# Patient Record
Sex: MALE | Race: BLACK OR AFRICAN AMERICAN | Employment: PART TIME
[De-identification: names, ages, dates, MRNs, and addresses within clinical notes are randomized per-mention and may not be internally consistent; named-entity substitution may affect disease eponyms.]

---

## 2017-01-10 ENCOUNTER — OFFICE VISIT (OUTPATIENT)
Dept: FAMILY MEDICINE CLINIC | Facility: CLINIC | Age: 47
End: 2017-01-10

## 2017-01-10 DIAGNOSIS — I10 ESSENTIAL HYPERTENSION: Primary | ICD-10-CM

## 2017-01-10 DIAGNOSIS — E78.2 MIXED HYPERLIPIDEMIA: ICD-10-CM

## 2017-01-10 DIAGNOSIS — N18.3 CKD (CHRONIC KIDNEY DISEASE), STAGE 3 (MODERATE): ICD-10-CM

## 2017-01-10 DIAGNOSIS — E66.01 MORBID OBESITY WITH BMI OF 50.0-59.9, ADULT (HCC): ICD-10-CM

## 2017-01-10 DIAGNOSIS — Z13.9 SCREENING: ICD-10-CM

## 2017-01-10 PROCEDURE — 99213 OFFICE O/P EST LOW 20 MIN: CPT | Performed by: FAMILY MEDICINE

## 2017-01-10 PROCEDURE — G0009 ADMIN PNEUMOCOCCAL VACCINE: HCPCS | Performed by: FAMILY MEDICINE

## 2017-01-10 PROCEDURE — 90732 PPSV23 VACC 2 YRS+ SUBQ/IM: CPT | Performed by: FAMILY MEDICINE

## 2017-01-10 RX ORDER — HYDRALAZINE HYDROCHLORIDE 10 MG/1
TABLET, FILM COATED ORAL
Qty: 60 TABLET | Refills: 1 | Status: SHIPPED | OUTPATIENT
Start: 2017-01-10 | End: 2017-03-28 | Stop reason: SDUPTHER

## 2017-01-10 RX ORDER — LABETALOL 300 MG/1
TABLET, FILM COATED ORAL
Qty: 60 TABLET | Refills: 3 | Status: SHIPPED | OUTPATIENT
Start: 2017-01-10 | End: 2017-03-31 | Stop reason: SDUPTHER

## 2017-01-10 RX ORDER — LISINOPRIL 2.5 MG/1
2.5 TABLET ORAL DAILY
Qty: 30 TABLET | Refills: 1 | Status: SHIPPED | OUTPATIENT
Start: 2017-01-10 | End: 2017-02-14 | Stop reason: SDUPTHER

## 2017-01-10 RX ORDER — ROSUVASTATIN CALCIUM 10 MG/1
10 TABLET, COATED ORAL DAILY
Qty: 30 TABLET | Refills: 3 | Status: SHIPPED | OUTPATIENT
Start: 2017-01-10 | End: 2017-06-07 | Stop reason: SDUPTHER

## 2017-01-10 RX ORDER — FUROSEMIDE 20 MG/1
TABLET ORAL
Qty: 60 TABLET | Refills: 1 | Status: SHIPPED | OUTPATIENT
Start: 2017-01-10 | End: 2017-03-16 | Stop reason: SDUPTHER

## 2017-01-10 RX ORDER — COLCHICINE 0.6 MG/1
0.6 TABLET ORAL DAILY
COMMUNITY
End: 2017-04-07 | Stop reason: SDUPTHER

## 2017-01-10 RX ORDER — ROSUVASTATIN CALCIUM 10 MG/1
10 TABLET, COATED ORAL DAILY
COMMUNITY
End: 2017-01-10 | Stop reason: SDUPTHER

## 2017-01-10 RX ORDER — CLONIDINE HYDROCHLORIDE 0.2 MG/1
TABLET ORAL
Qty: 60 TABLET | Refills: 1 | Status: SHIPPED | OUTPATIENT
Start: 2017-01-10 | End: 2017-03-16 | Stop reason: ALTCHOICE

## 2017-01-10 ASSESSMENT — ENCOUNTER SYMPTOMS
EYES NEGATIVE: 1
RESPIRATORY NEGATIVE: 1
CONSTIPATION: 0
DIARRHEA: 0
ABDOMINAL PAIN: 0
VOMITING: 0
COLOR CHANGE: 0
NAUSEA: 0
GASTROINTESTINAL NEGATIVE: 1

## 2017-01-31 RX ORDER — FUROSEMIDE 20 MG/1
TABLET ORAL
Qty: 60 TABLET | Refills: 1 | Status: SHIPPED | OUTPATIENT
Start: 2017-01-31 | End: 2017-03-16 | Stop reason: ALTCHOICE

## 2017-02-14 ENCOUNTER — OFFICE VISIT (OUTPATIENT)
Dept: FAMILY MEDICINE CLINIC | Facility: CLINIC | Age: 47
End: 2017-02-14

## 2017-02-14 VITALS
TEMPERATURE: 98.4 F | BODY MASS INDEX: 45.1 KG/M2 | SYSTOLIC BLOOD PRESSURE: 138 MMHG | HEART RATE: 72 BPM | DIASTOLIC BLOOD PRESSURE: 88 MMHG | HEIGHT: 70 IN | WEIGHT: 315 LBS

## 2017-02-14 DIAGNOSIS — E66.01 MORBID OBESITY WITH BMI OF 50.0-59.9, ADULT (HCC): ICD-10-CM

## 2017-02-14 DIAGNOSIS — I10 ESSENTIAL HYPERTENSION: Primary | ICD-10-CM

## 2017-02-14 DIAGNOSIS — Z99.89 OSA ON CPAP: ICD-10-CM

## 2017-02-14 DIAGNOSIS — N18.3 CKD (CHRONIC KIDNEY DISEASE), STAGE 3 (MODERATE): ICD-10-CM

## 2017-02-14 DIAGNOSIS — M1A.9XX0 CHRONIC GOUT, UNSPECIFIED CAUSE, UNSPECIFIED SITE: ICD-10-CM

## 2017-02-14 DIAGNOSIS — G47.33 OSA ON CPAP: ICD-10-CM

## 2017-02-14 PROCEDURE — 99213 OFFICE O/P EST LOW 20 MIN: CPT | Performed by: FAMILY MEDICINE

## 2017-02-14 RX ORDER — LISINOPRIL 5 MG/1
5 TABLET ORAL DAILY
Qty: 30 TABLET | Refills: 1 | Status: SHIPPED | OUTPATIENT
Start: 2017-02-14 | End: 2017-03-16 | Stop reason: SDUPTHER

## 2017-02-14 ASSESSMENT — ENCOUNTER SYMPTOMS
NAUSEA: 0
DIARRHEA: 0
GASTROINTESTINAL NEGATIVE: 1
RESPIRATORY NEGATIVE: 1
EYES NEGATIVE: 1
VOMITING: 0
ABDOMINAL PAIN: 0
COLOR CHANGE: 0
CONSTIPATION: 0

## 2017-02-22 RX ORDER — ALLOPURINOL 100 MG/1
TABLET ORAL
Qty: 30 TABLET | Refills: 5 | Status: SHIPPED | OUTPATIENT
Start: 2017-02-22 | End: 2017-04-18 | Stop reason: SDUPTHER

## 2017-02-28 DIAGNOSIS — N18.3 CKD (CHRONIC KIDNEY DISEASE), STAGE 3 (MODERATE): ICD-10-CM

## 2017-02-28 DIAGNOSIS — E78.5 HYPERLIPIDEMIA, UNSPECIFIED HYPERLIPIDEMIA TYPE: ICD-10-CM

## 2017-02-28 DIAGNOSIS — I10 ESSENTIAL HYPERTENSION: ICD-10-CM

## 2017-02-28 RX ORDER — CLONIDINE HYDROCHLORIDE 0.2 MG/1
TABLET ORAL
Qty: 60 TABLET | Refills: 1 | OUTPATIENT
Start: 2017-02-28

## 2017-02-28 RX ORDER — SIMVASTATIN 20 MG
TABLET ORAL
Qty: 30 TABLET | Refills: 3 | Status: SHIPPED | OUTPATIENT
Start: 2017-02-28 | End: 2017-06-10

## 2017-02-28 RX ORDER — LISINOPRIL 5 MG/1
5 TABLET ORAL DAILY
Qty: 30 TABLET | Refills: 3 | Status: SHIPPED | OUTPATIENT
Start: 2017-02-28 | End: 2017-03-16 | Stop reason: ALTCHOICE

## 2017-03-01 RX ORDER — COLCHICINE 0.6 MG/1
TABLET ORAL
Qty: 30 TABLET | Refills: 3 | OUTPATIENT
Start: 2017-03-01

## 2017-03-16 ENCOUNTER — OFFICE VISIT (OUTPATIENT)
Dept: FAMILY MEDICINE CLINIC | Age: 47
End: 2017-03-16
Payer: MEDICARE

## 2017-03-16 ENCOUNTER — HOSPITAL ENCOUNTER (OUTPATIENT)
Age: 47
Discharge: HOME | End: 2017-03-16
Payer: MEDICARE

## 2017-03-16 VITALS
DIASTOLIC BLOOD PRESSURE: 89 MMHG | HEIGHT: 70 IN | SYSTOLIC BLOOD PRESSURE: 156 MMHG | WEIGHT: 315 LBS | TEMPERATURE: 98.8 F | HEART RATE: 85 BPM | BODY MASS INDEX: 45.1 KG/M2

## 2017-03-16 DIAGNOSIS — I10 ESSENTIAL HYPERTENSION: Primary | ICD-10-CM

## 2017-03-16 DIAGNOSIS — N18.3 CKD (CHRONIC KIDNEY DISEASE), STAGE 3 (MODERATE): ICD-10-CM

## 2017-03-16 DIAGNOSIS — E66.01 MORBID OBESITY WITH BMI OF 50.0-59.9, ADULT (HCC): ICD-10-CM

## 2017-03-16 PROCEDURE — G8427 DOCREV CUR MEDS BY ELIG CLIN: HCPCS | Performed by: FAMILY MEDICINE

## 2017-03-16 PROCEDURE — 99213 OFFICE O/P EST LOW 20 MIN: CPT | Performed by: FAMILY MEDICINE

## 2017-03-16 PROCEDURE — G8417 CALC BMI ABV UP PARAM F/U: HCPCS | Performed by: FAMILY MEDICINE

## 2017-03-16 PROCEDURE — 1036F TOBACCO NON-USER: CPT | Performed by: FAMILY MEDICINE

## 2017-03-16 PROCEDURE — G8484 FLU IMMUNIZE NO ADMIN: HCPCS | Performed by: FAMILY MEDICINE

## 2017-03-16 RX ORDER — LISINOPRIL 10 MG/1
10 TABLET ORAL DAILY
Qty: 30 TABLET | Refills: 0 | Status: SHIPPED | OUTPATIENT
Start: 2017-03-16 | End: 2017-03-31 | Stop reason: SDUPTHER

## 2017-03-16 RX ORDER — HYDROCHLOROTHIAZIDE 25 MG/1
25 TABLET ORAL DAILY
Qty: 30 TABLET | Refills: 1 | Status: SHIPPED | OUTPATIENT
Start: 2017-03-16 | End: 2017-04-18 | Stop reason: SINTOL

## 2017-03-16 ASSESSMENT — ENCOUNTER SYMPTOMS
NAUSEA: 0
DIARRHEA: 0
RESPIRATORY NEGATIVE: 1
CONSTIPATION: 0
EYES NEGATIVE: 1
VOMITING: 0
ABDOMINAL PAIN: 0
COLOR CHANGE: 0
GASTROINTESTINAL NEGATIVE: 1

## 2017-03-28 DIAGNOSIS — I10 ESSENTIAL HYPERTENSION: ICD-10-CM

## 2017-03-29 RX ORDER — HYDRALAZINE HYDROCHLORIDE 10 MG/1
TABLET, FILM COATED ORAL
Qty: 60 TABLET | Refills: 1 | Status: SHIPPED | OUTPATIENT
Start: 2017-03-29 | End: 2017-03-31 | Stop reason: SDUPTHER

## 2017-03-31 DIAGNOSIS — N18.3 CKD (CHRONIC KIDNEY DISEASE), STAGE 3 (MODERATE): ICD-10-CM

## 2017-03-31 DIAGNOSIS — I10 ESSENTIAL HYPERTENSION: ICD-10-CM

## 2017-03-31 RX ORDER — LISINOPRIL 10 MG/1
TABLET ORAL
Qty: 30 TABLET | Refills: 0 | Status: SHIPPED | OUTPATIENT
Start: 2017-03-31 | End: 2017-05-19 | Stop reason: SDUPTHER

## 2017-03-31 RX ORDER — LABETALOL 300 MG/1
TABLET, FILM COATED ORAL
Qty: 60 TABLET | Refills: 3 | Status: SHIPPED | OUTPATIENT
Start: 2017-03-31 | End: 2017-05-19 | Stop reason: SDUPTHER

## 2017-03-31 RX ORDER — HYDRALAZINE HYDROCHLORIDE 10 MG/1
TABLET, FILM COATED ORAL
Qty: 60 TABLET | Refills: 1 | Status: SHIPPED | OUTPATIENT
Start: 2017-03-31 | End: 2017-04-18 | Stop reason: SDUPTHER

## 2017-04-07 RX ORDER — COLCHICINE 0.6 MG/1
TABLET ORAL
Qty: 30 TABLET | Refills: 3 | Status: SHIPPED | OUTPATIENT
Start: 2017-04-07 | End: 2017-12-02 | Stop reason: SDUPTHER

## 2017-04-11 ENCOUNTER — HOSPITAL ENCOUNTER (OUTPATIENT)
Age: 47
Setting detail: SPECIMEN
Discharge: HOME | End: 2017-04-11
Payer: MEDICARE

## 2017-04-11 DIAGNOSIS — I10 ESSENTIAL HYPERTENSION: ICD-10-CM

## 2017-04-11 DIAGNOSIS — M1A.9XX0 CHRONIC GOUT, UNSPECIFIED CAUSE, UNSPECIFIED SITE: ICD-10-CM

## 2017-04-11 DIAGNOSIS — Z13.9 SCREENING: ICD-10-CM

## 2017-04-11 LAB
ANION GAP SERPL CALCULATED.3IONS-SCNC: 13 MMOL/L (ref 9–17)
BUN BLDV-MCNC: 26 MG/DL (ref 6–20)
BUN/CREAT BLD: ABNORMAL (ref 9–20)
CALCIUM SERPL-MCNC: 9.3 MG/DL (ref 8.6–10.4)
CHLORIDE BLD-SCNC: 98 MMOL/L (ref 98–107)
CO2: 27 MMOL/L (ref 20–31)
CREAT SERPL-MCNC: 1.52 MG/DL (ref 0.7–1.2)
GFR AFRICAN AMERICAN: 60 ML/MIN
GFR NON-AFRICAN AMERICAN: 49 ML/MIN
GFR SERPL CREATININE-BSD FRML MDRD: ABNORMAL ML/MIN/{1.73_M2}
GFR SERPL CREATININE-BSD FRML MDRD: ABNORMAL ML/MIN/{1.73_M2}
GLUCOSE BLD-MCNC: 225 MG/DL (ref 70–99)
HIV AG/AB: NONREACTIVE
POTASSIUM SERPL-SCNC: 4.5 MMOL/L (ref 3.7–5.3)
SODIUM BLD-SCNC: 138 MMOL/L (ref 135–144)
URIC ACID: 7.7 MG/DL (ref 3.4–7)

## 2017-04-18 ENCOUNTER — OFFICE VISIT (OUTPATIENT)
Dept: FAMILY MEDICINE CLINIC | Age: 47
End: 2017-04-18
Payer: MEDICARE

## 2017-04-18 VITALS
HEART RATE: 75 BPM | TEMPERATURE: 98.1 F | DIASTOLIC BLOOD PRESSURE: 85 MMHG | BODY MASS INDEX: 54.83 KG/M2 | SYSTOLIC BLOOD PRESSURE: 132 MMHG | WEIGHT: 315 LBS

## 2017-04-18 DIAGNOSIS — E66.01 MORBID OBESITY WITH BMI OF 50.0-59.9, ADULT (HCC): ICD-10-CM

## 2017-04-18 DIAGNOSIS — M1A.0720 CHRONIC GOUT OF LEFT FOOT, UNSPECIFIED CAUSE: ICD-10-CM

## 2017-04-18 DIAGNOSIS — I10 ESSENTIAL HYPERTENSION: Primary | ICD-10-CM

## 2017-04-18 PROCEDURE — 99213 OFFICE O/P EST LOW 20 MIN: CPT

## 2017-04-18 PROCEDURE — 99213 OFFICE O/P EST LOW 20 MIN: CPT | Performed by: FAMILY MEDICINE

## 2017-04-18 RX ORDER — HYDRALAZINE HYDROCHLORIDE 10 MG/1
TABLET, FILM COATED ORAL
Qty: 90 TABLET | Refills: 1 | Status: SHIPPED | OUTPATIENT
Start: 2017-04-18 | End: 2017-05-19 | Stop reason: ALTCHOICE

## 2017-04-18 RX ORDER — ALLOPURINOL 300 MG/1
TABLET ORAL
Qty: 30 TABLET | Refills: 3 | Status: SHIPPED | OUTPATIENT
Start: 2017-04-18 | End: 2017-08-11 | Stop reason: SDUPTHER

## 2017-04-18 ASSESSMENT — ENCOUNTER SYMPTOMS
COLOR CHANGE: 0
VOMITING: 0
EYES NEGATIVE: 1
NAUSEA: 0
GASTROINTESTINAL NEGATIVE: 1
CONSTIPATION: 0
ABDOMINAL PAIN: 0
RESPIRATORY NEGATIVE: 1
DIARRHEA: 0

## 2017-04-18 ASSESSMENT — PATIENT HEALTH QUESTIONNAIRE - PHQ9
SUM OF ALL RESPONSES TO PHQ QUESTIONS 1-9: 0
SUM OF ALL RESPONSES TO PHQ9 QUESTIONS 1 & 2: 0
2. FEELING DOWN, DEPRESSED OR HOPELESS: 0
1. LITTLE INTEREST OR PLEASURE IN DOING THINGS: 0

## 2017-05-19 ENCOUNTER — OFFICE VISIT (OUTPATIENT)
Dept: FAMILY MEDICINE CLINIC | Age: 47
End: 2017-05-19
Payer: MEDICARE

## 2017-05-19 VITALS
DIASTOLIC BLOOD PRESSURE: 97 MMHG | WEIGHT: 315 LBS | SYSTOLIC BLOOD PRESSURE: 148 MMHG | HEART RATE: 90 BPM | BODY MASS INDEX: 45.1 KG/M2 | TEMPERATURE: 97.8 F | HEIGHT: 70 IN

## 2017-05-19 DIAGNOSIS — G89.29 CHRONIC BILATERAL LOW BACK PAIN WITHOUT SCIATICA: ICD-10-CM

## 2017-05-19 DIAGNOSIS — M54.50 CHRONIC BILATERAL LOW BACK PAIN WITHOUT SCIATICA: ICD-10-CM

## 2017-05-19 DIAGNOSIS — I10 ESSENTIAL HYPERTENSION: Primary | ICD-10-CM

## 2017-05-19 DIAGNOSIS — R06.09 DYSPNEA ON EXERTION: ICD-10-CM

## 2017-05-19 DIAGNOSIS — N18.3 CKD (CHRONIC KIDNEY DISEASE), STAGE 3 (MODERATE): ICD-10-CM

## 2017-05-19 PROCEDURE — 1036F TOBACCO NON-USER: CPT | Performed by: FAMILY MEDICINE

## 2017-05-19 PROCEDURE — 99213 OFFICE O/P EST LOW 20 MIN: CPT

## 2017-05-19 PROCEDURE — G8417 CALC BMI ABV UP PARAM F/U: HCPCS | Performed by: FAMILY MEDICINE

## 2017-05-19 PROCEDURE — 99213 OFFICE O/P EST LOW 20 MIN: CPT | Performed by: FAMILY MEDICINE

## 2017-05-19 PROCEDURE — G8427 DOCREV CUR MEDS BY ELIG CLIN: HCPCS | Performed by: FAMILY MEDICINE

## 2017-05-19 RX ORDER — LISINOPRIL 20 MG/1
TABLET ORAL
Qty: 30 TABLET | Refills: 1 | Status: SHIPPED | OUTPATIENT
Start: 2017-05-19 | End: 2017-06-02 | Stop reason: SDUPTHER

## 2017-05-19 RX ORDER — FUROSEMIDE 20 MG/1
20 TABLET ORAL DAILY
Qty: 30 TABLET | Refills: 1 | Status: SHIPPED | OUTPATIENT
Start: 2017-05-19 | End: 2017-06-02 | Stop reason: SDUPTHER

## 2017-05-19 RX ORDER — ACETAMINOPHEN 500 MG
1000 TABLET ORAL 2 TIMES DAILY PRN
Qty: 120 TABLET | Refills: 3 | Status: SHIPPED | OUTPATIENT
Start: 2017-05-19 | End: 2019-02-01 | Stop reason: SDUPTHER

## 2017-05-19 RX ORDER — LABETALOL 300 MG/1
TABLET, FILM COATED ORAL
Qty: 60 TABLET | Refills: 3 | Status: SHIPPED | OUTPATIENT
Start: 2017-05-19 | End: 2017-09-30 | Stop reason: SDUPTHER

## 2017-05-19 RX ORDER — AMLODIPINE BESYLATE 10 MG/1
TABLET ORAL
Qty: 30 TABLET | Refills: 4 | Status: SHIPPED | OUTPATIENT
Start: 2017-05-19 | End: 2017-10-20 | Stop reason: SDUPTHER

## 2017-05-19 ASSESSMENT — ENCOUNTER SYMPTOMS
NAUSEA: 0
COLOR CHANGE: 0
SHORTNESS OF BREATH: 1
DIARRHEA: 0
GASTROINTESTINAL NEGATIVE: 1
EYES NEGATIVE: 1
ABDOMINAL PAIN: 0
CONSTIPATION: 0
VOMITING: 0

## 2017-05-24 ENCOUNTER — HOSPITAL ENCOUNTER (OUTPATIENT)
Dept: NON INVASIVE DIAGNOSTICS | Age: 47
Discharge: HOME | End: 2017-05-24
Payer: MEDICARE

## 2017-05-24 DIAGNOSIS — R06.09 DYSPNEA ON EXERTION: ICD-10-CM

## 2017-05-24 LAB
LV EF: 55 %
LVEF MODALITY: NORMAL

## 2017-05-24 PROCEDURE — 93306 TTE W/DOPPLER COMPLETE: CPT

## 2017-05-25 ENCOUNTER — HOSPITAL ENCOUNTER (OUTPATIENT)
Age: 47
Discharge: HOME | End: 2017-05-25
Payer: MEDICARE

## 2017-05-25 DIAGNOSIS — I10 ESSENTIAL HYPERTENSION: ICD-10-CM

## 2017-05-25 LAB
ALBUMIN SERPL-MCNC: 4.1 G/DL (ref 3.5–5.2)
ALBUMIN/GLOBULIN RATIO: 1.1 (ref 1–2.5)
ALP BLD-CCNC: 78 U/L (ref 40–129)
ALT SERPL-CCNC: 28 U/L (ref 5–41)
ANION GAP SERPL CALCULATED.3IONS-SCNC: 14 MMOL/L (ref 9–17)
AST SERPL-CCNC: 28 U/L
BILIRUB SERPL-MCNC: 0.54 MG/DL (ref 0.3–1.2)
BUN BLDV-MCNC: 19 MG/DL (ref 6–20)
BUN/CREAT BLD: ABNORMAL (ref 9–20)
CALCIUM SERPL-MCNC: 9.2 MG/DL (ref 8.6–10.4)
CHLORIDE BLD-SCNC: 97 MMOL/L (ref 98–107)
CO2: 23 MMOL/L (ref 20–31)
CREAT SERPL-MCNC: 1.25 MG/DL (ref 0.7–1.2)
GFR AFRICAN AMERICAN: >60 ML/MIN
GFR NON-AFRICAN AMERICAN: >60 ML/MIN
GFR SERPL CREATININE-BSD FRML MDRD: ABNORMAL ML/MIN/{1.73_M2}
GFR SERPL CREATININE-BSD FRML MDRD: ABNORMAL ML/MIN/{1.73_M2}
GLUCOSE BLD-MCNC: 210 MG/DL (ref 70–99)
POTASSIUM SERPL-SCNC: 4.6 MMOL/L (ref 3.7–5.3)
SODIUM BLD-SCNC: 134 MMOL/L (ref 135–144)
TOTAL PROTEIN: 8 G/DL (ref 6.4–8.3)
TSH SERPL DL<=0.05 MIU/L-ACNC: 1.57 MIU/L (ref 0.3–5)

## 2017-05-25 PROCEDURE — 36415 COLL VENOUS BLD VENIPUNCTURE: CPT

## 2017-05-25 PROCEDURE — 82088 ASSAY OF ALDOSTERONE: CPT

## 2017-05-25 PROCEDURE — 84443 ASSAY THYROID STIM HORMONE: CPT

## 2017-05-25 PROCEDURE — 80053 COMPREHEN METABOLIC PANEL: CPT

## 2017-05-26 LAB
ALDOSTERONE COMMENT: NORMAL
ALDOSTERONE: 14.4 NG/DL

## 2017-06-02 ENCOUNTER — OFFICE VISIT (OUTPATIENT)
Dept: FAMILY MEDICINE CLINIC | Age: 47
End: 2017-06-02
Payer: MEDICARE

## 2017-06-02 VITALS
WEIGHT: 315 LBS | TEMPERATURE: 98.5 F | SYSTOLIC BLOOD PRESSURE: 138 MMHG | HEART RATE: 68 BPM | BODY MASS INDEX: 54.43 KG/M2 | DIASTOLIC BLOOD PRESSURE: 88 MMHG

## 2017-06-02 DIAGNOSIS — N18.3 CKD (CHRONIC KIDNEY DISEASE), STAGE 3 (MODERATE): ICD-10-CM

## 2017-06-02 DIAGNOSIS — I10 ESSENTIAL HYPERTENSION: Primary | ICD-10-CM

## 2017-06-02 DIAGNOSIS — Z99.89 OSA ON CPAP: ICD-10-CM

## 2017-06-02 DIAGNOSIS — G47.33 OSA ON CPAP: ICD-10-CM

## 2017-06-02 DIAGNOSIS — E11.9 TYPE 2 DIABETES MELLITUS WITHOUT COMPLICATION, WITHOUT LONG-TERM CURRENT USE OF INSULIN (HCC): ICD-10-CM

## 2017-06-02 LAB — HBA1C MFR BLD: 8.2 %

## 2017-06-02 PROCEDURE — 99213 OFFICE O/P EST LOW 20 MIN: CPT | Performed by: FAMILY MEDICINE

## 2017-06-02 PROCEDURE — G8417 CALC BMI ABV UP PARAM F/U: HCPCS | Performed by: FAMILY MEDICINE

## 2017-06-02 PROCEDURE — 83036 HEMOGLOBIN GLYCOSYLATED A1C: CPT | Performed by: FAMILY MEDICINE

## 2017-06-02 PROCEDURE — 1036F TOBACCO NON-USER: CPT | Performed by: FAMILY MEDICINE

## 2017-06-02 PROCEDURE — G8427 DOCREV CUR MEDS BY ELIG CLIN: HCPCS | Performed by: FAMILY MEDICINE

## 2017-06-02 PROCEDURE — 3044F HG A1C LEVEL LT 7.0%: CPT | Performed by: FAMILY MEDICINE

## 2017-06-02 RX ORDER — LISINOPRIL 20 MG/1
TABLET ORAL
Qty: 30 TABLET | Refills: 1 | Status: SHIPPED | OUTPATIENT
Start: 2017-06-02 | End: 2017-07-31 | Stop reason: SDUPTHER

## 2017-06-02 RX ORDER — FUROSEMIDE 20 MG/1
20 TABLET ORAL DAILY
Qty: 30 TABLET | Refills: 1 | Status: SHIPPED | OUTPATIENT
Start: 2017-06-02 | End: 2017-09-30 | Stop reason: SDUPTHER

## 2017-06-02 ASSESSMENT — ENCOUNTER SYMPTOMS
EYES NEGATIVE: 1
GASTROINTESTINAL NEGATIVE: 1
SHORTNESS OF BREATH: 0
NAUSEA: 0
VOMITING: 0
ABDOMINAL PAIN: 0
DIARRHEA: 0
COLOR CHANGE: 0
CONSTIPATION: 0

## 2017-06-07 DIAGNOSIS — E78.2 MIXED HYPERLIPIDEMIA: ICD-10-CM

## 2017-06-10 RX ORDER — ROSUVASTATIN CALCIUM 10 MG/1
TABLET, COATED ORAL
Qty: 30 TABLET | Refills: 3 | Status: SHIPPED | OUTPATIENT
Start: 2017-06-10 | End: 2017-10-14 | Stop reason: SDUPTHER

## 2017-06-29 ENCOUNTER — OFFICE VISIT (OUTPATIENT)
Dept: FAMILY MEDICINE CLINIC | Age: 47
End: 2017-06-29
Payer: MEDICARE

## 2017-06-29 VITALS
SYSTOLIC BLOOD PRESSURE: 139 MMHG | HEART RATE: 76 BPM | BODY MASS INDEX: 45.1 KG/M2 | TEMPERATURE: 97.3 F | WEIGHT: 315 LBS | DIASTOLIC BLOOD PRESSURE: 85 MMHG | HEIGHT: 70 IN

## 2017-06-29 DIAGNOSIS — E66.01 MORBID OBESITY WITH BMI OF 50.0-59.9, ADULT (HCC): ICD-10-CM

## 2017-06-29 DIAGNOSIS — E11.9 TYPE 2 DIABETES MELLITUS WITHOUT COMPLICATION, WITHOUT LONG-TERM CURRENT USE OF INSULIN (HCC): Primary | ICD-10-CM

## 2017-06-29 PROCEDURE — G8417 CALC BMI ABV UP PARAM F/U: HCPCS | Performed by: FAMILY MEDICINE

## 2017-06-29 PROCEDURE — G8427 DOCREV CUR MEDS BY ELIG CLIN: HCPCS | Performed by: FAMILY MEDICINE

## 2017-06-29 PROCEDURE — 99213 OFFICE O/P EST LOW 20 MIN: CPT | Performed by: FAMILY MEDICINE

## 2017-06-29 PROCEDURE — 3046F HEMOGLOBIN A1C LEVEL >9.0%: CPT | Performed by: FAMILY MEDICINE

## 2017-06-29 PROCEDURE — 1036F TOBACCO NON-USER: CPT | Performed by: FAMILY MEDICINE

## 2017-06-29 RX ORDER — GLIMEPIRIDE 4 MG/1
4 TABLET ORAL
Qty: 30 TABLET | Refills: 1 | Status: SHIPPED | OUTPATIENT
Start: 2017-06-29 | End: 2017-08-27 | Stop reason: SDUPTHER

## 2017-06-29 ASSESSMENT — ENCOUNTER SYMPTOMS
COLOR CHANGE: 0
DIARRHEA: 0
SHORTNESS OF BREATH: 0
CONSTIPATION: 0
EYES NEGATIVE: 1
NAUSEA: 0
ABDOMINAL PAIN: 0
GASTROINTESTINAL NEGATIVE: 1
VOMITING: 0

## 2017-07-27 ENCOUNTER — OFFICE VISIT (OUTPATIENT)
Dept: FAMILY MEDICINE CLINIC | Age: 47
End: 2017-07-27
Payer: MEDICARE

## 2017-07-27 VITALS
WEIGHT: 315 LBS | HEIGHT: 70 IN | TEMPERATURE: 97.4 F | HEART RATE: 74 BPM | BODY MASS INDEX: 45.1 KG/M2 | DIASTOLIC BLOOD PRESSURE: 92 MMHG | SYSTOLIC BLOOD PRESSURE: 140 MMHG

## 2017-07-27 DIAGNOSIS — E66.01 MORBID OBESITY WITH BMI OF 50.0-59.9, ADULT (HCC): ICD-10-CM

## 2017-07-27 DIAGNOSIS — I10 ESSENTIAL HYPERTENSION: ICD-10-CM

## 2017-07-27 DIAGNOSIS — N18.3 CKD (CHRONIC KIDNEY DISEASE), STAGE 3 (MODERATE): ICD-10-CM

## 2017-07-27 DIAGNOSIS — E11.9 TYPE 2 DIABETES MELLITUS WITHOUT COMPLICATION, WITHOUT LONG-TERM CURRENT USE OF INSULIN (HCC): Primary | ICD-10-CM

## 2017-07-27 PROCEDURE — G8417 CALC BMI ABV UP PARAM F/U: HCPCS | Performed by: FAMILY MEDICINE

## 2017-07-27 PROCEDURE — G8427 DOCREV CUR MEDS BY ELIG CLIN: HCPCS | Performed by: FAMILY MEDICINE

## 2017-07-27 PROCEDURE — 3046F HEMOGLOBIN A1C LEVEL >9.0%: CPT | Performed by: FAMILY MEDICINE

## 2017-07-27 PROCEDURE — 99213 OFFICE O/P EST LOW 20 MIN: CPT | Performed by: FAMILY MEDICINE

## 2017-07-27 PROCEDURE — 1036F TOBACCO NON-USER: CPT | Performed by: FAMILY MEDICINE

## 2017-07-27 RX ORDER — HYDRALAZINE HYDROCHLORIDE 10 MG/1
1 TABLET, FILM COATED ORAL 3 TIMES DAILY
Refills: 0 | COMMUNITY
Start: 2017-06-29 | End: 2018-05-29 | Stop reason: SDUPTHER

## 2017-07-27 ASSESSMENT — ENCOUNTER SYMPTOMS
VOMITING: 0
NAUSEA: 0
DIARRHEA: 0
SHORTNESS OF BREATH: 0

## 2017-07-31 DIAGNOSIS — N18.3 CKD (CHRONIC KIDNEY DISEASE), STAGE 3 (MODERATE): ICD-10-CM

## 2017-07-31 DIAGNOSIS — I10 ESSENTIAL HYPERTENSION: ICD-10-CM

## 2017-08-01 RX ORDER — LISINOPRIL 20 MG/1
TABLET ORAL
Qty: 30 TABLET | Refills: 1 | Status: SHIPPED | OUTPATIENT
Start: 2017-08-01 | End: 2017-08-07 | Stop reason: SDUPTHER

## 2017-08-01 RX ORDER — FUROSEMIDE 20 MG/1
TABLET ORAL
Qty: 30 TABLET | Refills: 1 | Status: SHIPPED | OUTPATIENT
Start: 2017-08-01 | End: 2018-01-23 | Stop reason: SDUPTHER

## 2017-08-01 RX ORDER — HYDRALAZINE HYDROCHLORIDE 10 MG/1
TABLET, FILM COATED ORAL
Qty: 90 TABLET | Refills: 1 | Status: SHIPPED | OUTPATIENT
Start: 2017-08-01 | End: 2017-09-26 | Stop reason: SDUPTHER

## 2017-08-07 DIAGNOSIS — N18.3 CKD (CHRONIC KIDNEY DISEASE), STAGE 3 (MODERATE): ICD-10-CM

## 2017-08-07 DIAGNOSIS — I10 ESSENTIAL HYPERTENSION: ICD-10-CM

## 2017-08-07 RX ORDER — LISINOPRIL 20 MG/1
20 TABLET ORAL DAILY
Qty: 90 TABLET | Refills: 0 | Status: SHIPPED | OUTPATIENT
Start: 2017-08-07 | End: 2018-01-05 | Stop reason: SDUPTHER

## 2017-08-11 DIAGNOSIS — M1A.0720 CHRONIC GOUT OF LEFT FOOT, UNSPECIFIED CAUSE: ICD-10-CM

## 2017-08-14 RX ORDER — ALLOPURINOL 300 MG/1
TABLET ORAL
Qty: 30 TABLET | Refills: 3 | Status: SHIPPED | OUTPATIENT
Start: 2017-08-14 | End: 2017-12-15 | Stop reason: SDUPTHER

## 2017-08-27 DIAGNOSIS — E11.9 TYPE 2 DIABETES MELLITUS WITHOUT COMPLICATION, WITHOUT LONG-TERM CURRENT USE OF INSULIN (HCC): ICD-10-CM

## 2017-08-29 RX ORDER — GLIMEPIRIDE 4 MG/1
TABLET ORAL
Qty: 30 TABLET | Refills: 1 | Status: SHIPPED | OUTPATIENT
Start: 2017-08-29 | End: 2017-10-25 | Stop reason: SDUPTHER

## 2017-09-01 ENCOUNTER — TELEPHONE (OUTPATIENT)
Dept: ADMINISTRATIVE | Age: 47
End: 2017-09-01

## 2017-09-26 DIAGNOSIS — I10 ESSENTIAL HYPERTENSION: ICD-10-CM

## 2017-09-27 RX ORDER — HYDRALAZINE HYDROCHLORIDE 10 MG/1
TABLET, FILM COATED ORAL
Qty: 90 TABLET | Refills: 1 | Status: SHIPPED | OUTPATIENT
Start: 2017-09-27 | End: 2017-11-21 | Stop reason: SDUPTHER

## 2017-09-30 DIAGNOSIS — I10 ESSENTIAL HYPERTENSION: ICD-10-CM

## 2017-10-04 RX ORDER — FUROSEMIDE 20 MG/1
TABLET ORAL
Qty: 30 TABLET | Refills: 1 | Status: SHIPPED | OUTPATIENT
Start: 2017-10-04 | End: 2017-12-22 | Stop reason: SDUPTHER

## 2017-10-04 RX ORDER — LABETALOL 300 MG/1
TABLET, FILM COATED ORAL
Qty: 60 TABLET | Refills: 3 | Status: SHIPPED | OUTPATIENT
Start: 2017-10-04 | End: 2018-01-23 | Stop reason: SDUPTHER

## 2017-10-14 DIAGNOSIS — E78.2 MIXED HYPERLIPIDEMIA: ICD-10-CM

## 2017-10-16 RX ORDER — ROSUVASTATIN CALCIUM 10 MG/1
TABLET, COATED ORAL
Qty: 30 TABLET | Refills: 3 | Status: SHIPPED | OUTPATIENT
Start: 2017-10-16 | End: 2018-01-23 | Stop reason: SDUPTHER

## 2017-10-20 DIAGNOSIS — I10 ESSENTIAL HYPERTENSION: ICD-10-CM

## 2017-10-20 RX ORDER — AMLODIPINE BESYLATE 10 MG/1
TABLET ORAL
Qty: 30 TABLET | Refills: 4 | Status: SHIPPED | OUTPATIENT
Start: 2017-10-20 | End: 2018-03-18 | Stop reason: SDUPTHER

## 2017-10-25 DIAGNOSIS — E11.9 TYPE 2 DIABETES MELLITUS WITHOUT COMPLICATION, WITHOUT LONG-TERM CURRENT USE OF INSULIN (HCC): ICD-10-CM

## 2017-10-26 RX ORDER — GLIMEPIRIDE 4 MG/1
TABLET ORAL
Qty: 30 TABLET | Refills: 1 | Status: SHIPPED | OUTPATIENT
Start: 2017-10-26 | End: 2017-12-22 | Stop reason: SDUPTHER

## 2017-11-21 DIAGNOSIS — I10 ESSENTIAL HYPERTENSION: ICD-10-CM

## 2017-11-22 RX ORDER — HYDRALAZINE HYDROCHLORIDE 10 MG/1
TABLET, FILM COATED ORAL
Qty: 90 TABLET | Refills: 1 | Status: SHIPPED | OUTPATIENT
Start: 2017-11-22 | End: 2018-01-23 | Stop reason: SDUPTHER

## 2017-12-04 RX ORDER — COLCHICINE 0.6 MG/1
TABLET, FILM COATED ORAL
Qty: 30 TABLET | Refills: 3 | Status: SHIPPED | OUTPATIENT
Start: 2017-12-04 | End: 2018-01-23 | Stop reason: SDUPTHER

## 2017-12-15 DIAGNOSIS — M1A.0720 CHRONIC GOUT OF LEFT FOOT, UNSPECIFIED CAUSE: ICD-10-CM

## 2017-12-15 RX ORDER — ALLOPURINOL 300 MG/1
TABLET ORAL
Qty: 30 TABLET | Refills: 3 | Status: SHIPPED | OUTPATIENT
Start: 2017-12-15 | End: 2018-04-18 | Stop reason: SDUPTHER

## 2017-12-22 DIAGNOSIS — E11.9 TYPE 2 DIABETES MELLITUS WITHOUT COMPLICATION, WITHOUT LONG-TERM CURRENT USE OF INSULIN (HCC): ICD-10-CM

## 2017-12-22 DIAGNOSIS — I10 ESSENTIAL HYPERTENSION: ICD-10-CM

## 2017-12-22 RX ORDER — FUROSEMIDE 20 MG/1
TABLET ORAL
Qty: 30 TABLET | Refills: 1 | Status: SHIPPED | OUTPATIENT
Start: 2017-12-22 | End: 2018-12-27 | Stop reason: SDUPTHER

## 2017-12-22 RX ORDER — GLIMEPIRIDE 4 MG/1
TABLET ORAL
Qty: 30 TABLET | Refills: 1 | Status: SHIPPED | OUTPATIENT
Start: 2017-12-22 | End: 2018-01-23 | Stop reason: SDUPTHER

## 2017-12-22 NOTE — TELEPHONE ENCOUNTER
Please address the medication refill and close the encounter. If I can be of assistance, please route to the applicable pool. Thank you. Next Visit Date:  No future appointments. Health Maintenance   Topic Date Due    Diabetic microalbuminuria test  11/22/2017    Lipid screen  11/22/2017    DTaP/Tdap/Td vaccine (1 - Tdap) 01/10/2018 (Originally 11/19/1988)    Flu vaccine (1) 01/10/2018 (Originally 9/1/2017)    Diabetic foot exam  02/01/2018 (Originally 11/19/1979)    Diabetic retinal exam  02/01/2018 (Originally 11/19/1979)    Diabetic hemoglobin A1C test  06/02/2018    Pneumococcal med risk  Completed    HIV screen  Completed       Hemoglobin A1C (%)   Date Value   06/02/2017 8.2   11/22/2016 5.2   05/19/2015 5.3             ( goal A1C is < 7)   Microalb/Crt.  Ratio (mcg/mg creat)   Date Value   11/22/2016 88 (H)     LDL Cholesterol (mg/dL)   Date Value   11/22/2016 60       (goal LDL is <100)   AST (U/L)   Date Value   05/25/2017 28     ALT (U/L)   Date Value   05/25/2017 28     BUN (mg/dL)   Date Value   06/06/2017 18     BP Readings from Last 3 Encounters:   07/27/17 (!) 140/92   06/29/17 139/85   06/02/17 138/88          (goal 120/80)    All Future Testing planned in [facility]  Lab Frequency Next Occurrence   Lipid Panel Once 01/31/2018               Patient Active Problem List:     HTN (hypertension)     Chronic back pain     Morbid obesity with BMI of 50.0-59.9, adult (HCC)     ZURDO on CPAP     Gout     CKD (chronic kidney disease)     HLD (hyperlipidemia)     Squamous cell carcinoma of face     Type 2 diabetes mellitus without complication, without long-term current use of insulin ([facility] [address]

## 2018-01-05 DIAGNOSIS — N18.30 STAGE 3 CHRONIC KIDNEY DISEASE (HCC): ICD-10-CM

## 2018-01-05 DIAGNOSIS — I10 ESSENTIAL HYPERTENSION: ICD-10-CM

## 2018-01-05 NOTE — TELEPHONE ENCOUNTER
Please address the medication refill and close the encounter. If I can be of assistance, please route to the applicable pool. Thank you. Next Visit Date:  Future Appointments  Date Time Provider Lizette Marques   1/23/2018 8:30 AM Pilar José MD [address] [place] [address] Maintenance   Topic Date Due    Diabetic microalbuminuria test  11/22/2017    Lipid screen  11/22/2017    DTaP/Tdap/Td vaccine (1 - Tdap) 01/10/2018 (Originally 11/19/1988)    Flu vaccine (1) 01/10/2018 (Originally 9/1/2017)    Diabetic foot exam  02/01/2018 (Originally 11/19/1979)    Diabetic retinal exam  02/01/2018 (Originally 11/19/1979)    A1C test (Diabetic or Prediabetic)  06/02/2018    Potassium monitoring  06/06/2018    Creatinine monitoring  06/06/2018    Pneumococcal med risk  Completed    HIV screen  Completed       Hemoglobin A1C (%)   Date Value   06/02/2017 8.2   11/22/2016 5.2   05/19/2015 5.3             ( goal A1C is < 7)   Microalb/Crt.  Ratio (mcg/mg creat)   Date Value   11/22/2016 88 (H)     LDL Cholesterol (mg/dL)   Date Value   11/22/2016 60       (goal LDL is <100)   AST (U/L)   Date Value   05/25/2017 28     ALT (U/L)   Date Value   05/25/2017 28     BUN (mg/dL)   Date Value   06/06/2017 18     BP Readings from Last 3 Encounters:   07/27/17 (!) 140/92   06/29/17 139/85   06/02/17 138/88          (goal 120/80)    All Future Testing planned in [facility]  Lab Frequency Next Occurrence   Lipid Panel Once 01/31/2018               Patient Active Problem List:     HTN (hypertension)     Chronic back pain     Morbid obesity with BMI of 50.0-59.9, adult (HCC)     ZURDO on CPAP     Gout     CKD (chronic kidney disease)     HLD (hyperlipidemia)     Squamous cell carcinoma of face     Type 2 diabetes mellitus without complication, without long-term current use of insulin ([facility] [address]

## 2018-01-09 RX ORDER — LISINOPRIL 20 MG/1
TABLET ORAL
Qty: 90 TABLET | Refills: 0 | Status: SHIPPED | OUTPATIENT
Start: 2018-01-09 | End: 2018-01-23 | Stop reason: SDUPTHER

## 2018-01-23 ENCOUNTER — HOSPITAL ENCOUNTER (OUTPATIENT)
Age: 48
Setting detail: SPECIMEN
Discharge: HOME | End: 2018-01-23
Payer: MEDICARE

## 2018-01-23 ENCOUNTER — OFFICE VISIT (OUTPATIENT)
Dept: FAMILY MEDICINE CLINIC | Age: 48
End: 2018-01-23
Payer: MEDICARE

## 2018-01-23 VITALS
WEIGHT: 315 LBS | SYSTOLIC BLOOD PRESSURE: 136 MMHG | TEMPERATURE: 97.5 F | BODY MASS INDEX: 45.1 KG/M2 | HEIGHT: 70 IN | HEART RATE: 77 BPM | DIASTOLIC BLOOD PRESSURE: 80 MMHG

## 2018-01-23 DIAGNOSIS — M1A.0720 CHRONIC GOUT OF LEFT FOOT, UNSPECIFIED CAUSE: ICD-10-CM

## 2018-01-23 DIAGNOSIS — I10 ESSENTIAL HYPERTENSION: ICD-10-CM

## 2018-01-23 DIAGNOSIS — E78.2 MIXED HYPERLIPIDEMIA: ICD-10-CM

## 2018-01-23 DIAGNOSIS — E11.9 TYPE 2 DIABETES MELLITUS WITHOUT COMPLICATION, WITHOUT LONG-TERM CURRENT USE OF INSULIN (HCC): Primary | ICD-10-CM

## 2018-01-23 DIAGNOSIS — N18.30 STAGE 3 CHRONIC KIDNEY DISEASE (HCC): ICD-10-CM

## 2018-01-23 LAB
ABSOLUTE EOS #: 0.17 K/UL (ref 0–0.44)
ABSOLUTE IMMATURE GRANULOCYTE: 0.03 K/UL (ref 0–0.3)
ABSOLUTE LYMPH #: 2.42 K/UL (ref 1.1–3.7)
ABSOLUTE MONO #: 0.58 K/UL (ref 0.1–1.2)
BASOPHILS # BLD: 0 % (ref 0–2)
BASOPHILS ABSOLUTE: 0.04 K/UL (ref 0–0.2)
CREATININE URINE: 121.8 MG/DL (ref 39–259)
DIFFERENTIAL TYPE: ABNORMAL
EOSINOPHILS RELATIVE PERCENT: 2 % (ref 1–4)
HBA1C MFR BLD: 5.1 %
HCT VFR BLD CALC: 38.5 % (ref 40.7–50.3)
HEMOGLOBIN: 11.7 G/DL (ref 13–17)
IMMATURE GRANULOCYTES: 0 %
LYMPHOCYTES # BLD: 26 % (ref 24–43)
MCH RBC QN AUTO: 26.4 PG (ref 25.2–33.5)
MCHC RBC AUTO-ENTMCNC: 30.4 G/DL (ref 28.4–34.8)
MCV RBC AUTO: 86.7 FL (ref 82.6–102.9)
MICROALBUMIN/CREAT 24H UR: 251 MG/L
MICROALBUMIN/CREAT UR-RTO: 206 MCG/MG CREAT
MONOCYTES # BLD: 6 % (ref 3–12)
NRBC AUTOMATED: 0 PER 100 WBC
PDW BLD-RTO: 14.5 % (ref 11.8–14.4)
PLATELET # BLD: 220 K/UL (ref 138–453)
PLATELET ESTIMATE: ABNORMAL
PMV BLD AUTO: 12.5 FL (ref 8.1–13.5)
RBC # BLD: 4.44 M/UL (ref 4.21–5.77)
RBC # BLD: ABNORMAL 10*6/UL
SEG NEUTROPHILS: 66 % (ref 36–65)
SEGMENTED NEUTROPHILS ABSOLUTE COUNT: 5.97 K/UL (ref 1.5–8.1)
WBC # BLD: 9.2 K/UL (ref 3.5–11.3)
WBC # BLD: ABNORMAL 10*3/UL

## 2018-01-23 PROCEDURE — 3044F HG A1C LEVEL LT 7.0%: CPT | Performed by: FAMILY MEDICINE

## 2018-01-23 PROCEDURE — 1036F TOBACCO NON-USER: CPT | Performed by: FAMILY MEDICINE

## 2018-01-23 PROCEDURE — 82043 UR ALBUMIN QUANTITATIVE: CPT | Performed by: FAMILY MEDICINE

## 2018-01-23 PROCEDURE — G8484 FLU IMMUNIZE NO ADMIN: HCPCS | Performed by: FAMILY MEDICINE

## 2018-01-23 PROCEDURE — 83036 HEMOGLOBIN GLYCOSYLATED A1C: CPT | Performed by: FAMILY MEDICINE

## 2018-01-23 PROCEDURE — 99213 OFFICE O/P EST LOW 20 MIN: CPT

## 2018-01-23 PROCEDURE — G8427 DOCREV CUR MEDS BY ELIG CLIN: HCPCS | Performed by: FAMILY MEDICINE

## 2018-01-23 PROCEDURE — 99213 OFFICE O/P EST LOW 20 MIN: CPT | Performed by: FAMILY MEDICINE

## 2018-01-23 PROCEDURE — G8417 CALC BMI ABV UP PARAM F/U: HCPCS | Performed by: FAMILY MEDICINE

## 2018-01-23 RX ORDER — BLOOD PRESSURE TEST KIT
1 KIT MISCELLANEOUS DAILY
Qty: 1 KIT | Refills: 0 | Status: SHIPPED | OUTPATIENT
Start: 2018-01-23 | End: 2019-06-11 | Stop reason: SDUPTHER

## 2018-01-23 RX ORDER — HYDRALAZINE HYDROCHLORIDE 10 MG/1
TABLET, FILM COATED ORAL
Qty: 90 TABLET | Refills: 1 | Status: SHIPPED | OUTPATIENT
Start: 2018-01-23 | End: 2018-03-30 | Stop reason: SDUPTHER

## 2018-01-23 RX ORDER — GLIMEPIRIDE 4 MG/1
TABLET ORAL
Qty: 30 TABLET | Refills: 1 | Status: SHIPPED | OUTPATIENT
Start: 2018-01-23 | End: 2018-04-27 | Stop reason: SDUPTHER

## 2018-01-23 RX ORDER — COLCHICINE 0.6 MG/1
TABLET ORAL
Qty: 30 TABLET | Refills: 3 | Status: SHIPPED | OUTPATIENT
Start: 2018-01-23 | End: 2018-12-26 | Stop reason: SDUPTHER

## 2018-01-23 RX ORDER — FUROSEMIDE 20 MG/1
TABLET ORAL
Qty: 30 TABLET | Refills: 1 | Status: SHIPPED | OUTPATIENT
Start: 2018-01-23 | End: 2018-04-28 | Stop reason: SDUPTHER

## 2018-01-23 RX ORDER — LABETALOL 300 MG/1
TABLET, FILM COATED ORAL
Qty: 60 TABLET | Refills: 3 | Status: SHIPPED | OUTPATIENT
Start: 2018-01-23 | End: 2018-06-17 | Stop reason: SDUPTHER

## 2018-01-23 RX ORDER — LISINOPRIL 20 MG/1
TABLET ORAL
Qty: 90 TABLET | Refills: 3 | Status: SHIPPED | OUTPATIENT
Start: 2018-01-23 | End: 2018-04-27 | Stop reason: SDUPTHER

## 2018-01-23 RX ORDER — ROSUVASTATIN CALCIUM 10 MG/1
TABLET, COATED ORAL
Qty: 30 TABLET | Refills: 3 | Status: SHIPPED | OUTPATIENT
Start: 2018-01-23 | End: 2018-06-06 | Stop reason: SDUPTHER

## 2018-01-23 ASSESSMENT — ENCOUNTER SYMPTOMS
VOMITING: 0
DIARRHEA: 0
SHORTNESS OF BREATH: 0
NAUSEA: 0

## 2018-01-23 NOTE — PROGRESS NOTES
tablet    Blood Pressure KIT   3. Stage 3 chronic kidney disease  lisinopril (PRINIVIL;ZESTRIL) 20 MG tablet    Basic Metabolic Panel    CBC Auto Differential   4. Mixed hyperlipidemia  rosuvastatin (CRESTOR) 10 MG tablet    Lipid Panel   5. Chronic gout of left foot, unspecified cause  colchicine (COLCRYS) 0.6 MG tablet     I agree with  orders as documented by the resident. Recommendations:   Patient's exam is normal, glucose log is controlled and repeat BP is normal. Medications regimen reviewed and refills provided. Diabetic foot and eye exam updated. Labs and health maintenance updated. He will continue with life style changes and follow up in 3 months to decrease Amaryl if unchanged. Return in about 3 months (around 4/23/2018) for fu a1c, labs.    (Oz Reyes ) Dr. Adrianna Lopez MD
MG tablet 30 tablet 1     Sig: take 1 tablet by mouth once daily    lisinopril (PRINIVIL;ZESTRIL) 20 MG tablet 90 tablet 3     Sig: take 1 tablet by mouth once daily    rosuvastatin (CRESTOR) 10 MG tablet 30 tablet 3     Sig: take 1 tablet by mouth once daily    glimepiride (AMARYL) 4 MG tablet 30 tablet 1     Sig: take 1 tablet every morning    Blood Pressure KIT 1 kit 0     Si Device by Does not apply route daily       Medications Discontinued During This Encounter   Medication Reason    labetalol (NORMODYNE) 300 MG tablet Reorder    COLCRYS 0.6 MG tablet Reorder    hydrALAZINE (APRESOLINE) 10 MG tablet Reorder    furosemide (LASIX) 20 MG tablet Reorder    lisinopril (PRINIVIL;ZESTRIL) 20 MG tablet Reorder    rosuvastatin (CRESTOR) 10 MG tablet Reorder    glimepiride (AMARYL) 4 MG tablet Reorder

## 2018-01-24 LAB
ANION GAP SERPL CALCULATED.3IONS-SCNC: 19 MMOL/L (ref 9–17)
BUN BLDV-MCNC: 16 MG/DL (ref 6–20)
BUN/CREAT BLD: ABNORMAL (ref 9–20)
CALCIUM SERPL-MCNC: 9.6 MG/DL (ref 8.6–10.4)
CHLORIDE BLD-SCNC: 95 MMOL/L (ref 98–107)
CHOLESTEROL/HDL RATIO: 3.1
CHOLESTEROL: 119 MG/DL
CO2: 22 MMOL/L (ref 20–31)
CREAT SERPL-MCNC: 1.32 MG/DL (ref 0.7–1.2)
GFR AFRICAN AMERICAN: >60 ML/MIN
GFR NON-AFRICAN AMERICAN: 58 ML/MIN
GFR SERPL CREATININE-BSD FRML MDRD: ABNORMAL ML/MIN/{1.73_M2}
GFR SERPL CREATININE-BSD FRML MDRD: ABNORMAL ML/MIN/{1.73_M2}
GLUCOSE BLD-MCNC: 102 MG/DL (ref 70–99)
HDLC SERPL-MCNC: 39 MG/DL
LDL CHOLESTEROL: 62 MG/DL (ref 0–130)
POTASSIUM SERPL-SCNC: 4.5 MMOL/L (ref 3.7–5.3)
SODIUM BLD-SCNC: 136 MMOL/L (ref 135–144)
TRIGL SERPL-MCNC: 90 MG/DL
VLDLC SERPL CALC-MCNC: ABNORMAL MG/DL (ref 1–30)

## 2018-02-21 DIAGNOSIS — E11.9 TYPE 2 DIABETES MELLITUS WITHOUT COMPLICATION, WITHOUT LONG-TERM CURRENT USE OF INSULIN (HCC): ICD-10-CM

## 2018-02-22 RX ORDER — GLIMEPIRIDE 4 MG/1
TABLET ORAL
Qty: 30 TABLET | Refills: 1 | Status: SHIPPED | OUTPATIENT
Start: 2018-02-22 | End: 2018-04-27 | Stop reason: DRUGHIGH

## 2018-03-18 DIAGNOSIS — I10 ESSENTIAL HYPERTENSION: ICD-10-CM

## 2018-03-22 RX ORDER — AMLODIPINE BESYLATE 10 MG/1
TABLET ORAL
Qty: 30 TABLET | Refills: 4 | Status: SHIPPED | OUTPATIENT
Start: 2018-03-22 | End: 2018-08-17 | Stop reason: SDUPTHER

## 2018-03-30 DIAGNOSIS — I10 ESSENTIAL HYPERTENSION: ICD-10-CM

## 2018-04-02 RX ORDER — HYDRALAZINE HYDROCHLORIDE 10 MG/1
TABLET, FILM COATED ORAL
Qty: 90 TABLET | Refills: 1 | Status: SHIPPED | OUTPATIENT
Start: 2018-04-02 | End: 2018-05-28 | Stop reason: SDUPTHER

## 2018-04-18 DIAGNOSIS — M1A.0720 CHRONIC GOUT OF LEFT FOOT, UNSPECIFIED CAUSE: ICD-10-CM

## 2018-04-18 RX ORDER — ALLOPURINOL 300 MG/1
TABLET ORAL
Qty: 30 TABLET | Refills: 3 | Status: SHIPPED | OUTPATIENT
Start: 2018-04-18 | End: 2018-08-11 | Stop reason: SDUPTHER

## 2018-04-27 ENCOUNTER — OFFICE VISIT (OUTPATIENT)
Dept: FAMILY MEDICINE CLINIC | Age: 48
End: 2018-04-27
Payer: MEDICARE

## 2018-04-27 VITALS
BODY MASS INDEX: 45.1 KG/M2 | DIASTOLIC BLOOD PRESSURE: 89 MMHG | HEART RATE: 72 BPM | SYSTOLIC BLOOD PRESSURE: 145 MMHG | HEIGHT: 70 IN | TEMPERATURE: 97.5 F | WEIGHT: 315 LBS

## 2018-04-27 DIAGNOSIS — E08.00 DIABETES MELLITUS DUE TO UNDERLYING CONDITION WITH HYPEROSMOLARITY WITHOUT COMA, WITHOUT LONG-TERM CURRENT USE OF INSULIN (HCC): Primary | ICD-10-CM

## 2018-04-27 DIAGNOSIS — I10 ESSENTIAL HYPERTENSION: ICD-10-CM

## 2018-04-27 DIAGNOSIS — E11.9 TYPE 2 DIABETES MELLITUS WITHOUT COMPLICATION, WITHOUT LONG-TERM CURRENT USE OF INSULIN (HCC): ICD-10-CM

## 2018-04-27 DIAGNOSIS — N18.30 STAGE 3 CHRONIC KIDNEY DISEASE (HCC): ICD-10-CM

## 2018-04-27 LAB — HBA1C MFR BLD: 5.2 %

## 2018-04-27 PROCEDURE — G8417 CALC BMI ABV UP PARAM F/U: HCPCS | Performed by: FAMILY MEDICINE

## 2018-04-27 PROCEDURE — G8427 DOCREV CUR MEDS BY ELIG CLIN: HCPCS | Performed by: FAMILY MEDICINE

## 2018-04-27 PROCEDURE — 3044F HG A1C LEVEL LT 7.0%: CPT | Performed by: FAMILY MEDICINE

## 2018-04-27 PROCEDURE — 99213 OFFICE O/P EST LOW 20 MIN: CPT | Performed by: FAMILY MEDICINE

## 2018-04-27 PROCEDURE — 83036 HEMOGLOBIN GLYCOSYLATED A1C: CPT | Performed by: FAMILY MEDICINE

## 2018-04-27 PROCEDURE — 2022F DILAT RTA XM EVC RTNOPTHY: CPT | Performed by: FAMILY MEDICINE

## 2018-04-27 PROCEDURE — 1036F TOBACCO NON-USER: CPT | Performed by: FAMILY MEDICINE

## 2018-04-27 PROCEDURE — 99213 OFFICE O/P EST LOW 20 MIN: CPT

## 2018-04-27 RX ORDER — LISINOPRIL 40 MG/1
TABLET ORAL
Qty: 30 TABLET | Refills: 3 | Status: SHIPPED | OUTPATIENT
Start: 2018-04-27 | End: 2018-08-27 | Stop reason: SDUPTHER

## 2018-04-27 RX ORDER — GLIMEPIRIDE 2 MG/1
2 TABLET ORAL
Qty: 30 TABLET | Refills: 3 | Status: SHIPPED | OUTPATIENT
Start: 2018-04-27 | End: 2018-08-28 | Stop reason: SDUPTHER

## 2018-04-27 ASSESSMENT — ENCOUNTER SYMPTOMS
NAUSEA: 0
SHORTNESS OF BREATH: 0
VOMITING: 0
DIARRHEA: 0

## 2018-04-28 DIAGNOSIS — I10 ESSENTIAL HYPERTENSION: ICD-10-CM

## 2018-05-02 RX ORDER — FUROSEMIDE 20 MG/1
TABLET ORAL
Qty: 30 TABLET | Refills: 1 | Status: SHIPPED | OUTPATIENT
Start: 2018-05-02 | End: 2018-06-26 | Stop reason: SDUPTHER

## 2018-05-28 DIAGNOSIS — I10 ESSENTIAL HYPERTENSION: ICD-10-CM

## 2018-05-29 RX ORDER — HYDRALAZINE HYDROCHLORIDE 10 MG/1
TABLET, FILM COATED ORAL
Qty: 90 TABLET | Refills: 0 | Status: SHIPPED | OUTPATIENT
Start: 2018-05-29 | End: 2018-07-06 | Stop reason: SDUPTHER

## 2018-06-06 DIAGNOSIS — E78.2 MIXED HYPERLIPIDEMIA: ICD-10-CM

## 2018-06-06 RX ORDER — ROSUVASTATIN CALCIUM 10 MG/1
TABLET, COATED ORAL
Qty: 30 TABLET | Refills: 3 | Status: SHIPPED | OUTPATIENT
Start: 2018-06-06 | End: 2018-09-25 | Stop reason: SDUPTHER

## 2018-06-17 DIAGNOSIS — I10 ESSENTIAL HYPERTENSION: ICD-10-CM

## 2018-06-18 RX ORDER — LABETALOL 300 MG/1
TABLET, FILM COATED ORAL
Qty: 60 TABLET | Refills: 3 | Status: SHIPPED | OUTPATIENT
Start: 2018-06-18 | End: 2018-10-23 | Stop reason: SDUPTHER

## 2018-06-19 RX ORDER — GLIMEPIRIDE 4 MG/1
TABLET ORAL
Qty: 30 TABLET | Refills: 1 | Status: SHIPPED | OUTPATIENT
Start: 2018-06-19 | End: 2018-08-17 | Stop reason: SDUPTHER

## 2018-06-26 DIAGNOSIS — I10 ESSENTIAL HYPERTENSION: ICD-10-CM

## 2018-06-29 RX ORDER — FUROSEMIDE 20 MG/1
TABLET ORAL
Qty: 30 TABLET | Refills: 1 | Status: SHIPPED | OUTPATIENT
Start: 2018-06-29 | End: 2018-08-28 | Stop reason: SDUPTHER

## 2018-07-06 DIAGNOSIS — I10 ESSENTIAL HYPERTENSION: ICD-10-CM

## 2018-07-06 NOTE — TELEPHONE ENCOUNTER
Medication is on med list please review and address    Please address the medication refill and close the encounter. If I can be of assistance, please route to the applicable pool. Thank you. Next Visit Date:  No future appointments. Health Maintenance   Topic Date Due    Diabetic retinal exam  11/19/1979    DTaP/Tdap/Td vaccine (1 - Tdap) 12/23/2018 (Originally 11/19/1988)    Flu vaccine (1) 12/23/2018 (Originally 9/1/2018)    Diabetic microalbuminuria test  01/23/2019    Lipid screen  01/23/2019    Potassium monitoring  01/23/2019    Creatinine monitoring  01/23/2019    Diabetic foot exam  04/27/2019    A1C test (Diabetic or Prediabetic)  04/27/2019    Pneumococcal med risk  Completed    HIV screen  Completed       Hemoglobin A1C (%)   Date Value   04/27/2018 5.2   01/23/2018 5.1   06/02/2017 8.2             ( goal A1C is < 7)   Microalb/Crt.  Ratio (mcg/mg creat)   Date Value   01/23/2018 206 (H)     LDL Cholesterol (mg/dL)   Date Value   01/23/2018 62       (goal LDL is <100)   AST (U/L)   Date Value   05/25/2017 28     ALT (U/L)   Date Value   05/25/2017 28     BUN (mg/dL)   Date Value   01/23/2018 16     BP Readings from Last 3 Encounters:   04/27/18 (!) 145/89   01/23/18 136/80   07/27/17 (!) 140/92          (goal 120/80)    All Future Testing planned in [facility]  Lab Frequency Next Occurrence               Patient Active Problem List:     HTN (hypertension)     Chronic back pain     Morbid obesity with BMI of 50.0-59.9, adult (HCC)     ZURDO on CPAP     Gout     CKD (chronic kidney disease)     HLD (hyperlipidemia)     Squamous cell carcinoma of face     Type 2 diabetes mellitus without complication, without long-term current use of insulin ([address]

## 2018-07-08 RX ORDER — HYDRALAZINE HYDROCHLORIDE 10 MG/1
TABLET, FILM COATED ORAL
Qty: 90 TABLET | Refills: 0 | Status: SHIPPED | OUTPATIENT
Start: 2018-07-08 | End: 2018-07-30 | Stop reason: SDUPTHER

## 2018-07-30 DIAGNOSIS — I10 ESSENTIAL HYPERTENSION: ICD-10-CM

## 2018-08-01 RX ORDER — HYDRALAZINE HYDROCHLORIDE 10 MG/1
TABLET, FILM COATED ORAL
Qty: 90 TABLET | Refills: 0 | Status: SHIPPED | OUTPATIENT
Start: 2018-08-01 | End: 2019-06-11 | Stop reason: SDUPTHER

## 2018-08-11 DIAGNOSIS — M1A.0720 CHRONIC GOUT OF LEFT FOOT, UNSPECIFIED CAUSE: ICD-10-CM

## 2018-08-15 RX ORDER — ALLOPURINOL 300 MG/1
TABLET ORAL
Qty: 30 TABLET | Refills: 3 | Status: SHIPPED | OUTPATIENT
Start: 2018-08-15 | End: 2018-12-21 | Stop reason: SDUPTHER

## 2018-08-17 DIAGNOSIS — I10 ESSENTIAL HYPERTENSION: ICD-10-CM

## 2018-08-18 RX ORDER — AMLODIPINE BESYLATE 10 MG/1
TABLET ORAL
Qty: 30 TABLET | Refills: 4 | Status: SHIPPED | OUTPATIENT
Start: 2018-08-18 | End: 2019-06-11 | Stop reason: SDUPTHER

## 2018-08-18 RX ORDER — GLIMEPIRIDE 4 MG/1
TABLET ORAL
Qty: 30 TABLET | Refills: 1 | Status: SHIPPED | OUTPATIENT
Start: 2018-08-18 | End: 2019-02-01

## 2018-08-27 DIAGNOSIS — N18.30 STAGE 3 CHRONIC KIDNEY DISEASE (HCC): ICD-10-CM

## 2018-08-27 DIAGNOSIS — I10 ESSENTIAL HYPERTENSION: ICD-10-CM

## 2018-08-28 DIAGNOSIS — I10 ESSENTIAL HYPERTENSION: ICD-10-CM

## 2018-08-28 DIAGNOSIS — E11.9 TYPE 2 DIABETES MELLITUS WITHOUT COMPLICATION, WITHOUT LONG-TERM CURRENT USE OF INSULIN (HCC): ICD-10-CM

## 2018-08-29 RX ORDER — GLIMEPIRIDE 2 MG/1
TABLET ORAL
Qty: 30 TABLET | Refills: 3 | Status: SHIPPED | OUTPATIENT
Start: 2018-08-29 | End: 2018-12-21 | Stop reason: SDUPTHER

## 2018-08-29 RX ORDER — LISINOPRIL 40 MG/1
TABLET ORAL
Qty: 30 TABLET | Refills: 3 | Status: SHIPPED | OUTPATIENT
Start: 2018-08-29 | End: 2018-12-26 | Stop reason: SDUPTHER

## 2018-08-29 RX ORDER — FUROSEMIDE 20 MG/1
TABLET ORAL
Qty: 30 TABLET | Refills: 1 | Status: SHIPPED | OUTPATIENT
Start: 2018-08-29 | End: 2018-10-23 | Stop reason: SDUPTHER

## 2018-09-25 DIAGNOSIS — E78.2 MIXED HYPERLIPIDEMIA: ICD-10-CM

## 2018-09-26 RX ORDER — ROSUVASTATIN CALCIUM 10 MG/1
TABLET, COATED ORAL
Qty: 30 TABLET | Refills: 3 | Status: SHIPPED | OUTPATIENT
Start: 2018-09-26 | End: 2019-06-11 | Stop reason: SDUPTHER

## 2018-10-23 DIAGNOSIS — I10 ESSENTIAL HYPERTENSION: ICD-10-CM

## 2018-10-29 RX ORDER — FUROSEMIDE 20 MG/1
TABLET ORAL
Qty: 30 TABLET | Refills: 1 | Status: SHIPPED | OUTPATIENT
Start: 2018-10-29 | End: 2018-12-26 | Stop reason: SDUPTHER

## 2018-10-29 RX ORDER — LABETALOL 300 MG/1
TABLET, FILM COATED ORAL
Qty: 60 TABLET | Refills: 3 | Status: SHIPPED | OUTPATIENT
Start: 2018-10-29 | End: 2019-02-18 | Stop reason: SDUPTHER

## 2018-12-21 DIAGNOSIS — M1A.0720 CHRONIC GOUT OF LEFT FOOT, UNSPECIFIED CAUSE: ICD-10-CM

## 2018-12-21 DIAGNOSIS — E11.9 TYPE 2 DIABETES MELLITUS WITHOUT COMPLICATION, WITHOUT LONG-TERM CURRENT USE OF INSULIN (HCC): ICD-10-CM

## 2018-12-21 RX ORDER — GLIMEPIRIDE 2 MG/1
TABLET ORAL
Qty: 30 TABLET | Refills: 3 | Status: SHIPPED | OUTPATIENT
Start: 2018-12-21 | End: 2019-02-01 | Stop reason: SDUPTHER

## 2018-12-21 RX ORDER — ALLOPURINOL 300 MG/1
TABLET ORAL
Qty: 30 TABLET | Refills: 3 | Status: SHIPPED | OUTPATIENT
Start: 2018-12-21 | End: 2019-04-20 | Stop reason: SDUPTHER

## 2018-12-26 DIAGNOSIS — N18.30 STAGE 3 CHRONIC KIDNEY DISEASE (HCC): ICD-10-CM

## 2018-12-26 DIAGNOSIS — I10 ESSENTIAL HYPERTENSION: ICD-10-CM

## 2018-12-26 DIAGNOSIS — M1A.0720 CHRONIC GOUT OF LEFT FOOT, UNSPECIFIED CAUSE: ICD-10-CM

## 2018-12-27 RX ORDER — COLCHICINE 0.6 MG/1
TABLET, FILM COATED ORAL
Qty: 30 TABLET | Refills: 3 | Status: SHIPPED | OUTPATIENT
Start: 2018-12-27 | End: 2019-06-11 | Stop reason: SDUPTHER

## 2018-12-27 RX ORDER — FUROSEMIDE 20 MG/1
TABLET ORAL
Qty: 30 TABLET | Refills: 1 | Status: SHIPPED | OUTPATIENT
Start: 2018-12-27 | End: 2019-02-27 | Stop reason: SDUPTHER

## 2018-12-27 RX ORDER — LISINOPRIL 40 MG/1
TABLET ORAL
Qty: 30 TABLET | Refills: 3 | Status: SHIPPED | OUTPATIENT
Start: 2018-12-27 | End: 2019-04-20 | Stop reason: SDUPTHER

## 2019-02-01 ENCOUNTER — OFFICE VISIT (OUTPATIENT)
Dept: FAMILY MEDICINE CLINIC | Age: 49
End: 2019-02-01
Payer: MEDICARE

## 2019-02-01 ENCOUNTER — HOSPITAL ENCOUNTER (OUTPATIENT)
Age: 49
Setting detail: SPECIMEN
Discharge: HOME | End: 2019-02-01
Payer: MEDICARE

## 2019-02-01 VITALS
HEART RATE: 97 BPM | DIASTOLIC BLOOD PRESSURE: 80 MMHG | TEMPERATURE: 99.1 F | SYSTOLIC BLOOD PRESSURE: 139 MMHG | HEIGHT: 70 IN | BODY MASS INDEX: 45.1 KG/M2 | WEIGHT: 315 LBS

## 2019-02-01 DIAGNOSIS — M54.50 CHRONIC BILATERAL LOW BACK PAIN WITHOUT SCIATICA: Primary | ICD-10-CM

## 2019-02-01 DIAGNOSIS — Z00.00 HEALTH CARE MAINTENANCE: ICD-10-CM

## 2019-02-01 DIAGNOSIS — E66.01 MORBID OBESITY WITH BMI OF 50.0-59.9, ADULT (HCC): ICD-10-CM

## 2019-02-01 DIAGNOSIS — Z23 IMMUNIZATION DUE: ICD-10-CM

## 2019-02-01 DIAGNOSIS — G89.29 CHRONIC BILATERAL LOW BACK PAIN WITHOUT SCIATICA: Primary | ICD-10-CM

## 2019-02-01 LAB
CREATININE URINE: 98.4 MG/DL (ref 39–259)
HBA1C MFR BLD: 5.2 %
MICROALBUMIN/CREAT 24H UR: 193 MG/L
MICROALBUMIN/CREAT UR-RTO: 196 MCG/MG CREAT

## 2019-02-01 PROCEDURE — G8417 CALC BMI ABV UP PARAM F/U: HCPCS | Performed by: FAMILY MEDICINE

## 2019-02-01 PROCEDURE — 83036 HEMOGLOBIN GLYCOSYLATED A1C: CPT | Performed by: FAMILY MEDICINE

## 2019-02-01 PROCEDURE — 99213 OFFICE O/P EST LOW 20 MIN: CPT | Performed by: FAMILY MEDICINE

## 2019-02-01 PROCEDURE — 1036F TOBACCO NON-USER: CPT | Performed by: FAMILY MEDICINE

## 2019-02-01 PROCEDURE — G8484 FLU IMMUNIZE NO ADMIN: HCPCS | Performed by: FAMILY MEDICINE

## 2019-02-01 PROCEDURE — G8427 DOCREV CUR MEDS BY ELIG CLIN: HCPCS | Performed by: FAMILY MEDICINE

## 2019-02-01 PROCEDURE — 90715 TDAP VACCINE 7 YRS/> IM: CPT | Performed by: FAMILY MEDICINE

## 2019-02-01 PROCEDURE — 99211 OFF/OP EST MAY X REQ PHY/QHP: CPT | Performed by: FAMILY MEDICINE

## 2019-02-01 RX ORDER — GLIMEPIRIDE 1 MG/1
1 TABLET ORAL
Qty: 30 TABLET | Refills: 3 | Status: SHIPPED | OUTPATIENT
Start: 2019-02-01 | End: 2019-06-11 | Stop reason: SDUPTHER

## 2019-02-01 RX ORDER — ACETAMINOPHEN 500 MG
1000 TABLET ORAL 2 TIMES DAILY PRN
Qty: 120 TABLET | Refills: 3 | Status: SHIPPED | OUTPATIENT
Start: 2019-02-01 | End: 2019-06-11 | Stop reason: SDUPTHER

## 2019-02-01 ASSESSMENT — PATIENT HEALTH QUESTIONNAIRE - PHQ9
2. FEELING DOWN, DEPRESSED OR HOPELESS: 0
1. LITTLE INTEREST OR PLEASURE IN DOING THINGS: 0
SUM OF ALL RESPONSES TO PHQ9 QUESTIONS 1 & 2: 0
SUM OF ALL RESPONSES TO PHQ QUESTIONS 1-9: 0
SUM OF ALL RESPONSES TO PHQ QUESTIONS 1-9: 0

## 2019-02-01 ASSESSMENT — ENCOUNTER SYMPTOMS
DIARRHEA: 0
VOMITING: 0
NAUSEA: 0
SHORTNESS OF BREATH: 0
ABDOMINAL PAIN: 0
BLOOD IN STOOL: 0

## 2019-02-08 ENCOUNTER — HOSPITAL ENCOUNTER (OUTPATIENT)
Age: 49
Setting detail: SPECIMEN
Discharge: HOME | End: 2019-02-08
Payer: MEDICARE

## 2019-02-08 DIAGNOSIS — E66.01 MORBID OBESITY WITH BMI OF 50.0-59.9, ADULT (HCC): ICD-10-CM

## 2019-02-08 LAB
ANION GAP SERPL CALCULATED.3IONS-SCNC: 15 MMOL/L (ref 9–17)
BUN BLDV-MCNC: 17 MG/DL (ref 6–20)
BUN/CREAT BLD: ABNORMAL (ref 9–20)
CALCIUM SERPL-MCNC: 9.5 MG/DL (ref 8.6–10.4)
CHLORIDE BLD-SCNC: 101 MMOL/L (ref 98–107)
CO2: 23 MMOL/L (ref 20–31)
CREAT SERPL-MCNC: 1.27 MG/DL (ref 0.7–1.2)
GFR AFRICAN AMERICAN: >60 ML/MIN
GFR NON-AFRICAN AMERICAN: >60 ML/MIN
GFR SERPL CREATININE-BSD FRML MDRD: ABNORMAL ML/MIN/{1.73_M2}
GFR SERPL CREATININE-BSD FRML MDRD: ABNORMAL ML/MIN/{1.73_M2}
GLUCOSE BLD-MCNC: 99 MG/DL (ref 70–99)
POTASSIUM SERPL-SCNC: 4.4 MMOL/L (ref 3.7–5.3)
SODIUM BLD-SCNC: 139 MMOL/L (ref 135–144)

## 2019-02-18 DIAGNOSIS — I10 ESSENTIAL HYPERTENSION: ICD-10-CM

## 2019-02-19 RX ORDER — LABETALOL 300 MG/1
TABLET, FILM COATED ORAL
Qty: 60 TABLET | Refills: 3 | Status: SHIPPED | OUTPATIENT
Start: 2019-02-19 | End: 2019-06-11 | Stop reason: SDUPTHER

## 2019-02-27 DIAGNOSIS — I10 ESSENTIAL HYPERTENSION: ICD-10-CM

## 2019-03-04 RX ORDER — FUROSEMIDE 20 MG/1
TABLET ORAL
Qty: 30 TABLET | Refills: 1 | Status: SHIPPED | OUTPATIENT
Start: 2019-03-04 | End: 2019-04-26 | Stop reason: SDUPTHER

## 2019-04-20 DIAGNOSIS — N18.30 STAGE 3 CHRONIC KIDNEY DISEASE (HCC): ICD-10-CM

## 2019-04-20 DIAGNOSIS — I10 ESSENTIAL HYPERTENSION: ICD-10-CM

## 2019-04-22 RX ORDER — LISINOPRIL 40 MG/1
TABLET ORAL
Qty: 30 TABLET | Refills: 3 | Status: SHIPPED | OUTPATIENT
Start: 2019-04-22 | End: 2019-06-11 | Stop reason: SDUPTHER

## 2019-04-26 DIAGNOSIS — I10 ESSENTIAL HYPERTENSION: ICD-10-CM

## 2019-04-26 RX ORDER — FUROSEMIDE 20 MG/1
TABLET ORAL
Qty: 30 TABLET | Refills: 1 | Status: SHIPPED | OUTPATIENT
Start: 2019-04-26 | End: 2019-06-11 | Stop reason: SDUPTHER

## 2019-04-26 NOTE — TELEPHONE ENCOUNTER
Please address the medication refill and close the encounter. If I can be of assistance, please route to the applicable pool. Thank you. Last visit: [id number]  Last Med refill: [id number]  Does patient have enough medication for 72 hours:  Next Visit Date:  No future appointments. Health Maintenance   Topic Date Due    Diabetic retinal exam  11/19/1979    Hepatitis B Vaccine (1 of 3 - Risk 3-dose series) 11/19/1988    Lipid screen  01/23/2019    Diabetic foot exam  04/27/2019    Flu vaccine (Season Ended) 09/01/2019    A1C test (Diabetic or Prediabetic)  02/01/2020    Diabetic microalbuminuria test  02/01/2020    Potassium monitoring  02/08/2020    Creatinine monitoring  02/08/2020    DTaP/Tdap/Td vaccine (2 - Td) 02/01/2029    Pneumococcal 0-64 years Vaccine  Completed    HIV screen  Completed       Hemoglobin A1C (%)   Date Value   02/01/2019 5.2   04/27/2018 5.2   01/23/2018 5.1             ( goal A1C is < 7)   Microalb/Crt.  Ratio (mcg/mg creat)   Date Value   02/01/2019 196 (H)     LDL Cholesterol (mg/dL)   Date Value   01/23/2018 62   11/22/2016 60       (goal LDL is <100)   AST (U/L)   Date Value   05/25/2017 28     ALT (U/L)   Date Value   05/25/2017 28     BUN (mg/dL)   Date Value   02/08/2019 17     BP Readings from Last 3 Encounters:   02/01/19 139/80   04/27/18 (!) 145/89   01/23/18 136/80          (goal 120/80)    All Future Testing planned in [facility]  Lab Frequency Next Occurrence               Patient Active Problem List:     HTN (hypertension)     Chronic back pain     Morbid obesity with BMI of 50.0-59.9, adult (HCC)     ZURDO on CPAP     Gout     CKD (chronic kidney disease)     HLD (hyperlipidemia)     Squamous cell carcinoma of face     Type 2 diabetes mellitus without complication, without long-term current use of insulin ([facility] [address]

## 2019-05-13 DIAGNOSIS — E11.9 TYPE 2 DIABETES MELLITUS WITHOUT COMPLICATION, WITHOUT LONG-TERM CURRENT USE OF INSULIN (HCC): ICD-10-CM

## 2019-05-13 RX ORDER — GLIMEPIRIDE 2 MG/1
TABLET ORAL
Qty: 30 TABLET | Refills: 3 | OUTPATIENT
Start: 2019-05-13

## 2019-06-11 ENCOUNTER — OFFICE VISIT (OUTPATIENT)
Dept: FAMILY MEDICINE CLINIC | Age: 49
End: 2019-06-11
Payer: MEDICARE

## 2019-06-11 ENCOUNTER — HOSPITAL ENCOUNTER (OUTPATIENT)
Age: 49
Setting detail: SPECIMEN
Discharge: HOME | End: 2019-06-11
Payer: MEDICARE

## 2019-06-11 VITALS
TEMPERATURE: 97.4 F | HEIGHT: 70 IN | HEART RATE: 69 BPM | BODY MASS INDEX: 45.1 KG/M2 | DIASTOLIC BLOOD PRESSURE: 101 MMHG | SYSTOLIC BLOOD PRESSURE: 176 MMHG | WEIGHT: 315 LBS

## 2019-06-11 DIAGNOSIS — I10 ESSENTIAL HYPERTENSION: ICD-10-CM

## 2019-06-11 DIAGNOSIS — N18.30 STAGE 3 CHRONIC KIDNEY DISEASE (HCC): ICD-10-CM

## 2019-06-11 DIAGNOSIS — E11.9 TYPE 2 DIABETES MELLITUS WITHOUT COMPLICATION, WITHOUT LONG-TERM CURRENT USE OF INSULIN (HCC): ICD-10-CM

## 2019-06-11 DIAGNOSIS — E55.9 VITAMIN D DEFICIENCY: Primary | ICD-10-CM

## 2019-06-11 DIAGNOSIS — M54.50 CHRONIC BILATERAL LOW BACK PAIN WITHOUT SCIATICA: ICD-10-CM

## 2019-06-11 DIAGNOSIS — M1A.0720 CHRONIC GOUT OF LEFT FOOT, UNSPECIFIED CAUSE: ICD-10-CM

## 2019-06-11 DIAGNOSIS — G89.29 CHRONIC BILATERAL LOW BACK PAIN WITHOUT SCIATICA: ICD-10-CM

## 2019-06-11 DIAGNOSIS — H61.23 BILATERAL IMPACTED CERUMEN: ICD-10-CM

## 2019-06-11 DIAGNOSIS — E78.2 MIXED HYPERLIPIDEMIA: ICD-10-CM

## 2019-06-11 LAB
CHOLESTEROL/HDL RATIO: 5
CHOLESTEROL: 169 MG/DL
HBA1C MFR BLD: 5.1 %
HDLC SERPL-MCNC: 34 MG/DL
LDL CHOLESTEROL: 113 MG/DL (ref 0–130)
TRIGL SERPL-MCNC: 112 MG/DL
VLDLC SERPL CALC-MCNC: ABNORMAL MG/DL (ref 1–30)

## 2019-06-11 PROCEDURE — 83036 HEMOGLOBIN GLYCOSYLATED A1C: CPT | Performed by: FAMILY MEDICINE

## 2019-06-11 PROCEDURE — G8417 CALC BMI ABV UP PARAM F/U: HCPCS | Performed by: FAMILY MEDICINE

## 2019-06-11 PROCEDURE — 1036F TOBACCO NON-USER: CPT | Performed by: FAMILY MEDICINE

## 2019-06-11 PROCEDURE — 99211 OFF/OP EST MAY X REQ PHY/QHP: CPT | Performed by: FAMILY MEDICINE

## 2019-06-11 PROCEDURE — 99213 OFFICE O/P EST LOW 20 MIN: CPT | Performed by: FAMILY MEDICINE

## 2019-06-11 PROCEDURE — G8427 DOCREV CUR MEDS BY ELIG CLIN: HCPCS | Performed by: FAMILY MEDICINE

## 2019-06-11 PROCEDURE — 2022F DILAT RTA XM EVC RTNOPTHY: CPT | Performed by: FAMILY MEDICINE

## 2019-06-11 PROCEDURE — 3044F HG A1C LEVEL LT 7.0%: CPT | Performed by: FAMILY MEDICINE

## 2019-06-11 RX ORDER — AMLODIPINE BESYLATE 10 MG/1
TABLET ORAL
Qty: 30 TABLET | Refills: 4 | Status: SHIPPED | OUTPATIENT
Start: 2019-06-11 | End: 2020-06-01 | Stop reason: SDUPTHER

## 2019-06-11 RX ORDER — BLOOD PRESSURE TEST KIT
1 KIT MISCELLANEOUS DAILY
Qty: 1 KIT | Refills: 0 | Status: SHIPPED | OUTPATIENT
Start: 2019-06-11 | End: 2021-02-19

## 2019-06-11 RX ORDER — ROSUVASTATIN CALCIUM 10 MG/1
TABLET, COATED ORAL
Qty: 30 TABLET | Refills: 3 | Status: SHIPPED | OUTPATIENT
Start: 2019-06-11 | End: 2019-09-30 | Stop reason: SDUPTHER

## 2019-06-11 RX ORDER — GLUCOSAMINE/CHONDR SU A SOD 750-600 MG
1 TABLET ORAL DAILY
Qty: 30 CAPSULE | Refills: 0 | Status: SHIPPED | OUTPATIENT
Start: 2019-06-11 | End: 2021-02-19 | Stop reason: SDUPTHER

## 2019-06-11 RX ORDER — FUROSEMIDE 20 MG/1
TABLET ORAL
Qty: 30 TABLET | Refills: 1 | Status: SHIPPED | OUTPATIENT
Start: 2019-06-11 | End: 2020-06-01 | Stop reason: ALTCHOICE

## 2019-06-11 RX ORDER — HYDRALAZINE HYDROCHLORIDE 10 MG/1
TABLET, FILM COATED ORAL
Qty: 90 TABLET | Refills: 0 | Status: SHIPPED | OUTPATIENT
Start: 2019-06-11 | End: 2019-07-18 | Stop reason: SDUPTHER

## 2019-06-11 RX ORDER — LABETALOL 300 MG/1
TABLET, FILM COATED ORAL
Qty: 60 TABLET | Refills: 3 | Status: SHIPPED | OUTPATIENT
Start: 2019-06-11 | End: 2020-06-01 | Stop reason: SDUPTHER

## 2019-06-11 RX ORDER — CLOTRIMAZOLE AND BETAMETHASONE DIPROPIONATE 10; .64 MG/G; MG/G
CREAM TOPICAL
Qty: 1 TUBE | Refills: 0 | Status: CANCELLED | OUTPATIENT
Start: 2019-06-11

## 2019-06-11 RX ORDER — LISINOPRIL 40 MG/1
TABLET ORAL
Qty: 30 TABLET | Refills: 3 | Status: SHIPPED | OUTPATIENT
Start: 2019-06-11 | End: 2020-01-02 | Stop reason: SDUPTHER

## 2019-06-11 RX ORDER — ACETAMINOPHEN 500 MG
1000 TABLET ORAL 2 TIMES DAILY PRN
Qty: 120 TABLET | Refills: 3 | Status: SHIPPED | OUTPATIENT
Start: 2019-06-11 | End: 2020-07-15 | Stop reason: SDUPTHER

## 2019-06-11 RX ORDER — ALLOPURINOL 300 MG/1
TABLET ORAL
Qty: 30 TABLET | Refills: 3 | Status: SHIPPED | OUTPATIENT
Start: 2019-06-11 | End: 2020-06-01 | Stop reason: ALTCHOICE

## 2019-06-11 RX ORDER — COLCHICINE 0.6 MG/1
TABLET ORAL
Qty: 30 TABLET | Refills: 3 | Status: SHIPPED | OUTPATIENT
Start: 2019-06-11 | End: 2020-02-27 | Stop reason: SDUPTHER

## 2019-06-11 RX ORDER — GLIMEPIRIDE 1 MG/1
1 TABLET ORAL
Qty: 30 TABLET | Refills: 3 | Status: SHIPPED | OUTPATIENT
Start: 2019-06-11 | End: 2019-06-11 | Stop reason: ALTCHOICE

## 2019-06-11 ASSESSMENT — ENCOUNTER SYMPTOMS
VOMITING: 0
SHORTNESS OF BREATH: 0
NAUSEA: 0
DIARRHEA: 0

## 2019-06-11 NOTE — PROGRESS NOTES
Pressure KIT; 1 Device by Does not apply route daily  Dispense: 1 kit; Refill: 0    3. Stage 3 chronic kidney disease (HCC)  - lisinopril (PRINIVIL;ZESTRIL) 40 MG tablet; take 1 tablet by mouth once daily  Dispense: 30 tablet; Refill: 3    4. Chronic gout of left foot, unspecified cause  - colchicine (COLCRYS) 0.6 MG tablet; take 1 tablet by mouth twice a day DURING GOUT FLARE UP  Dispense: 30 tablet; Refill: 3  - allopurinol (ZYLOPRIM) 300 MG tablet; take 1 tablet by mouth at bedtime  Dispense: 30 tablet; Refill: 3    5. Bilateral impacted cerumen  - carbamide peroxide (AURO EARDROPS) 6.5 % otic solution; Place 5 drops into both ears 2 times daily  Dispense: 1 Bottle; Refill: 1    6. Chronic bilateral low back pain without sciatica  - acetaminophen (APAP EXTRA STRENGTH) 500 MG tablet; Take 2 tablets by mouth 2 times daily as needed for Pain  Dispense: 120 tablet; Refill: 3    7. Vitamin D deficiency  - RA VITAMIN D-3 2000 units CAPS; Take 1 capsule by mouth daily  Dispense: 30 capsule; Refill: 0    8. Type 2 diabetes mellitus without complication, without long-term current use of insulin ([facility])  - will DC amaryl at this time  - Lipid Panel; Future  -  DIABETES FOOT EXAM  - POCT glycosylated hemoglobin (Hb A1C)        Return in about 2 weeks (around 6/25/2019) for fu htn. Carmelina Washburnn received counseling on the following healthy behaviors: nutrition, exercise and medication adherence  Reviewed prior labs and health maintenance  Continue current medications, diet and exercise. Discussed use, benefit, and side effects of prescribed medications. Barriers to medication compliance addressed. Patient given educational materials - see patient instructions  Was a self-tracking handout given in paper form or via [employer]?  No:     Requested Prescriptions     Signed Prescriptions Disp Refills    rosuvastatin (CRESTOR) 10 MG tablet 30 tablet 3     Sig: take 1 tablet by mouth once daily    RA VITAMIN D-3 2000 units CAPS 30 capsule 0     Sig: Take 1 capsule by mouth daily    lisinopril (PRINIVIL;ZESTRIL) 40 MG tablet 30 tablet 3     Sig: take 1 tablet by mouth once daily    labetalol (NORMODYNE) 300 MG tablet 60 tablet 3     Sig: take 1 tablet by mouth twice a day    hydrALAZINE (APRESOLINE) 10 MG tablet 90 tablet 0     Sig: take 1 tablet by mouth three times a day    furosemide (LASIX) 20 MG tablet 30 tablet 1     Sig: take 1 tablet by mouth once daily    colchicine (COLCRYS) 0.6 MG tablet 30 tablet 3     Sig: take 1 tablet by mouth twice a day DURING GOUT FLARE UP    carbamide peroxide (AURO EARDROPS) 6.5 % otic solution 1 Bottle 1     Sig: Place 5 drops into both ears 2 times daily    Blood Pressure KIT 1 kit 0     Si Device by Does not apply route daily    amLODIPine (NORVASC) 10 MG tablet 30 tablet 4     Sig: take 1 tablet by mouth once daily    allopurinol (ZYLOPRIM) 300 MG tablet 30 tablet 3     Sig: take 1 tablet by mouth at bedtime    acetaminophen (APAP EXTRA STRENGTH) 500 MG tablet 120 tablet 3     Sig: Take 2 tablets by mouth 2 times daily as needed for Pain    Blood Pressure KIT 1 kit 0     Si Device by Does not apply route daily       All patient questions answered. Patient voiced understanding. Quality Measures    Body mass index is 50.65 kg/m². Elevated. Weight control planned discussed Healthy diet and regular exercise. BP: (!) 176/101 Blood pressure is very high. Treatment plan consists of Weight Reduction, DASH Eating Plan, Dietary Sodium Restriction, Increased Physical Activity and Patient In-home Blood Pressure Monitoring.     Lab Results   Component Value Date    LDLCHOLESTEROL 62 2018    (goal LDL reduction with dx if diabetes is 50% LDL reduction)      PHQ Scores 2019   PHQ2 Score 0 0   PHQ9 Score 0 0     Interpretation of Total Score Depression Severity: 1-4 = Minimal depression, 5-9 = Mild depression, 10-14 = Moderate depression, 15-19 = Moderately severe (NORVASC) 10 MG tablet REORDER    allopurinol (ZYLOPRIM) 300 MG tablet REORDER    acetaminophen (APAP EXTRA STRENGTH) 500 MG tablet REORDER    glimepiride (AMARYL) 1 MG tablet Therapy completed       Rochelle Moya MD

## 2019-06-11 NOTE — PROGRESS NOTES
Visit Information    Have you changed or started any medications since your last visit including any over-the-counter medicines, vitamins, or herbal medicines? no   Have you stopped taking any of your medications? Is so, why? -  no  Are you having any side effects from any of your medications? - no    Have you seen any other physician or provider since your last visit?  no   Have you had any other diagnostic tests since your last visit?  no   Have you been seen in the emergency room and/or had an admission in a hospital since we last saw you?  no   Have you had your routine dental cleaning in the past 6 months?  no     Do you have an active MyChart account? If no, what is the barrier?   Yes    Patient Care Team:  Lenka Rae MD as PCP - General (Family Medicine)    Medical History Review  Past Medical, Family, and Social History reviewed and does not contribute to the patient presenting condition    Health Maintenance   Topic Date Due    Diabetic retinal exam  11/19/1979    Hepatitis B Vaccine (1 of 3 - Risk 3-dose series) 11/19/1988    Lipid screen  01/23/2019    Diabetic foot exam  04/27/2019    Flu vaccine (Season Ended) 09/01/2019    A1C test (Diabetic or Prediabetic)  02/01/2020    Diabetic microalbuminuria test  02/01/2020    Potassium monitoring  02/08/2020    Creatinine monitoring  02/08/2020    DTaP/Tdap/Td vaccine (2 - Td) 02/01/2029    Pneumococcal 0-64 years Vaccine  Completed    HIV screen  Completed

## 2019-07-12 DIAGNOSIS — I10 ESSENTIAL HYPERTENSION: ICD-10-CM

## 2019-07-18 DIAGNOSIS — I10 ESSENTIAL HYPERTENSION: ICD-10-CM

## 2019-07-18 RX ORDER — HYDRALAZINE HYDROCHLORIDE 10 MG/1
TABLET, FILM COATED ORAL
Qty: 90 TABLET | Refills: 1 | Status: SHIPPED | OUTPATIENT
Start: 2019-07-18 | End: 2020-02-13

## 2019-07-19 RX ORDER — HYDRALAZINE HYDROCHLORIDE 10 MG/1
TABLET, FILM COATED ORAL
Qty: 90 TABLET | Refills: 0 | Status: SHIPPED | OUTPATIENT
Start: 2019-07-19 | End: 2019-10-17 | Stop reason: SDUPTHER

## 2019-08-31 DIAGNOSIS — N18.30 STAGE 3 CHRONIC KIDNEY DISEASE (HCC): ICD-10-CM

## 2019-08-31 DIAGNOSIS — I10 ESSENTIAL HYPERTENSION: ICD-10-CM

## 2019-09-03 RX ORDER — LISINOPRIL 40 MG/1
TABLET ORAL
Qty: 30 TABLET | Refills: 3 | OUTPATIENT
Start: 2019-09-03

## 2019-09-30 DIAGNOSIS — E78.2 MIXED HYPERLIPIDEMIA: ICD-10-CM

## 2019-09-30 RX ORDER — ROSUVASTATIN CALCIUM 10 MG/1
TABLET, COATED ORAL
Qty: 30 TABLET | Refills: 3 | Status: SHIPPED | OUTPATIENT
Start: 2019-09-30 | End: 2019-10-17

## 2019-10-17 ENCOUNTER — OFFICE VISIT (OUTPATIENT)
Dept: FAMILY MEDICINE CLINIC | Age: 49
End: 2019-10-17
Payer: MEDICARE

## 2019-10-17 VITALS
SYSTOLIC BLOOD PRESSURE: 154 MMHG | HEIGHT: 70 IN | HEART RATE: 74 BPM | DIASTOLIC BLOOD PRESSURE: 90 MMHG | BODY MASS INDEX: 45.1 KG/M2 | WEIGHT: 315 LBS

## 2019-10-17 DIAGNOSIS — E11.9 TYPE 2 DIABETES MELLITUS WITHOUT COMPLICATION, WITHOUT LONG-TERM CURRENT USE OF INSULIN (HCC): ICD-10-CM

## 2019-10-17 DIAGNOSIS — I10 ESSENTIAL HYPERTENSION: Primary | ICD-10-CM

## 2019-10-17 DIAGNOSIS — E78.2 MIXED HYPERLIPIDEMIA: ICD-10-CM

## 2019-10-17 DIAGNOSIS — G47.33 OSA (OBSTRUCTIVE SLEEP APNEA): ICD-10-CM

## 2019-10-17 LAB — HBA1C MFR BLD: 5.3 %

## 2019-10-17 PROCEDURE — 2022F DILAT RTA XM EVC RTNOPTHY: CPT | Performed by: STUDENT IN AN ORGANIZED HEALTH CARE EDUCATION/TRAINING PROGRAM

## 2019-10-17 PROCEDURE — G8417 CALC BMI ABV UP PARAM F/U: HCPCS | Performed by: STUDENT IN AN ORGANIZED HEALTH CARE EDUCATION/TRAINING PROGRAM

## 2019-10-17 PROCEDURE — G8484 FLU IMMUNIZE NO ADMIN: HCPCS | Performed by: STUDENT IN AN ORGANIZED HEALTH CARE EDUCATION/TRAINING PROGRAM

## 2019-10-17 PROCEDURE — G8427 DOCREV CUR MEDS BY ELIG CLIN: HCPCS | Performed by: STUDENT IN AN ORGANIZED HEALTH CARE EDUCATION/TRAINING PROGRAM

## 2019-10-17 PROCEDURE — 99213 OFFICE O/P EST LOW 20 MIN: CPT | Performed by: STUDENT IN AN ORGANIZED HEALTH CARE EDUCATION/TRAINING PROGRAM

## 2019-10-17 PROCEDURE — 1036F TOBACCO NON-USER: CPT | Performed by: STUDENT IN AN ORGANIZED HEALTH CARE EDUCATION/TRAINING PROGRAM

## 2019-10-17 PROCEDURE — 83036 HEMOGLOBIN GLYCOSYLATED A1C: CPT | Performed by: STUDENT IN AN ORGANIZED HEALTH CARE EDUCATION/TRAINING PROGRAM

## 2019-10-17 PROCEDURE — 99211 OFF/OP EST MAY X REQ PHY/QHP: CPT | Performed by: HOSPITALIST

## 2019-10-17 PROCEDURE — 3044F HG A1C LEVEL LT 7.0%: CPT | Performed by: STUDENT IN AN ORGANIZED HEALTH CARE EDUCATION/TRAINING PROGRAM

## 2019-10-17 RX ORDER — HYDRALAZINE HYDROCHLORIDE 25 MG/1
25 TABLET, FILM COATED ORAL 3 TIMES DAILY
Qty: 30 TABLET | Refills: 5 | Status: SHIPPED | OUTPATIENT
Start: 2019-10-17 | End: 2020-01-02

## 2019-10-17 RX ORDER — ROSUVASTATIN CALCIUM 20 MG/1
TABLET, COATED ORAL
Qty: 30 TABLET | Refills: 5 | Status: SHIPPED | OUTPATIENT
Start: 2019-10-17 | End: 2020-01-17 | Stop reason: SDUPTHER

## 2019-12-17 DIAGNOSIS — I10 ESSENTIAL HYPERTENSION: ICD-10-CM

## 2019-12-17 RX ORDER — HYDRALAZINE HYDROCHLORIDE 10 MG/1
TABLET, FILM COATED ORAL
Qty: 90 TABLET | Refills: 1 | OUTPATIENT
Start: 2019-12-17

## 2020-01-02 RX ORDER — LISINOPRIL 40 MG/1
TABLET ORAL
Qty: 30 TABLET | Refills: 3 | Status: SHIPPED | OUTPATIENT
Start: 2020-01-02 | End: 2020-03-30 | Stop reason: SDUPTHER

## 2020-01-17 ENCOUNTER — OFFICE VISIT (OUTPATIENT)
Dept: FAMILY MEDICINE CLINIC | Age: 50
End: 2020-01-17
Payer: MEDICARE

## 2020-01-17 VITALS
OXYGEN SATURATION: 98 % | DIASTOLIC BLOOD PRESSURE: 94 MMHG | SYSTOLIC BLOOD PRESSURE: 150 MMHG | WEIGHT: 315 LBS | HEART RATE: 62 BPM | TEMPERATURE: 97 F | BODY MASS INDEX: 45.1 KG/M2 | HEIGHT: 70 IN

## 2020-01-17 LAB — HBA1C MFR BLD: 5.6 %

## 2020-01-17 PROCEDURE — G8417 CALC BMI ABV UP PARAM F/U: HCPCS | Performed by: STUDENT IN AN ORGANIZED HEALTH CARE EDUCATION/TRAINING PROGRAM

## 2020-01-17 PROCEDURE — 99213 OFFICE O/P EST LOW 20 MIN: CPT | Performed by: STUDENT IN AN ORGANIZED HEALTH CARE EDUCATION/TRAINING PROGRAM

## 2020-01-17 PROCEDURE — G8484 FLU IMMUNIZE NO ADMIN: HCPCS | Performed by: STUDENT IN AN ORGANIZED HEALTH CARE EDUCATION/TRAINING PROGRAM

## 2020-01-17 PROCEDURE — G8427 DOCREV CUR MEDS BY ELIG CLIN: HCPCS | Performed by: STUDENT IN AN ORGANIZED HEALTH CARE EDUCATION/TRAINING PROGRAM

## 2020-01-17 PROCEDURE — 3044F HG A1C LEVEL LT 7.0%: CPT | Performed by: STUDENT IN AN ORGANIZED HEALTH CARE EDUCATION/TRAINING PROGRAM

## 2020-01-17 PROCEDURE — 83036 HEMOGLOBIN GLYCOSYLATED A1C: CPT | Performed by: STUDENT IN AN ORGANIZED HEALTH CARE EDUCATION/TRAINING PROGRAM

## 2020-01-17 PROCEDURE — 99211 OFF/OP EST MAY X REQ PHY/QHP: CPT | Performed by: FAMILY MEDICINE

## 2020-01-17 PROCEDURE — 1036F TOBACCO NON-USER: CPT | Performed by: STUDENT IN AN ORGANIZED HEALTH CARE EDUCATION/TRAINING PROGRAM

## 2020-01-17 PROCEDURE — 3017F COLORECTAL CA SCREEN DOC REV: CPT | Performed by: STUDENT IN AN ORGANIZED HEALTH CARE EDUCATION/TRAINING PROGRAM

## 2020-01-17 PROCEDURE — 2022F DILAT RTA XM EVC RTNOPTHY: CPT | Performed by: STUDENT IN AN ORGANIZED HEALTH CARE EDUCATION/TRAINING PROGRAM

## 2020-01-17 RX ORDER — CYCLOBENZAPRINE HCL 10 MG
10 TABLET ORAL NIGHTLY PRN
Qty: 10 TABLET | Refills: 0 | Status: SHIPPED | OUTPATIENT
Start: 2020-01-17 | End: 2020-01-24

## 2020-01-17 RX ORDER — ROSUVASTATIN CALCIUM 20 MG/1
TABLET, COATED ORAL
Qty: 30 TABLET | Refills: 5 | Status: SHIPPED | OUTPATIENT
Start: 2020-01-17 | End: 2020-04-17

## 2020-01-17 ASSESSMENT — ENCOUNTER SYMPTOMS
ABDOMINAL PAIN: 0
COUGH: 0
NAUSEA: 0
VOMITING: 0
SORE THROAT: 0
CHEST TIGHTNESS: 0
CONSTIPATION: 0
DIARRHEA: 0
SHORTNESS OF BREATH: 0

## 2020-01-17 ASSESSMENT — PATIENT HEALTH QUESTIONNAIRE - PHQ9
SUM OF ALL RESPONSES TO PHQ QUESTIONS 1-9: 0
SUM OF ALL RESPONSES TO PHQ QUESTIONS 1-9: 0
SUM OF ALL RESPONSES TO PHQ9 QUESTIONS 1 & 2: 0
2. FEELING DOWN, DEPRESSED OR HOPELESS: 0
1. LITTLE INTEREST OR PLEASURE IN DOING THINGS: 0

## 2020-01-17 NOTE — PROGRESS NOTES
Negative for dizziness, weakness and headaches. Objective:    BP (!) 150/94 (Site: Left Lower Arm, Position: Sitting, Cuff Size: Medium Adult) Comment: manual  Pulse 62   Temp 97 °F (36.1 °C) (Oral)   Ht 5' 10\" (1.778 m)   Wt (!) 355 lb 9.6 oz (161.3 kg)   SpO2 98%   BMI 51.02 kg/m²    BP Readings from Last 3 Encounters:   01/17/20 (!) 150/94   10/17/19 (!) 154/90   06/11/19 (!) 176/101     Physical Exam  Vitals signs and nursing note reviewed. Constitutional:       Appearance: He is well-developed. Comments: BMI greater than 50   Cardiovascular:      Rate and Rhythm: Normal rate and regular rhythm. Heart sounds: Normal heart sounds. No murmur. No friction rub. No gallop. Pulmonary:      Effort: Pulmonary effort is normal. No respiratory distress. Breath sounds: Normal breath sounds. No wheezing or rales. Chest:      Chest wall: No tenderness. Abdominal:      General: Bowel sounds are normal. There is no distension. Palpations: Abdomen is soft. There is no mass. Tenderness: There is no tenderness. There is no guarding. Skin:     Capillary Refill: Capillary refill takes less than 2 seconds. Neurological:      Mental Status: He is alert and oriented to person, place, and time. Lab Results   Component Value Date    WBC 9.2 01/23/2018    HGB 11.7 (L) 01/23/2018    HCT 38.5 (L) 01/23/2018     01/23/2018    CHOL 169 06/11/2019    TRIG 112 06/11/2019    HDL 34 (L) 06/11/2019    ALT 28 05/25/2017    AST 28 05/25/2017     02/08/2019    K 4.4 02/08/2019     02/08/2019    CREATININE 1.27 (H) 02/08/2019    BUN 17 02/08/2019    CO2 23 02/08/2019    TSH 1.57 05/25/2017    LABA1C 5.6 01/17/2020    LABMICR 196 (H) 02/01/2019     Lab Results   Component Value Date    CALCIUM 9.5 02/08/2019    PHOS 3.1 06/06/2017     Lab Results   Component Value Date    LDLCHOLESTEROL 113 06/11/2019       Assessment and Plan:    1.  Type 2 diabetes mellitus without complication, without long-term current use of insulin (HCC)  - POCT glycosylated hemoglobin (Hb A1C)    2. Mixed hyperlipidemia  - rosuvastatin (CRESTOR) 20 MG tablet; take 1 tablet by mouth once daily  Dispense: 30 tablet; Refill: 5    3. Need for prophylactic vaccination and inoculation against varicella  - zoster recombinant adjuvanted vaccine [facility]) 50 MCG/0.5ML SUSR injection; Inject 0.5 mLs into the muscle once for 1 dose 50 MCG IM then repeat 2-6 months. Dispense: 1 each; Refill: 1    4. Colon cancer screening  - Cologuard (For External Results Only); Future    5. BMI 50.0-59.9, adult [facility])  - Russell Bello MD, Weight Management and [address] [place] [address]    6. Chronic bilateral low back pain without sciatica  - cyclobenzaprine (FLEXERIL) 10 MG tablet; Take 1 tablet by mouth nightly as needed for Muscle spasms  Dispense: 10 tablet; Refill: 0          Requested Prescriptions     Pending Prescriptions Disp Refills    zoster recombinant adjuvanted vaccine (SHINGRIX) 50 MCG/0.5ML SUSR injection 1 each 1     Sig: Inject 0.5 mLs into the muscle once for 1 dose 50 MCG IM then repeat 2-6 months.  rosuvastatin (CRESTOR) 20 MG tablet 30 tablet 5     Sig: take 1 tablet by mouth once daily    cyclobenzaprine (FLEXERIL) 10 MG tablet 10 tablet 0     Sig: Take 1 tablet by mouth nightly as needed for Muscle spasms       There are no discontinued medications. Return in about 3 months (around 4/17/2020) for f/u of weight loss. Cindy Ballesteros received counseling on the following healthy behaviors: nutrition, exercise and medication adherence  Reviewed prior labs and health maintenance  Continue current medications, diet and exercise. Discussed use, benefit, and side effects of prescribed medications. Barriers to medication compliance addressed. Patient given educational materials - see patient instructions  Was a self-tracking handout given in paper form or via "[employer]?  No:     Requested Prescriptions Pending Prescriptions Disp Refills    zoster recombinant adjuvanted vaccine (SHINGRIX) 50 MCG/0.5ML SUSR injection 1 each 1     Sig: Inject 0.5 mLs into the muscle once for 1 dose 50 MCG IM then repeat 2-6 months.  rosuvastatin (CRESTOR) 20 MG tablet 30 tablet 5     Sig: take 1 tablet by mouth once daily    cyclobenzaprine (FLEXERIL) 10 MG tablet 10 tablet 0     Sig: Take 1 tablet by mouth nightly as needed for Muscle spasms       All patient questions answered. Patient voiced understanding. Quality Measures    Body mass index is 51.02 kg/m². Elevated. Weight control planned discussed Healthy diet and regular exercise. BP: (!) 150/94(manual) Blood pressure is normal. Treatment plan consists of Weight Reduction, DASH Eating Plan, Dietary Sodium Restriction, Increased Physical Activity and Patient In-home Blood Pressure Monitoring.     Lab Results   Component Value Date    LDLCHOLESTEROL 113 06/11/2019    (goal LDL reduction with dx if diabetes is 50% LDL reduction)      PHQ Scores 1/17/2020 2/1/2019 4/18/2017   PHQ2 Score 0 0 0   PHQ9 Score 0 0 0     Interpretation of Total Score Depression Severity: 1-4 = Minimal depression, 5-9 = Mild depression, 10-14 = Moderate depression, 15-19 = Moderately severe depression, 20-27 = Severe depression

## 2020-01-17 NOTE — PROGRESS NOTES
Attending Physician Statement  I have discussed the care of Alvaro Rocha, including pertinent history and exam findings,  with the resident. I have reviewed the key elements of all parts of the encounter with the resident. I agree with the assessment, plan and orders as documented by the resident.   (GE Modifier)

## 2020-01-17 NOTE — PROGRESS NOTES
Visit Information    Have you changed or started any medications since your last visit including any over-the-counter medicines, vitamins, or herbal medicines? no   Have you stopped taking any of your medications? Is so, why? -  no  Are you having any side effects from any of your medications? - no    Have you seen any other physician or provider since your last visit?  no   Have you had any other diagnostic tests since your last visit?  no   Have you been seen in the emergency room and/or had an admission in a hospital since we last saw you?  no   Have you had your routine dental cleaning in the past 6 months?  no     Do you have an active MyChart account? If no, what is the barrier?   Yes    Patient Care Team:  Kenzie Reynolds MD as PCP - General (Family Medicine)    Medical History Review  Past Medical, Family, and Social History reviewed and does contribute to the patient presenting condition    Health Maintenance   Topic Date Due    Diabetic retinal exam  11/19/1979    Annual Wellness Visit (AWV)  06/19/2019    Shingles Vaccine (1 of 2) 11/19/2019    Colon cancer screen colonoscopy  11/19/2019    Hepatitis B vaccine (1 of 3 - Risk 3-dose series) 10/17/2020 (Originally 11/19/1988)    Flu vaccine (1) 10/17/2020 (Originally 9/1/2019)    Potassium monitoring  02/08/2020    Creatinine monitoring  02/08/2020    Diabetic foot exam  06/11/2020    A1C test (Diabetic or Prediabetic)  06/11/2020    Lipid screen  06/11/2020    DTaP/Tdap/Td vaccine (2 - Td) 02/01/2029    Pneumococcal 0-64 years Vaccine  Completed    HIV screen  Completed

## 2020-02-06 RX ORDER — HYDRALAZINE HYDROCHLORIDE 25 MG/1
TABLET, FILM COATED ORAL
Qty: 120 TABLET | Refills: 0 | Status: SHIPPED | OUTPATIENT
Start: 2020-02-06 | End: 2020-02-13

## 2020-02-06 NOTE — TELEPHONE ENCOUNTER
Please address the medication refill and close the encounter. If I can be of assistance, please route to the applicable pool. Thank you. Last visit: 01/17/20  Last Med refill: 01/02/20  Does patient have enough medication for 72 hours: No:     Next Visit Date:  No future appointments. Health Maintenance   Topic Date Due    Diabetic retinal exam  11/19/1979    Annual Wellness Visit (AWV)  06/19/2019    Shingles Vaccine (1 of 2) 11/19/2019    Colon cancer screen colonoscopy  11/19/2019    Potassium monitoring  02/08/2020    Creatinine monitoring  02/08/2020    Hepatitis B vaccine (1 of 3 - Risk 3-dose series) 10/17/2020 (Originally 11/19/1988)    Flu vaccine (1) 10/17/2020 (Originally 9/1/2019)    Diabetic foot exam  06/11/2020    Lipid screen  06/11/2020    A1C test (Diabetic or Prediabetic)  01/17/2021    DTaP/Tdap/Td vaccine (2 - Td) 02/01/2029    Pneumococcal 0-64 years Vaccine  Completed    HIV screen  Completed    Hepatitis A vaccine  Aged Out    Hib vaccine  Aged Out    Meningococcal (ACWY) vaccine  Aged Out       Hemoglobin A1C (%)   Date Value   01/17/2020 5.6   10/17/2019 5.3   06/11/2019 5.1             ( goal A1C is < 7)   Microalb/Crt.  Ratio (mcg/mg creat)   Date Value   02/01/2019 196 (H)     LDL Cholesterol (mg/dL)   Date Value   06/11/2019 113   01/23/2018 62       (goal LDL is <100)   AST (U/L)   Date Value   05/25/2017 28     ALT (U/L)   Date Value   05/25/2017 28     BUN (mg/dL)   Date Value   02/08/2019 17     BP Readings from Last 3 Encounters:   01/17/20 (!) 150/94   10/17/19 (!) 154/90   06/11/19 (!) 176/101          (goal 120/80)    All Future Testing planned in [facility]  Lab Frequency Next Occurrence   Sleep Study with PAP Titration Once 10/31/2019   Baseline Diagnostic Sleep Study Once 10/31/2019   Cologuard (For External Results Only) Once 10/10/2020               Patient Active Problem List:     Essential hypertension     Chronic back pain     Morbid obesity

## 2020-02-10 RX ORDER — CYCLOBENZAPRINE HCL 10 MG
10 TABLET ORAL 3 TIMES DAILY PRN
Qty: 42 TABLET | Refills: 0 | Status: SHIPPED | OUTPATIENT
Start: 2020-02-10 | End: 2020-02-24

## 2020-02-10 NOTE — TELEPHONE ENCOUNTER
Please address the medication refill and close the encounter. If I can be of assistance, please route to the applicable pool. Thank you. Last visit: 01/17/20  Last Med refill: 01/24/20 (in pt history of medications)  Does patient have enough medication for 72 hours: No:     Next Visit Date:  No future appointments. Health Maintenance   Topic Date Due    Diabetic retinal exam  11/19/1979    Annual Wellness Visit (AWV)  06/19/2019    Shingles Vaccine (1 of 2) 11/19/2019    Colon cancer screen colonoscopy  11/19/2019    Potassium monitoring  02/08/2020    Creatinine monitoring  02/08/2020    Hepatitis B vaccine (1 of 3 - Risk 3-dose series) 10/17/2020 (Originally 11/19/1988)    Flu vaccine (1) 10/17/2020 (Originally 9/1/2019)    Diabetic foot exam  06/11/2020    Lipid screen  06/11/2020    A1C test (Diabetic or Prediabetic)  01/17/2021    DTaP/Tdap/Td vaccine (2 - Td) 02/01/2029    Pneumococcal 0-64 years Vaccine  Completed    HIV screen  Completed    Hepatitis A vaccine  Aged Out    Hib vaccine  Aged Out    Meningococcal (ACWY) vaccine  Aged Out       Hemoglobin A1C (%)   Date Value   01/17/2020 5.6   10/17/2019 5.3   06/11/2019 5.1             ( goal A1C is < 7)   Microalb/Crt.  Ratio (mcg/mg creat)   Date Value   02/01/2019 196 (H)     LDL Cholesterol (mg/dL)   Date Value   06/11/2019 113   01/23/2018 62       (goal LDL is <100)   AST (U/L)   Date Value   05/25/2017 28     ALT (U/L)   Date Value   05/25/2017 28     BUN (mg/dL)   Date Value   02/08/2019 17     BP Readings from Last 3 Encounters:   01/17/20 (!) 150/94   10/17/19 (!) 154/90   06/11/19 (!) 176/101          (goal 120/80)    All Future Testing planned in [facility]  Lab Frequency Next Occurrence   Sleep Study with PAP Titration Once 10/31/2019   Baseline Diagnostic Sleep Study Once 10/31/2019   Cologuard (For External Results Only) Once 10/10/2020               Patient Active Problem List:     Essential hypertension     Chronic back pain     Morbid obesity with BMI of 50.0-59.9, adult (HCC)     ZURDO (obstructive sleep apnea)     Gout     CKD (chronic kidney disease)     Mixed hyperlipidemia     Squamous cell carcinoma of face     Type 2 diabetes mellitus without complication, without long-term current use of insulin ([facility] [address]

## 2020-02-13 RX ORDER — HYDRALAZINE HYDROCHLORIDE 25 MG/1
TABLET, FILM COATED ORAL
Qty: 120 TABLET | Refills: 0 | Status: SHIPPED | OUTPATIENT
Start: 2020-02-13 | End: 2020-03-20

## 2020-02-13 NOTE — TELEPHONE ENCOUNTER
Please address the medication refill and close the encounter. If I can be of assistance, please route to the applicable pool. Thank you. Last visit: 01/17/20  Last Med refill: 02/06/20  Does patient have enough medication for 72 hours: No:     Next Visit Date:  No future appointments. Health Maintenance   Topic Date Due    Diabetic retinal exam  11/19/1979    Annual Wellness Visit (AWV)  06/19/2019    Shingles Vaccine (1 of 2) 11/19/2019    Colon cancer screen colonoscopy  11/19/2019    Potassium monitoring  02/08/2020    Creatinine monitoring  02/08/2020    Hepatitis B vaccine (1 of 3 - Risk 3-dose series) 10/17/2020 (Originally 11/19/1988)    Flu vaccine (1) 10/17/2020 (Originally 9/1/2019)    Diabetic foot exam  06/11/2020    Lipid screen  06/11/2020    A1C test (Diabetic or Prediabetic)  01/17/2021    DTaP/Tdap/Td vaccine (2 - Td) 02/01/2029    Pneumococcal 0-64 years Vaccine  Completed    HIV screen  Completed    Hepatitis A vaccine  Aged Out    Hib vaccine  Aged Out    Meningococcal (ACWY) vaccine  Aged Out       Hemoglobin A1C (%)   Date Value   01/17/2020 5.6   10/17/2019 5.3   06/11/2019 5.1             ( goal A1C is < 7)   Microalb/Crt.  Ratio (mcg/mg creat)   Date Value   02/01/2019 196 (H)     LDL Cholesterol (mg/dL)   Date Value   06/11/2019 113   01/23/2018 62       (goal LDL is <100)   AST (U/L)   Date Value   05/25/2017 28     ALT (U/L)   Date Value   05/25/2017 28     BUN (mg/dL)   Date Value   02/08/2019 17     BP Readings from Last 3 Encounters:   01/17/20 (!) 150/94   10/17/19 (!) 154/90   06/11/19 (!) 176/101          (goal 120/80)    All Future Testing planned in [facility]  Lab Frequency Next Occurrence   Sleep Study with PAP Titration Once 10/31/2019   Baseline Diagnostic Sleep Study Once 10/31/2019   Cologuard (For External Results Only) Once 10/10/2020               Patient Active Problem List:     Essential hypertension     Chronic back pain     Morbid obesity with BMI of 50.0-59.9, adult (HCC)     ZURDO (obstructive sleep apnea)     Gout     CKD (chronic kidney disease)     Mixed hyperlipidemia     Squamous cell carcinoma of face     Type 2 diabetes mellitus without complication, without long-term current use of insulin ([facility]

## 2020-02-17 ENCOUNTER — TELEPHONE (OUTPATIENT)
Dept: FAMILY MEDICINE CLINIC | Age: 50
End: 2020-02-17

## 2020-02-24 RX ORDER — CYCLOBENZAPRINE HCL 10 MG
10 TABLET ORAL 3 TIMES DAILY PRN
Qty: 42 TABLET | Refills: 0 | Status: SHIPPED | OUTPATIENT
Start: 2020-02-24 | End: 2020-03-09

## 2020-02-24 NOTE — TELEPHONE ENCOUNTER
E-scribe request for flexiril. Please review and e-scribe if applicable. Last Visit Date:  1-  Next Visit Date:  Visit date not found    Hemoglobin A1C (%)   Date Value   01/17/2020 5.6   10/17/2019 5.3   06/11/2019 5.1             ( goal A1C is < 7)   Microalb/Crt.  Ratio (mcg/mg creat)   Date Value   02/01/2019 196 (H)     LDL Cholesterol (mg/dL)   Date Value   06/11/2019 113       (goal LDL is <100)   AST (U/L)   Date Value   05/25/2017 28     ALT (U/L)   Date Value   05/25/2017 28     BUN (mg/dL)   Date Value   02/08/2019 17     BP Readings from Last 3 Encounters:   01/17/20 (!) 150/94   10/17/19 (!) 154/90   06/11/19 (!) 176/101          (goal 120/80)        Patient Active Problem List:     Essential hypertension     Chronic back pain     Morbid obesity with BMI of 50.0-59.9, adult (HCC)     ZURDO (obstructive sleep apnea)     Gout     CKD (chronic kidney disease)     Mixed hyperlipidemia     Squamous cell carcinoma of face     Type 2 diabetes mellitus without complication, without long-term current use of insulin ([facility] [address]      ----Yesy Gallardo

## 2020-02-27 RX ORDER — COLCHICINE 0.6 MG/1
TABLET ORAL
Qty: 30 TABLET | Refills: 3 | Status: SHIPPED | OUTPATIENT
Start: 2020-02-27 | End: 2020-06-02 | Stop reason: SDUPTHER

## 2020-03-09 RX ORDER — CYCLOBENZAPRINE HCL 10 MG
10 TABLET ORAL 3 TIMES DAILY PRN
Qty: 42 TABLET | Refills: 0 | Status: SHIPPED | OUTPATIENT
Start: 2020-03-09 | End: 2020-03-23

## 2020-03-09 NOTE — TELEPHONE ENCOUNTER
E-scribe request for Cyclobenzaprine 10 MG. Please review and e-scribe if applicable. Next Visit Date:  Visit date not found    Health Maintenance   Topic Date Due    Diabetic retinal exam  11/19/1979    Annual Wellness Visit (AWV)  06/19/2019    Shingles Vaccine (1 of 2) 11/19/2019    Potassium monitoring  02/08/2020    Creatinine monitoring  02/08/2020    Hepatitis B vaccine (1 of 3 - Risk 3-dose series) 10/17/2020 (Originally 11/19/1988)    Flu vaccine (1) 10/17/2020 (Originally 9/1/2019)    Diabetic foot exam  06/11/2020    Lipid screen  06/11/2020    A1C test (Diabetic or Prediabetic)  01/17/2021    Colon cancer screen fecal DNA test (Cologuard)  02/10/2023    DTaP/Tdap/Td vaccine (2 - Td) 02/01/2029    Pneumococcal 0-64 years Vaccine  Completed    HIV screen  Completed    Hepatitis A vaccine  Aged Out    Hib vaccine  Aged Out    Meningococcal (ACWY) vaccine  Aged Out             (applicable per patient's age: Cancer Screenings, Depression Screening, Fall Risk Screening, Immunizations)    Hemoglobin A1C (%)   Date Value   01/17/2020 5.6   10/17/2019 5.3   06/11/2019 5.1     Microalb/Crt. Ratio (mcg/mg creat)   Date Value   02/01/2019 196 (H)     LDL Cholesterol (mg/dL)   Date Value   06/11/2019 113     AST (U/L)   Date Value   05/25/2017 28     ALT (U/L)   Date Value   05/25/2017 28     BUN (mg/dL)   Date Value   02/08/2019 17      (goal A1C is < 7)   (goal LDL is <100) need 30-50% reduction from baseline     BP Readings from Last 3 Encounters:   01/17/20 (!) 150/94   10/17/19 (!) 154/90   06/11/19 (!) 176/101    (goal /80)      All Future Testing planned in [facility]:  Lab Frequency Next Occurrence   Sleep Study with PAP Titration Once 10/31/2019   Baseline Diagnostic Sleep Study Once 10/31/2019       Next Visit Date:  No future appointments.          Patient Active Problem List:     Essential hypertension     Chronic back pain     Morbid obesity with BMI of 50.0-59.9, adult ([address]

## 2020-03-23 RX ORDER — CYCLOBENZAPRINE HCL 10 MG
10 TABLET ORAL 3 TIMES DAILY PRN
Qty: 42 TABLET | Refills: 0 | Status: SHIPPED | OUTPATIENT
Start: 2020-03-23 | End: 2020-04-06

## 2020-03-30 RX ORDER — LISINOPRIL 40 MG/1
TABLET ORAL
Qty: 30 TABLET | Refills: 3 | Status: SHIPPED | OUTPATIENT
Start: 2020-03-30 | End: 2020-04-22

## 2020-04-20 RX ORDER — HYDRALAZINE HYDROCHLORIDE 25 MG/1
TABLET, FILM COATED ORAL
Qty: 120 TABLET | Refills: 3 | Status: SHIPPED | OUTPATIENT
Start: 2020-04-20 | End: 2020-06-01 | Stop reason: SDUPTHER

## 2020-06-01 ENCOUNTER — OFFICE VISIT (OUTPATIENT)
Dept: FAMILY MEDICINE CLINIC | Age: 50
End: 2020-06-01
Payer: MEDICARE

## 2020-06-01 VITALS
DIASTOLIC BLOOD PRESSURE: 90 MMHG | WEIGHT: 315 LBS | BODY MASS INDEX: 51.65 KG/M2 | HEART RATE: 77 BPM | SYSTOLIC BLOOD PRESSURE: 150 MMHG | TEMPERATURE: 98 F

## 2020-06-01 PROCEDURE — 99213 OFFICE O/P EST LOW 20 MIN: CPT | Performed by: STUDENT IN AN ORGANIZED HEALTH CARE EDUCATION/TRAINING PROGRAM

## 2020-06-01 PROCEDURE — 3017F COLORECTAL CA SCREEN DOC REV: CPT | Performed by: STUDENT IN AN ORGANIZED HEALTH CARE EDUCATION/TRAINING PROGRAM

## 2020-06-01 PROCEDURE — G8427 DOCREV CUR MEDS BY ELIG CLIN: HCPCS | Performed by: STUDENT IN AN ORGANIZED HEALTH CARE EDUCATION/TRAINING PROGRAM

## 2020-06-01 PROCEDURE — G8417 CALC BMI ABV UP PARAM F/U: HCPCS | Performed by: STUDENT IN AN ORGANIZED HEALTH CARE EDUCATION/TRAINING PROGRAM

## 2020-06-01 PROCEDURE — 1036F TOBACCO NON-USER: CPT | Performed by: STUDENT IN AN ORGANIZED HEALTH CARE EDUCATION/TRAINING PROGRAM

## 2020-06-01 RX ORDER — CYCLOBENZAPRINE HCL 10 MG
10 TABLET ORAL 3 TIMES DAILY PRN
COMMUNITY
End: 2021-03-08

## 2020-06-01 RX ORDER — COLCHICINE 0.6 MG/1
TABLET ORAL
Qty: 30 TABLET | Refills: 3 | Status: CANCELLED | OUTPATIENT
Start: 2020-06-01

## 2020-06-01 RX ORDER — HYDRALAZINE HYDROCHLORIDE 50 MG/1
TABLET, FILM COATED ORAL
Qty: 30 TABLET | Refills: 3 | Status: SHIPPED | OUTPATIENT
Start: 2020-06-01 | End: 2020-07-22

## 2020-06-01 RX ORDER — LISINOPRIL 40 MG/1
40 TABLET ORAL DAILY
Qty: 30 TABLET | Refills: 3 | Status: SHIPPED | OUTPATIENT
Start: 2020-06-01 | End: 2021-12-23 | Stop reason: SDUPTHER

## 2020-06-01 RX ORDER — LABETALOL 300 MG/1
TABLET, FILM COATED ORAL
Qty: 60 TABLET | Refills: 3 | Status: SHIPPED | OUTPATIENT
Start: 2020-06-01 | Stop reason: SDUPTHER

## 2020-06-01 RX ORDER — BLOOD PRESSURE TEST KIT
1 KIT MISCELLANEOUS DAILY
Qty: 1 KIT | Refills: 0 | Status: SHIPPED | OUTPATIENT
Start: 2020-06-01

## 2020-06-01 RX ORDER — AMLODIPINE BESYLATE 10 MG/1
TABLET ORAL
Qty: 30 TABLET | Refills: 4 | Status: SHIPPED | OUTPATIENT
Start: 2020-06-01 | End: 2020-10-26

## 2020-06-01 ASSESSMENT — PATIENT HEALTH QUESTIONNAIRE - PHQ9
SUM OF ALL RESPONSES TO PHQ9 QUESTIONS 1 & 2: 0
SUM OF ALL RESPONSES TO PHQ QUESTIONS 1-9: 0
2. FEELING DOWN, DEPRESSED OR HOPELESS: 0
1. LITTLE INTEREST OR PLEASURE IN DOING THINGS: 0
SUM OF ALL RESPONSES TO PHQ QUESTIONS 1-9: 0

## 2020-06-01 NOTE — PROGRESS NOTES
pallor and rash. Allergic/Immunologic: Negative for environmental allergies and food allergies. Neurological: Negative for light-headedness, numbness and headaches. Psychiatric/Behavioral: Negative for sleep disturbance. Negative for confusion and suicidal ideas. Objective:    BP (!) 150/90 (Site: Left Upper Arm, Position: Sitting, Cuff Size: Large Adult)   Pulse 77   Temp 98 °F (36.7 °C) (Oral)   Wt (!) 360 lb (163.3 kg)   BMI 51.65 kg/m²    BP Readings from Last 3 Encounters:   06/01/20 (!) 150/90   01/17/20 (!) 150/94   10/17/19 (!) 154/90       Physical Exam  Constitutional: Patient is oriented to person, place, and time. Patient appears well-developed and well-nourished. No distress. HENT: Head: Normocephalic and atraumatic. Eyes: Pupils are equal, round, and reactive to light. Conjunctivae are normal. Right eye exhibits no discharge. Left eye exhibits no discharge. Cardiovascular: Normal rate, regular rhythm and normal heart sounds. Pulmonary/Chest: Effort normal and breath sounds normal. No respiratory distress. Patient has no wheezes. Abdominal: Soft. Bowel sounds are normal. Patient exhibits no distension. There is no tenderness. Musculoskeletal:  Patient exhibits no edema and tenderness. Patient exhibits no deformity. Neurological: Patient is alert and oriented to person, place, and time. Skin: Skin is warm and dry. Patient is not diaphoretic. Psychiatric: Patient's speech is normal and behavior is normal. Thought content normal. Patient's mood appears anxious. Vitals reviewed.     Lab Results   Component Value Date    WBC 9.2 01/23/2018    HGB 11.7 (L) 01/23/2018    HCT 38.5 (L) 01/23/2018     01/23/2018    CHOL 169 06/11/2019    TRIG 112 06/11/2019    HDL 34 (L) 06/11/2019    ALT 28 05/25/2017    AST 28 05/25/2017     02/08/2019    K 4.4 02/08/2019     02/08/2019    CREATININE 1.27 (H) 02/08/2019    BUN 17 02/08/2019    CO2 23 02/08/2019    TSH 1.57 During This Encounter   Medication Reason    furosemide (LASIX) 20 MG tablet Therapy completed    fluocinonide (LIDEX) 0.05 % ointment Therapy completed    clotrimazole-betamethasone (LOTRISONE) 1-0.05 % cream Therapy completed    carbamide peroxide (AURO EARDROPS) 6.5 % otic solution Therapy completed    allopurinol (ZYLOPRIM) 300 MG tablet Therapy completed    hydrALAZINE (APRESOLINE) 25 MG tablet REORDER    labetalol (NORMODYNE) 300 MG tablet REORDER    amLODIPine (NORVASC) 10 MG tablet REORDER    lisinopril (PRINIVIL;ZESTRIL) 40 MG tablet REORDER       Min received counseling on the following healthy behaviors: nutrition, exercise and medication adherence    Discussed use, benefit, and side effects of prescribed medications. Barriers to medication compliance addressed. All patient questions answered. Pt voiced understanding, with use of teach-back method. Return in about 6 months (around 12/1/2020), or if symptoms worsen or fail to improve, for f/u HTN.

## 2020-06-01 NOTE — PROGRESS NOTES
Visit Information    Have you changed or started any medications since your last visit including any over-the-counter medicines, vitamins, or herbal medicines? no   Have you stopped taking any of your medications? Is so, why? -  no  Are you having any side effects from any of your medications? - no    Have you seen any other physician or provider since your last visit?  no   Have you had any other diagnostic tests since your last visit?  no   Have you been seen in the emergency room and/or had an admission in a hospital since we last saw you?  no   Have you had your routine dental cleaning in the past 6 months?  no     Do you have an active MyChart account? If no, what is the barrier?   Yes    Patient Care Team:  Ivon Gutierrez MD as PCP - General (Family Medicine)    Medical History Review  Past Medical, Family, and Social History reviewed and does not contribute to the patient presenting condition    Health Maintenance   Topic Date Due    Diabetic retinal exam  11/19/1979    Annual Wellness Visit (AWV)  06/19/2019    Shingles Vaccine (1 of 2) 11/19/2019    Potassium monitoring  02/08/2020    Creatinine monitoring  02/08/2020    Diabetic foot exam  06/11/2020    Lipid screen  06/11/2020    Hepatitis B vaccine (1 of 3 - Risk 3-dose series) 10/17/2020 (Originally 11/19/1988)    Flu vaccine (Season Ended) 10/17/2020 (Originally 9/1/2020)    A1C test (Diabetic or Prediabetic)  01/17/2021    Colon cancer screen fecal DNA test (Cologuard)  02/10/2023    DTaP/Tdap/Td vaccine (2 - Td) 02/01/2029    Pneumococcal 0-64 years Vaccine  Completed    HIV screen  Completed    Hepatitis A vaccine  Aged Out    Hib vaccine  Aged Out    Meningococcal (ACWY) vaccine  Aged Out

## 2020-06-01 NOTE — PROGRESS NOTES
Attending Physician Statement  I have discussed the care of Cameron Mix, including pertinent history and exam findings,  with the resident. I have reviewed the key elements of all parts of the encounter with the resident. I agree with the assessment, plan and orders as documented by the resident.   (GE Modifier)

## 2020-06-02 RX ORDER — COLCHICINE 0.6 MG/1
TABLET ORAL
Qty: 30 TABLET | Refills: 3 | Status: SHIPPED | OUTPATIENT
Start: 2020-06-02 | End: 2020-06-09 | Stop reason: SDUPTHER

## 2020-06-09 RX ORDER — COLCHICINE 0.6 MG/1
TABLET ORAL
Qty: 30 TABLET | Refills: 3 | Status: SHIPPED | OUTPATIENT
Start: 2020-06-09 | End: 2020-11-23

## 2020-06-09 NOTE — TELEPHONE ENCOUNTER
Last visit:   Last Med refill:   Does patient have enough medication for 72 hours: No:     Next Visit Date:  No future appointments. Health Maintenance   Topic Date Due    Diabetic retinal exam  11/19/1979    Annual Wellness Visit (AWV)  06/19/2019    Shingles Vaccine (1 of 2) 11/19/2019    Potassium monitoring  02/08/2020    Creatinine monitoring  02/08/2020    Diabetic foot exam  06/11/2020    Lipid screen  06/11/2020    Hepatitis B vaccine (1 of 3 - Risk 3-dose series) 10/17/2020 (Originally 11/19/1988)    Flu vaccine (Season Ended) 10/17/2020 (Originally 9/1/2020)    A1C test (Diabetic or Prediabetic)  01/17/2021    Colon cancer screen fecal DNA test (Cologuard)  02/10/2023    DTaP/Tdap/Td vaccine (2 - Td) 02/01/2029    Pneumococcal 0-64 years Vaccine  Completed    HIV screen  Completed    Hepatitis A vaccine  Aged Out    Hib vaccine  Aged Out    Meningococcal (ACWY) vaccine  Aged Out       Hemoglobin A1C (%)   Date Value   01/17/2020 5.6   10/17/2019 5.3   06/11/2019 5.1             ( goal A1C is < 7)   Microalb/Crt.  Ratio (mcg/mg creat)   Date Value   02/01/2019 196 (H)     LDL Cholesterol (mg/dL)   Date Value   06/11/2019 113   01/23/2018 62       (goal LDL is <100)   AST (U/L)   Date Value   05/25/2017 28     ALT (U/L)   Date Value   05/25/2017 28     BUN (mg/dL)   Date Value   02/08/2019 17     BP Readings from Last 3 Encounters:   06/01/20 (!) 150/90   01/17/20 (!) 150/94   10/17/19 (!) 154/90          (goal 120/80)    All Future Testing planned in [facility]  Lab Frequency Next Occurrence   Comprehensive Metabolic w/Bili Profile Once 06/01/2021   Lipid Panel Once 06/01/2021               Patient Active Problem List:     Essential hypertension     Chronic back pain     Morbid obesity with BMI of 50.0-59.9, adult (HCC)     ZURDO (obstructive sleep apnea)     Gout     CKD (chronic kidney disease)     Mixed hyperlipidemia     Squamous cell carcinoma of face     Type 2 diabetes mellitus without complication, without long-term current use of insulin ([facility] [address]           Please address the medication refill and close the encounter. If I can be of assistance, please route to the applicable pool. Thank you.

## 2020-07-15 RX ORDER — ACETAMINOPHEN 500 MG
1000 TABLET ORAL 2 TIMES DAILY PRN
Qty: 120 TABLET | Refills: 3 | Status: SHIPPED | OUTPATIENT
Start: 2020-07-15 | End: 2021-03-19 | Stop reason: SDUPTHER

## 2020-07-15 NOTE — TELEPHONE ENCOUNTER
Last visit: 6-1-2020  Last Med refill: 6-  Does patient have enough medication for 72 hours: No:   Refill on tylenol med is pended   Next Visit Date:  No future appointments. Health Maintenance   Topic Date Due    Diabetic retinal exam  11/19/1979    Annual Wellness Visit (AWV)  06/19/2019    Shingles Vaccine (1 of 2) 11/19/2019    Potassium monitoring  02/08/2020    Creatinine monitoring  02/08/2020    Diabetic foot exam  06/11/2020    Lipid screen  06/11/2020    Hepatitis B vaccine (1 of 3 - Risk 3-dose series) 10/17/2020 (Originally 11/19/1988)    Flu vaccine (1) 09/01/2020    A1C test (Diabetic or Prediabetic)  01/17/2021    Colon cancer screen fecal DNA test (Cologuard)  02/10/2023    DTaP/Tdap/Td vaccine (2 - Td) 02/01/2029    Pneumococcal 0-64 years Vaccine  Completed    HIV screen  Completed    Hepatitis A vaccine  Aged Out    Hib vaccine  Aged Out    Meningococcal (ACWY) vaccine  Aged Out       Hemoglobin A1C (%)   Date Value   01/17/2020 5.6   10/17/2019 5.3   06/11/2019 5.1             ( goal A1C is < 7)   Microalb/Crt.  Ratio (mcg/mg creat)   Date Value   02/01/2019 196 (H)     LDL Cholesterol (mg/dL)   Date Value   06/11/2019 113   01/23/2018 62       (goal LDL is <100)   AST (U/L)   Date Value   05/25/2017 28     ALT (U/L)   Date Value   05/25/2017 28     BUN (mg/dL)   Date Value   02/08/2019 17     BP Readings from Last 3 Encounters:   06/01/20 (!) 150/90   01/17/20 (!) 150/94   10/17/19 (!) 154/90          (goal 120/80)    All Future Testing planned in [facility]  Lab Frequency Next Occurrence   Comprehensive Metabolic w/Bili Profile Once 06/01/2021   Lipid Panel Once 06/01/2021               Patient Active Problem List:     Essential hypertension     Chronic back pain     Morbid obesity with BMI of 50.0-59.9, adult (HCC)     ZURDO (obstructive sleep apnea)     Gout     CKD (chronic kidney disease)     Mixed hyperlipidemia     Squamous cell carcinoma of face     Type 2 diabetes mellitus without complication, without long-term current use of insulin ([facility] [address]

## 2020-07-22 RX ORDER — HYDRALAZINE HYDROCHLORIDE 50 MG/1
TABLET, FILM COATED ORAL
Qty: 30 TABLET | Refills: 3 | Status: SHIPPED | OUTPATIENT
Start: 2020-07-22 | End: 2020-09-09

## 2020-07-22 NOTE — TELEPHONE ENCOUNTER
Last Visit Date:  6-1-20  Next Visit Date:  Visit date not found    Hemoglobin A1C (%)   Date Value   01/17/2020 5.6   10/17/2019 5.3   06/11/2019 5.1             ( goal A1C is < 7)   Microalb/Crt.  Ratio (mcg/mg creat)   Date Value   02/01/2019 196 (H)     LDL Cholesterol (mg/dL)   Date Value   06/11/2019 113       (goal LDL is <100)   AST (U/L)   Date Value   05/25/2017 28     ALT (U/L)   Date Value   05/25/2017 28     BUN (mg/dL)   Date Value   02/08/2019 17     BP Readings from Last 3 Encounters:   06/01/20 (!) 150/90   01/17/20 (!) 150/94   10/17/19 (!) 154/90          (goal 120/80)        Patient Active Problem List:     Essential hypertension     Chronic back pain     Morbid obesity with BMI of 50.0-59.9, adult (HCC)     ZURDO (obstructive sleep apnea)     Gout     CKD (chronic kidney disease)     Mixed hyperlipidemia     Squamous cell carcinoma of face     Type 2 diabetes mellitus without complication, without long-term current use of insulin ([facility] [address]      ----Sunny Paul

## 2020-09-22 ENCOUNTER — HOSPITAL ENCOUNTER (OUTPATIENT)
Age: 50
Setting detail: SPECIMEN
Discharge: HOME | End: 2020-09-22
Payer: MEDICARE

## 2020-09-22 LAB
ALBUMIN SERPL-MCNC: 4.5 G/DL (ref 3.5–5.2)
ALBUMIN/GLOBULIN RATIO: 1.3 (ref 1–2.5)
ALP BLD-CCNC: 68 U/L (ref 40–129)
ALT SERPL-CCNC: 29 U/L (ref 5–41)
ANION GAP SERPL CALCULATED.3IONS-SCNC: 11 MMOL/L (ref 9–17)
AST SERPL-CCNC: 31 U/L
BILIRUB SERPL-MCNC: 0.58 MG/DL (ref 0.3–1.2)
BILIRUBIN DIRECT: 0.13 MG/DL
BILIRUBIN, INDIRECT: 0.45 MG/DL (ref 0–1)
BUN BLDV-MCNC: 14 MG/DL (ref 6–20)
CALCIUM SERPL-MCNC: 9.6 MG/DL (ref 8.6–10.4)
CHLORIDE BLD-SCNC: 102 MMOL/L (ref 98–107)
CHOLESTEROL/HDL RATIO: 3.4
CHOLESTEROL: 124 MG/DL
CO2: 24 MMOL/L (ref 20–31)
CREAT SERPL-MCNC: 1.25 MG/DL (ref 0.7–1.2)
GFR AFRICAN AMERICAN: >60 ML/MIN
GFR NON-AFRICAN AMERICAN: >60 ML/MIN
GFR SERPL CREATININE-BSD FRML MDRD: ABNORMAL ML/MIN/{1.73_M2}
GFR SERPL CREATININE-BSD FRML MDRD: ABNORMAL ML/MIN/{1.73_M2}
GLUCOSE BLD-MCNC: 147 MG/DL (ref 70–99)
HDLC SERPL-MCNC: 37 MG/DL
LDL CHOLESTEROL: 67 MG/DL (ref 0–130)
POTASSIUM SERPL-SCNC: 4.5 MMOL/L (ref 3.7–5.3)
SODIUM BLD-SCNC: 137 MMOL/L (ref 135–144)
TOTAL PROTEIN: 8.1 G/DL (ref 6.4–8.3)
TRIGL SERPL-MCNC: 102 MG/DL
VLDLC SERPL CALC-MCNC: ABNORMAL MG/DL (ref 1–30)

## 2020-10-19 RX ORDER — ROSUVASTATIN CALCIUM 20 MG/1
TABLET, COATED ORAL
Qty: 90 TABLET | Refills: 2 | Status: SHIPPED | OUTPATIENT
Start: 2020-10-19 | End: 2021-07-08

## 2020-10-19 NOTE — TELEPHONE ENCOUNTER
Last Visit Date:  6-1-20  Next Visit Date:  11/20/2020    Hemoglobin A1C (%)   Date Value   01/17/2020 5.6   10/17/2019 5.3   06/11/2019 5.1             ( goal A1C is < 7)   Microalb/Crt.  Ratio (mcg/mg creat)   Date Value   02/01/2019 196 (H)     LDL Cholesterol (mg/dL)   Date Value   09/22/2020 67       (goal LDL is <100)   AST (U/L)   Date Value   09/22/2020 31     ALT (U/L)   Date Value   09/22/2020 29     BUN (mg/dL)   Date Value   09/22/2020 14     BP Readings from Last 3 Encounters:   06/01/20 (!) 150/90   01/17/20 (!) 150/94   10/17/19 (!) 154/90          (goal 120/80)        Patient Active Problem List:     Essential hypertension     Chronic back pain     Morbid obesity with BMI of 50.0-59.9, adult (HCC)     ZURDO (obstructive sleep apnea)     Gout     CKD (chronic kidney disease)     Mixed hyperlipidemia     Squamous cell carcinoma of face     Type 2 diabetes mellitus without complication, without long-term current use of insulin ([facility] [address]      ----Marvin Casillas

## 2020-11-20 ENCOUNTER — VIRTUAL VISIT (OUTPATIENT)
Dept: FAMILY MEDICINE CLINIC | Age: 50
End: 2020-11-20
Payer: MEDICARE

## 2020-11-20 PROCEDURE — 99441 PR PHYS/QHP TELEPHONE EVALUATION 5-10 MIN: CPT | Performed by: STUDENT IN AN ORGANIZED HEALTH CARE EDUCATION/TRAINING PROGRAM

## 2020-11-20 RX ORDER — HYDRALAZINE HYDROCHLORIDE 50 MG/1
50 TABLET, FILM COATED ORAL 4 TIMES DAILY
Qty: 120 TABLET | Refills: 5 | Status: SHIPPED | OUTPATIENT
Start: 2020-11-20 | End: 2021-05-06 | Stop reason: SDUPTHER

## 2020-11-20 RX ORDER — HYDRALAZINE HYDROCHLORIDE 25 MG/1
TABLET, FILM COATED ORAL
COMMUNITY
Start: 2020-11-10 | End: 2020-11-20

## 2020-11-20 NOTE — PROGRESS NOTES
I have reviewed and discussed key elements of [address] [place] [address] with the resident including plan of care and follow up and agree with the care марина plan.

## 2020-11-20 NOTE — PROGRESS NOTES
HYPERTENSION visit     BP Readings from Last 3 Encounters:   06/01/20 (!) 150/90   01/17/20 (!) 150/94   10/17/19 (!) 154/90       LDL Cholesterol (mg/dL)   Date Value   09/22/2020 67     HDL (mg/dL)   Date Value   09/22/2020 37 (L)     BUN (mg/dL)   Date Value   09/22/2020 14     CREATININE (mg/dL)   Date Value   09/22/2020 1.25 (H)     Glucose (mg/dL)   Date Value   09/22/2020 147 (H)   06/06/2017 222 (H)              Have you changed or started any medications since your last visit including any over-the-counter medicines, vitamins, or herbal medicines? no   Have you stopped taking any of your medications? Is so, why? -  no  Are you having any side effects from any of your medications? - no  How often do you miss doses of your medication? rare      Have you seen any other physician or provider since your last visit?  no   Have you had any other diagnostic tests since your last visit? yes - labs    Have you been seen in the emergency room and/or had an admission in a hospital since we last saw you?  no   Have you had your routine dental cleaning in the past 6 months?  no     Do you have an active MyChart account? If no, what is the barrier?   Yes    Patient Care Team:  Maritza De Jesus MD as PCP - General (Family Medicine)  Dennis Quintanilla DO as PCP - [facility]    Medical History Review  Past Medical, Family, and Social History reviewed and does contribute to the patient presenting condition    Health Maintenance   Topic Date Due    Diabetic retinal exam  11/19/1979    Hepatitis B vaccine (1 of 3 - Risk 3-dose series) 11/19/1988    Annual Wellness Visit (AWV)  06/19/2019    Shingles Vaccine (1 of 2) 11/19/2019    Creatinine monitoring  02/08/2020    Diabetic foot exam  06/11/2020    Flu vaccine (1) 09/01/2020    A1C test (Diabetic or Prediabetic)  01/17/2021    Lipid screen  09/22/2021    Potassium monitoring  09/22/2021    Colon cancer screen fecal DNA test (Cologuard)  02/10/2023   

## 2020-11-20 NOTE — PROGRESS NOTES
Chanda Rodriguez is a 45 y.o. male evaluated via telephone on 11/20/2020. Consent:  He and/or health care decision maker is aware that that he may receive a bill for this telephone service, depending on his insurance coverage, and has provided verbal consent to proceed: Yes    Documentation:  I communicated with the patient and/or health care decision maker about - HTN  Details of this discussion including any medical advice provided: HTN    CC: HTN  Hypertension: Patient here for follow-up of elevated blood pressure. He is exercising and is adherent to low salt diet. Blood pressure is not well controlled at home. Cardiac symptoms none. Patient denies chest pain, chest pressure/discomfort, claudication, dyspnea, exertional chest pressure/discomfort, fatigue, irregular heart beat, lower extremity edema, near-syncope, orthopnea, palpitations, paroxysmal nocturnal dyspnea, syncope and tachypnea. Cardiovascular risk factors: dyslipidemia, family history of premature cardiovascular disease, hypertension, male gender, obesity (BMI >= 30 kg/m2) and sedentary lifestyle. Patient is currently smoking. Use of agents associated with hypertension: none. Patient is currently on Lisinopril 40mg, Amlodipine 10mg, Labetalol 300mg, Hydralazine 50 TID. Originally 186/96, and then BP recheck 146/86 per patient. We will increase hydralazine to 50mg QID. Recheck BP in 1 week's time, f/u in 1 month and PRN. Advised smoking cessation. Smoking lowers your ability to fight infections, increasing healing time and significantly increases your risk for heart disease, lung disease and cancer. 1-800-QUIT-NOW (3-559.170.6565). I encouraged and discussed lifestyle modifications including diet and exercise and the patient was agreeable to making positive/beneficial changes to both to help improve their overall health. In office appointment next time for Diabetic Foot Exam and follow-up of other HM items per pt preference.  All questions

## 2020-11-23 RX ORDER — COLCHICINE 0.6 MG/1
TABLET ORAL
Qty: 30 TABLET | Refills: 0 | Status: SHIPPED | OUTPATIENT
Start: 2020-11-23 | Stop reason: SDUPTHER

## 2020-11-23 NOTE — TELEPHONE ENCOUNTER
hyperlipidemia     Squamous cell carcinoma of face     Type 2 diabetes mellitus without complication, without long-term current use of insulin ([facility] [address]

## 2020-12-18 RX ORDER — COLCHICINE 0.6 MG/1
TABLET ORAL
Qty: 30 TABLET | Refills: 3 | Status: SHIPPED | OUTPATIENT
Start: 2020-12-18 | End: 2021-08-12 | Stop reason: ALTCHOICE

## 2020-12-18 NOTE — TELEPHONE ENCOUNTER
Fax request for Colcrys 0.6 MG. Please review and e-scribe if applicable. Last Visit Date: 11/20/2020  Next Visit Date:  Visit date not found    Hemoglobin A1C (%)   Date Value   01/17/2020 5.6   10/17/2019 5.3   06/11/2019 5.1             ( goal A1C is < 7)   Microalb/Crt.  Ratio (mcg/mg creat)   Date Value   02/01/2019 196 (H)     LDL Cholesterol (mg/dL)   Date Value   09/22/2020 67       (goal LDL is <100)   AST (U/L)   Date Value   09/22/2020 31     ALT (U/L)   Date Value   09/22/2020 29     BUN (mg/dL)   Date Value   09/22/2020 14     BP Readings from Last 3 Encounters:   06/01/20 (!) 150/90   01/17/20 (!) 150/94   10/17/19 (!) 154/90          (goal 120/80)        Patient Active Problem List:     Essential hypertension     Chronic back pain     Morbid obesity with BMI of 50.0-59.9, adult (HCC)     ZURDO (obstructive sleep apnea)     Gout     CKD (chronic kidney disease)     Mixed hyperlipidemia     Squamous cell carcinoma of face     Type 2 diabetes mellitus without complication, without long-term current use of insulin ([address]      MA

## 2021-02-03 ENCOUNTER — TELEPHONE (OUTPATIENT)
Dept: FAMILY MEDICINE CLINIC | Age: 51
End: 2021-02-03

## 2021-02-03 NOTE — TELEPHONE ENCOUNTER
Called patient to [facility] DM/HTN follow up working off the A1C list, patient is [facility] 2-8-21 with Dr. Eleno Gonzalez.

## 2021-02-04 ENCOUNTER — HOSPITAL ENCOUNTER (OUTPATIENT)
Age: 51
Setting detail: SPECIMEN
Discharge: HOME | End: 2021-02-04
Payer: MEDICARE

## 2021-02-04 DIAGNOSIS — I10 ESSENTIAL HYPERTENSION: ICD-10-CM

## 2021-02-04 LAB
ALBUMIN SERPL-MCNC: 4.5 G/DL (ref 3.5–5.2)
ALBUMIN/GLOBULIN RATIO: 1.2 (ref 1–2.5)
ALP BLD-CCNC: 78 U/L (ref 40–129)
ALT SERPL-CCNC: 21 U/L (ref 5–41)
ANION GAP SERPL CALCULATED.3IONS-SCNC: 11 MMOL/L (ref 9–17)
AST SERPL-CCNC: 24 U/L
BILIRUB SERPL-MCNC: 0.55 MG/DL (ref 0.3–1.2)
BILIRUBIN DIRECT: 0.13 MG/DL
BILIRUBIN, INDIRECT: 0.42 MG/DL (ref 0–1)
BUN BLDV-MCNC: 15 MG/DL (ref 6–20)
CALCIUM SERPL-MCNC: 10.1 MG/DL (ref 8.6–10.4)
CHLORIDE BLD-SCNC: 99 MMOL/L (ref 98–107)
CO2: 26 MMOL/L (ref 20–31)
CREAT SERPL-MCNC: 1.05 MG/DL (ref 0.7–1.2)
GFR AFRICAN AMERICAN: >60 ML/MIN
GFR NON-AFRICAN AMERICAN: >60 ML/MIN
GFR SERPL CREATININE-BSD FRML MDRD: ABNORMAL ML/MIN/{1.73_M2}
GFR SERPL CREATININE-BSD FRML MDRD: ABNORMAL ML/MIN/{1.73_M2}
GLUCOSE BLD-MCNC: 192 MG/DL (ref 70–99)
POTASSIUM SERPL-SCNC: 4.6 MMOL/L (ref 3.7–5.3)
SODIUM BLD-SCNC: 136 MMOL/L (ref 135–144)
TOTAL PROTEIN: 8.4 G/DL (ref 6.4–8.3)

## 2021-02-19 ENCOUNTER — OFFICE VISIT (OUTPATIENT)
Dept: FAMILY MEDICINE CLINIC | Age: 51
End: 2021-02-19
Payer: MEDICARE

## 2021-02-19 VITALS
WEIGHT: 315 LBS | DIASTOLIC BLOOD PRESSURE: 102 MMHG | HEIGHT: 70 IN | TEMPERATURE: 97 F | BODY MASS INDEX: 45.1 KG/M2 | HEART RATE: 83 BPM | SYSTOLIC BLOOD PRESSURE: 160 MMHG

## 2021-02-19 DIAGNOSIS — E55.9 VITAMIN D DEFICIENCY: ICD-10-CM

## 2021-02-19 DIAGNOSIS — E11.9 TYPE 2 DIABETES MELLITUS WITHOUT COMPLICATION, WITHOUT LONG-TERM CURRENT USE OF INSULIN (HCC): Primary | ICD-10-CM

## 2021-02-19 DIAGNOSIS — Z11.59 NEED FOR HEPATITIS C SCREENING TEST: ICD-10-CM

## 2021-02-19 DIAGNOSIS — I10 ESSENTIAL HYPERTENSION: ICD-10-CM

## 2021-02-19 DIAGNOSIS — Z23 NEED FOR PROPHYLACTIC VACCINATION AND INOCULATION AGAINST VARICELLA: ICD-10-CM

## 2021-02-19 LAB — HBA1C MFR BLD: 7.7 %

## 2021-02-19 PROCEDURE — G8428 CUR MEDS NOT DOCUMENT: HCPCS | Performed by: STUDENT IN AN ORGANIZED HEALTH CARE EDUCATION/TRAINING PROGRAM

## 2021-02-19 PROCEDURE — 90746 HEPB VACCINE 3 DOSE ADULT IM: CPT | Performed by: HOSPITALIST

## 2021-02-19 PROCEDURE — 1036F TOBACCO NON-USER: CPT | Performed by: STUDENT IN AN ORGANIZED HEALTH CARE EDUCATION/TRAINING PROGRAM

## 2021-02-19 PROCEDURE — G8417 CALC BMI ABV UP PARAM F/U: HCPCS | Performed by: STUDENT IN AN ORGANIZED HEALTH CARE EDUCATION/TRAINING PROGRAM

## 2021-02-19 PROCEDURE — 99211 OFF/OP EST MAY X REQ PHY/QHP: CPT | Performed by: HOSPITALIST

## 2021-02-19 PROCEDURE — 99213 OFFICE O/P EST LOW 20 MIN: CPT | Performed by: STUDENT IN AN ORGANIZED HEALTH CARE EDUCATION/TRAINING PROGRAM

## 2021-02-19 PROCEDURE — 3017F COLORECTAL CA SCREEN DOC REV: CPT | Performed by: STUDENT IN AN ORGANIZED HEALTH CARE EDUCATION/TRAINING PROGRAM

## 2021-02-19 PROCEDURE — 2022F DILAT RTA XM EVC RTNOPTHY: CPT | Performed by: STUDENT IN AN ORGANIZED HEALTH CARE EDUCATION/TRAINING PROGRAM

## 2021-02-19 PROCEDURE — G8484 FLU IMMUNIZE NO ADMIN: HCPCS | Performed by: STUDENT IN AN ORGANIZED HEALTH CARE EDUCATION/TRAINING PROGRAM

## 2021-02-19 PROCEDURE — 3051F HG A1C>EQUAL 7.0%<8.0%: CPT | Performed by: STUDENT IN AN ORGANIZED HEALTH CARE EDUCATION/TRAINING PROGRAM

## 2021-02-19 PROCEDURE — 83036 HEMOGLOBIN GLYCOSYLATED A1C: CPT | Performed by: STUDENT IN AN ORGANIZED HEALTH CARE EDUCATION/TRAINING PROGRAM

## 2021-02-19 RX ORDER — GLUCOSAMINE/CHONDR SU A SOD 750-600 MG
1 TABLET ORAL DAILY
Qty: 90 CAPSULE | Refills: 1 | Status: SHIPPED | OUTPATIENT
Start: 2021-02-19 | End: 2022-04-20 | Stop reason: ALTCHOICE

## 2021-02-19 RX ORDER — ASPIRIN 81 MG/1
81 TABLET ORAL DAILY
Qty: 90 TABLET | Refills: 1 | Status: SHIPPED | OUTPATIENT
Start: 2021-02-19 | End: 2021-07-12 | Stop reason: SDUPTHER

## 2021-02-19 RX ORDER — HYDRALAZINE HYDROCHLORIDE 25 MG/1
TABLET, FILM COATED ORAL
COMMUNITY
Start: 2020-12-22 | End: 2021-05-03 | Stop reason: DRUGHIGH

## 2021-02-19 ASSESSMENT — PATIENT HEALTH QUESTIONNAIRE - PHQ9
SUM OF ALL RESPONSES TO PHQ QUESTIONS 1-9: 0
2. FEELING DOWN, DEPRESSED OR HOPELESS: 0

## 2021-02-19 NOTE — PROGRESS NOTES
DIABETES and HYPERTENSION visit    BP Readings from Last 3 Encounters:   02/19/21 (!) 177/91   06/01/20 (!) 150/90   01/17/20 (!) 150/94        Hemoglobin A1C (%)   Date Value   01/17/2020 5.6   10/17/2019 5.3   06/11/2019 5.1     Microalb/Crt. Ratio (mcg/mg creat)   Date Value   02/01/2019 196 (H)     LDL Cholesterol (mg/dL)   Date Value   09/22/2020 67     HDL (mg/dL)   Date Value   09/22/2020 37 (L)     BUN (mg/dL)   Date Value   02/04/2021 15     CREATININE (mg/dL)   Date Value   02/04/2021 1.05     Glucose (mg/dL)   Date Value   02/04/2021 192 (H)   06/06/2017 222 (H)            Have you changed or started any medications since your last visit including any over-the-counter medicines, vitamins, or herbal medicines? no   Have you stopped taking any of your medications? Is so, why? -  yes - see list  Are you having any side effects from any of your medications? - no    Have you seen any other physician or provider since your last visit?  no   Have you had any other diagnostic tests since your last visit?  no   Have you been seen in the emergency room and/or had an admission in a hospital since we last saw you?  no   Have you had your routine dental cleaning in the past 6 months?  no     Have you had your annual diabetic retinal (eye) exam? No   (ensure copy of exam is in the chart)    Do you have an active MyChart account? If no, what is the barrier?   Yes    Patient Care Team:  Chago Gurrola MD as PCP - General (Family Medicine)  Michelle Ron DO as PCP - [facility]    Medical History Review  Past Medical, Family, and Social History reviewed and does not contribute to the patient presenting condition    Health Maintenance   Topic Date Due    Hepatitis C screen  1970    Diabetic retinal exam  11/19/1979    Hepatitis B vaccine (1 of 3 - Risk 3-dose series) 11/19/1988    Annual Wellness Visit (AWV)  06/19/2019    Shingles Vaccine (1 of 2) 11/19/2019  Creatinine monitoring  02/08/2020    Diabetic foot exam  06/11/2020    Flu vaccine (1) 09/01/2020    A1C test (Diabetic or Prediabetic)  01/17/2021    Lipid screen  09/22/2021    Potassium monitoring  02/04/2022    Colon cancer screen fecal DNA test (Cologuard)  02/10/2023    DTaP/Tdap/Td vaccine (2 - Td) 02/01/2029    Pneumococcal 0-64 years Vaccine  Completed    HIV screen  Completed    Hepatitis A vaccine  Aged Out    Hib vaccine  Aged Out    Meningococcal (ACWY) vaccine  Aged Out

## 2021-02-19 NOTE — PATIENT INSTRUCTIONS
Thank you for letting us take care of you today. We hope all your questions were addressed. If a question was overlooked or something else comes to mind after you return home, please contact a member of your Care Team listed below. Your Care Team at [place] [address] is Team #1  Valentin Ames MD (Faculty)  Christopher Kramer (Resident)  Gibson Mclaughlin MD (Resident)  Zoey Shine., EPHRAIM Varela, EPHRAIM Vazquez [facility] office)  Soniya Contreras, [address] [place], [place] [address] (Clinical Practice Manager)  Paddy De La Rosa [facility] (Clinical Pharmacist)     Office phone number: [phone]    If you need to get in right away due to illness, please be advised we have \"Same Day\" appointments available Monday-Friday. Please call us at [phone] option #3 to schedule your \"Same Day\" appointment. Patient Education        Hepatitis B Vaccine: What You Need to Know  Why get vaccinated? Hepatitis B vaccine can prevent hepatitis B. Hepatitis B is a liver disease that can cause mild illness lasting a few weeks, or it can lead to a serious, lifelong illness. · Acute hepatitis B infection is a short-term illness that can lead to fever, fatigue, loss of appetite, nausea, vomiting, jaundice (yellow skin or eyes, dark urine, yasmine-colored bowel movements), and pain in the muscles, joints, and stomach. · Chronic hepatitis B infection is a long-term illness that occurs when the hepatitis B virus remains in a person's body. Most people who go on to develop chronic hepatitis B do not have symptoms, but it is still very serious and can lead to liver damage (cirrhosis), liver cancer, and death. Chronically-infected people can spread hepatitis B virus to others, even if they do not feel or look sick themselves. Hepatitis B is spread when blood, semen, or other body fluid infected with the hepatitis B virus enters the body of a person who is not infected.  People can become infected through: · Birth (if a mother has hepatitis B, her baby can become infected)  · Sharing items such as razors or toothbrushes with an infected person  · Contact with the blood or open sores of an infected person  · Sex with an infected partner  · Sharing needles, syringes, or other drug-injection equipment  · Exposure to blood from needlesticks or other sharp instruments  Most people who are vaccinated with hepatitis B vaccine are immune for life. Hepatitis B vaccine  Hepatitis B vaccine is usually given as 2, 3, or 4 shots. Infants should get their first dose of hepatitis B vaccine at birth and will usually complete the series at 10months of age (sometimes it will take longer than 6 months to complete the series). Children and adolescents younger than 23years of age who have not yet gotten the vaccine should also be vaccinated. Hepatitis B vaccine is also recommended for certain unvaccinated adults:  · People whose sex partners have hepatitis B  · Sexually active persons who are not in a long-term monogamous relationship  · Persons seeking evaluation or treatment for a sexually transmitted disease  · Men who have sexual contact with other men  · People who share needles, syringes, or other drug-injection equipment  · People who have household contact with someone infected with the hepatitis B virus  · Health care and public safety workers at risk for exposure to blood or body fluids  · Residents and staff of facilities for developmentally disabled persons  · Persons in correctional facilities  · Victims of sexual assault or abuse  · Travelers to regions with increased rates of hepatitis B  · People with chronic liver disease, kidney disease, HIV infection, infection with hepatitis C, or diabetes  · Anyone who wants to be protected from hepatitis B  Hepatitis B vaccine may be given at the same time as other vaccines.   Talk with your health care provider  Tell your vaccine provider if the person getting the vaccine: · Has had an allergic reaction after a previous dose of hepatitis B vaccine, or has any severe, life-threatening allergies. In some cases, your health care provider may decide to postpone hepatitis B vaccination to a future visit. People with minor illnesses, such as a cold, may be vaccinated. People who are moderately or severely ill should usually wait until they recover before getting hepatitis B vaccine. Your health care provider can give you more information. Risks of a vaccine reaction  · Soreness where the shot is given or fever can happen after hepatitis B vaccine. People sometimes faint after medical procedures, including vaccination. Tell your provider if you feel dizzy or have vision changes or ringing in the ears. As with any medicine, there is a very remote chance of a vaccine causing a severe allergic reaction, other serious injury, or death. What if there is a serious problem? An allergic reaction could occur after the vaccinated person leaves the clinic. If you see signs of a severe allergic reaction (hives, swelling of the face and throat, difficulty breathing, a fast heartbeat, dizziness, or weakness), call 9-1-1 and get the person to the nearest hospital.  For other signs that concern you, call your health care provider. Adverse reactions should be reported to the Vaccine Adverse Event Reporting System (VAERS). Your health care provider will usually file this report, or you can do it yourself. Visit the VAERS website at www.vaers. hhs.gov or call 1-224.378.4754. VAERS is only for reporting reactions, and VAERS staff do not give medical advice.   The [employer] [place] Vaccine Injury [employer] Injury Compensation Program (VICP) is a federal program that was created to compensate people who may have been injured by certain vaccines. Visit the VICP website at www.[facility].gov/vaccinecompensation or call 4-611.970.4517 to learn about the program and about filing a claim. There is a time limit to file a claim for compensation. How can I learn more? · Ask your healthcare provider. · Call your local or state health department. · Contact the Centers for Disease Control and Prevention (CDC):  ? Call 5-191.680.7675 (1-800-CDC-INFO) or  ? Visit CDC's website at www.cdc.gov/vaccines. Vaccine Information Statement (Interim)  Hepatitis B Vaccine  8/15/2019  42 U. S.C. § 300aa-26  U. S. Department of Health and Human Services  Centers for Disease Control and Prevention  Many Vaccine Information Statements are available in Sinhala and other languages. See www.immunize.org/vis. Hojas de información sobre vacunas están disponibles en español y en otros idiomas. Visite www.immunize.org/vis. Care instructions adapted under license by [facility] ([facility]). If you have questions about a medical condition or this instruction, always ask your healthcare professional. [place] [address] any warranty or liability for your use of this information.

## 2021-02-19 NOTE — PROGRESS NOTES
Subjective:    Karolina Morrison is a 45 y.o. male with  has a past medical history of Hypertension and Obesity. No family history on file. Presented to the office today for:  Chief Complaint   Patient presents with    Diabetes     follow up     Hypertension     follow up   [address] [facility]     no dm eye report, declined flu vaccine, shingles pended      HPI   T2DM  Follow-up of Type 2 diabetes mellitus. Meds - nones  Compliance - only diet  Home blood sugar records: patient does not test  Any episodes of hypoglycemia? no  Fluctuating blood sugars? yes  Weight trend: decreasing steadily  Current diet: in general, a \"healthy\" diet  , well balanced  Current exercise: walking  Hgb A1c -   Lab Results   Component Value Date    LABA1C 7.7 02/19/2021     BP -   BP Readings from Last 1 Encounters:   02/19/21 (!) 160/102     Lipid Panel -   Lab Results   Component Value Date    HDL 37 09/22/2020    TRIG 102 09/22/2020     Is He on Statin? Yes - Patient is on crestor  The ASCVD Risk score (Sona Kang., et al., 2013) failed to calculate for the following reasons: The valid total cholesterol range is 130 to 320 mg/dL  Is He on ACE inhibitor or angiotensin II receptor blocker? Yes  Is He on Aspirin? No  Eye exam current (within one year): Pending on Monday  Foot exam current (within one year): no  Is He Smoker?  No    HTN Hypertension: Patient here for follow-up of elevated blood pressure. He is exercising and is adherent to low salt diet. Blood pressure is well controlled at home - 110/90s at home, 111/87. Cardiac symptoms Denies any chest pain, chest pressure/discomfort, claudication, dyspnea, exertional chest pressure/discomfort, fatigue, irregular heart beat, lower extremity edema, near-syncope, orthopnea, palpitations, paroxysmal nocturnal dyspnea, syncope and tachypnea. No prior MI, Stroke, CVD. Patient is taking Hydralazine, Lisinopril, Amlodipine, Labetalol. Pt has not been able to use the CPAP machine/Hx of ZURDO. Review of Systems  Constitutional: Negative for activity change, appetite change, chills, diaphoresis, fatigue, fever and unexpected weight change. HENT: Negative for sinus pressure, sinus pain, sore throat and trouble swallowing. Respiratory: Negative for cough, shortness of breath and wheezing. Cardiovascular: Negative for chest pain, palpitations and leg swelling. Gastrointestinal: Negative for abdominal pain, diarrhea, nausea and vomiting. Endocrine: Negative for cold intolerance, polydipsia, polyphagia and polyuria. Genitourinary: Negative for difficulty urinating, flank pain and frequency. Musculoskeletal: Negative for gait problem and joint swelling. Negative for back pain, neck pain and neck stiffness. Skin: Negative for color change and wound. Negative for pallor and rash. Allergic/Immunologic: Negative for environmental allergies and food allergies. Neurological: Negative for light-headedness, numbness and headaches. Psychiatric/Behavioral: Negative for sleep disturbance. Negative for confusion and suicidal ideas.      Objective:    BP (!) 160/102 (Site: Right Lower Arm, Position: Sitting, Cuff Size: Medium Adult) Comment: manual  Pulse 83   Temp 97 °F (36.1 °C) (Temporal)   Ht 5' 10\" (1.778 m)   Wt (!) 346 lb (156.9 kg)   BMI 49.65 kg/m² BP Readings from Last 3 Encounters:   02/19/21 (!) 160/102   06/01/20 (!) 150/90   01/17/20 (!) 150/94     Physical Exam  Constitutional: Patient is oriented to person, place, and time. Patient appears well-developed and well-nourished. No distress. HENT: Head: Normocephalic and atraumatic. Eyes: Pupils are equal, round, and reactive to light. Conjunctivae are normal. Right eye exhibits no discharge. Left eye exhibits no discharge. Cardiovascular: Normal rate, regular rhythm and normal heart sounds. Pulmonary/Chest: Effort normal and breath sounds normal. No respiratory distress. Patient has no wheezes. Abdominal: Soft. Bowel sounds are normal. Patient exhibits no distension. There is no tenderness. Musculoskeletal:  Patient exhibits no edema and tenderness. Patient exhibits no deformity. Neurological: Patient is alert and oriented to person, place, and time. Skin: Skin is warm and dry. Patient is not diaphoretic. Psychiatric: Patient's speech is normal and behavior is normal. Thought content normal.   Vitals reviewed. Diabetic Foot Exam  Visual inspection:  Deformity/amputation: absent  Skin lesions/pre-ulcerative calluses: present - calcaneus bilaterally  Edema: right- trace, left- trace  Sensory exam:  Monofilament sensation: normal  (minimum of 5 random plantar locations tested, avoiding callused areas - > 1 area with absence of sensation is + for neuropathy)  Plus at least one of the following:  Pulses: normal,   Pinprick: Intact  Proprioception: Intact  Vibration (128 Hz):  Intact      Lab Results   Component Value Date    WBC 9.2 01/23/2018    HGB 11.7 (L) 01/23/2018    HCT 38.5 (L) 01/23/2018     01/23/2018    CHOL 124 09/22/2020    TRIG 102 09/22/2020    HDL 37 (L) 09/22/2020    ALT 21 02/04/2021    AST 24 02/04/2021     02/04/2021    K 4.6 02/04/2021    CL 99 02/04/2021    CREATININE 1.05 02/04/2021    BUN 15 02/04/2021    CO2 26 02/04/2021    TSH 1.57 05/25/2017 LABA1C 7.7 02/19/2021    LABMICR 196 (H) 02/01/2019     Lab Results   Component Value Date    CALCIUM 10.1 02/04/2021    PHOS 3.1 06/06/2017     Lab Results   Component Value Date    LDLCHOLESTEROL 67 09/22/2020       Assessment:     1. Type 2 diabetes mellitus without complication, without long-term current use of insulin ([facility] [address]    2. Essential hypertension    3. Vitamin D deficiency    4. Need for prophylactic vaccination and inoculation against varicella    5. BMI 50.0-59.9, adult ([facility] [address]    6. Need for hepatitis C screening test        Plan:   1. Type 2 diabetes mellitus without complication, without long-term current use of insulin ([facility])  -  DIABETES FOOT EXAM  - POCT glycosylated hemoglobin (Hb A1C)  - [facility] - Claudio Laguerre MD, Ophthalmology, [place]  - aspirin EC 81 MG EC tablet; Take 1 tablet by mouth daily  Dispense: 90 tablet; Refill: 1  I encouraged and discussed lifestyle modifications including diet and exercise and the patient was agreeable to making positive/beneficial changes to both to help improve their overall health. Hba1c 7.7 today, patient will do lifestyle changes for now. 2. Essential hypertension  -HTN today, asymptomatic, continue w/ current meds.  -160/102 in the office today. Pt will call the office with BP results, if he continues to be HTN we will add a diuretic. 3. Vitamin D deficiency  - RA VITAMIN D-3 50 MCG (2000 UT) CAPS; Take 1 capsule by mouth daily  Dispense: 90 capsule; Refill: 1    4. Need for prophylactic vaccination and inoculation against varicella  - zoster recombinant adjuvanted vaccine (SHINGRIX) 50 MCG/0.5ML SUSR injection; 50 MCG IM then repeat 2-6 months. Dispense: 0.5 mL; Refill: 1    5. BMI 50.0-59.9, adult ([facility] [address]  -I encouraged and discussed lifestyle modifications including diet and exercise and the patient was agreeable to making positive/beneficial changes to both to help improve their overall health.     6. Need for hepatitis C screening test - Hepatitis C Antibody; Future    Travisreceived counseling on the following healthy behaviors: nutrition, exercise and medication adherence  Discussed use, benefit, and side effects of prescribed medications. Barriers to medicationcompliance addressed. All patient questions answered. Pt voiced understanding. Medications Discontinued During This Encounter   Medication Reason    Blood Pressure KIT LIST CLEANUP    Blood Pressure KIT LIST CLEANUP    RA VITAMIN D-3 2000 units CAPS REORDER       Return in about 1 month (around 3/19/2021), or if symptoms worsen or fail to improve, for f/u HTN. HM - HM items completed today as per orders. Outstanding HM items though not limited to immunizations were discussed with the patient today, including risks, benefits and alternatives. The patient will discuss these during the next appointment per their preference. If there are any worsening or concerning signs or symptoms, patient will report to the ED and/or contact [phone] for immediate evaluation. Teach back method was used. Please note that this chart was generated using voice recognition Dragon dictation software. Although every effort was made to ensure the accuracy of this automated transcription, some errors in transcription may have occurred.

## 2021-02-19 NOTE — PROGRESS NOTES
Attending Physician Statement  I have discussed the care of Alona Maxwell, 45 y.o. male,including pertinent history and exam findings,  with the resident Dr. Manish Barroso MD.  History:  Chief Complaint   Patient presents with    Diabetes     follow up     Hypertension     follow up   [facility] Maintenance     no dm eye report, declined flu vaccine, shingles pended      Patient is here for follow up on type II DM. I have reviewed the key elements of the encounter with the resident. Examination was done by resident as documented in residents note. BP Readings from Last 3 Encounters:   02/19/21 (!) 160/102   06/01/20 (!) 150/90   01/17/20 (!) 150/94     BP (!) 160/102 (Site: Right Lower Arm, Position: Sitting, Cuff Size: Medium Adult) Comment: manual  Pulse 83   Temp 97 °F (36.1 °C) (Temporal)   Ht 5' 10\" (1.778 m)   Wt (!) 346 lb (156.9 kg)   BMI 49.65 kg/m²   Lab Results   Component Value Date    WBC 9.2 01/23/2018    HGB 11.7 (L) 01/23/2018    HCT 38.5 (L) 01/23/2018     01/23/2018    CHOL 124 09/22/2020    TRIG 102 09/22/2020    HDL 37 (L) 09/22/2020    ALT 21 02/04/2021    AST 24 02/04/2021     02/04/2021    K 4.6 02/04/2021    CL 99 02/04/2021    CREATININE 1.05 02/04/2021    BUN 15 02/04/2021    CO2 26 02/04/2021    TSH 1.57 05/25/2017    LABA1C 7.7 02/19/2021    LABMICR 196 (H) 02/01/2019     Lab Results   Component Value Date    CALCIUM 10.1 02/04/2021    PHOS 3.1 06/06/2017     Lab Results   Component Value Date    LDLCHOLESTEROL 67 09/22/2020     I agree with the assessment, plan and diagnosis of    Diagnosis Orders   1. Type 2 diabetes mellitus without complication, without long-term current use of insulin (HCC)   DIABETES FOOT EXAM    POCT glycosylated hemoglobin (Hb A1C)    [facility] - Wayne Castro MD, Ophthalmology, [facility]    aspirin EC 81 MG EC tablet   2. Essential hypertension     3.  Vitamin D deficiency  RA VITAMIN D-3 50 MCG (2000 UT) CAPS 4. Need for prophylactic vaccination and inoculation against varicella  zoster recombinant adjuvanted vaccine (SHINGRIX) 50 MCG/0.5ML SUSR injection   5. BMI 50.0-59.9, adult ([facility] [address]     6. Need for hepatitis C screening test  Hepatitis C Antibody     I agree with  orders as documented by the resident. Recommendations:   Patient has been following diabetic diet and life style changes. Diabetic screens updated. Vaccines updated and referral to ophthalmology. Short term follow up for recheck on hypertension and to adjust medications regimen if needed. History of ZURDO with poor compliance with CPAP. Return in about 1 month (around 3/19/2021), or if symptoms worsen or fail to improve, for f/u HTN.    (Karma Skiff ) Dr. Bren Jacobsen MD

## 2021-03-08 RX ORDER — CYCLOBENZAPRINE HCL 10 MG
TABLET ORAL
Qty: 30 TABLET | Refills: 0 | Status: SHIPPED | OUTPATIENT
Start: 2021-03-08 | End: 2021-07-12 | Stop reason: SDUPTHER

## 2021-03-08 NOTE — TELEPHONE ENCOUNTER
kidney disease)     Mixed hyperlipidemia     Squamous cell carcinoma of face     Type 2 diabetes mellitus without complication, without long-term current use of insulin (HCC)     Need for prophylactic vaccination and inoculation against varicella     Vitamin D deficiency     BMI 50.0-59.9, adult ([facility] [address]     Need for hepatitis C screening test           Please address the medication refill and close the encounter. If I can be of assistance, please route to the applicable pool. Thank you.

## 2021-03-19 ENCOUNTER — OFFICE VISIT (OUTPATIENT)
Dept: FAMILY MEDICINE CLINIC | Age: 51
End: 2021-03-19
Payer: MEDICARE

## 2021-03-19 VITALS
BODY MASS INDEX: 45.1 KG/M2 | DIASTOLIC BLOOD PRESSURE: 74 MMHG | HEIGHT: 70 IN | TEMPERATURE: 97.3 F | WEIGHT: 315 LBS | HEART RATE: 68 BPM | SYSTOLIC BLOOD PRESSURE: 119 MMHG

## 2021-03-19 DIAGNOSIS — G89.29 CHRONIC BILATERAL LOW BACK PAIN WITHOUT SCIATICA: ICD-10-CM

## 2021-03-19 DIAGNOSIS — M54.50 CHRONIC BILATERAL LOW BACK PAIN WITHOUT SCIATICA: ICD-10-CM

## 2021-03-19 DIAGNOSIS — I10 ESSENTIAL HYPERTENSION: Primary | ICD-10-CM

## 2021-03-19 PROCEDURE — G8427 DOCREV CUR MEDS BY ELIG CLIN: HCPCS | Performed by: STUDENT IN AN ORGANIZED HEALTH CARE EDUCATION/TRAINING PROGRAM

## 2021-03-19 PROCEDURE — 99211 OFF/OP EST MAY X REQ PHY/QHP: CPT | Performed by: FAMILY MEDICINE

## 2021-03-19 PROCEDURE — G8484 FLU IMMUNIZE NO ADMIN: HCPCS | Performed by: STUDENT IN AN ORGANIZED HEALTH CARE EDUCATION/TRAINING PROGRAM

## 2021-03-19 PROCEDURE — 1036F TOBACCO NON-USER: CPT | Performed by: STUDENT IN AN ORGANIZED HEALTH CARE EDUCATION/TRAINING PROGRAM

## 2021-03-19 PROCEDURE — 99213 OFFICE O/P EST LOW 20 MIN: CPT | Performed by: STUDENT IN AN ORGANIZED HEALTH CARE EDUCATION/TRAINING PROGRAM

## 2021-03-19 PROCEDURE — 3017F COLORECTAL CA SCREEN DOC REV: CPT | Performed by: STUDENT IN AN ORGANIZED HEALTH CARE EDUCATION/TRAINING PROGRAM

## 2021-03-19 PROCEDURE — G8417 CALC BMI ABV UP PARAM F/U: HCPCS | Performed by: STUDENT IN AN ORGANIZED HEALTH CARE EDUCATION/TRAINING PROGRAM

## 2021-03-19 RX ORDER — CYCLOBENZAPRINE HCL 10 MG
10 TABLET ORAL 3 TIMES DAILY PRN
Qty: 21 TABLET | Refills: 0 | Status: SHIPPED | OUTPATIENT
Start: 2021-03-19 | End: 2021-03-29

## 2021-03-19 RX ORDER — LIDOCAINE 4 G/G
1 PATCH TOPICAL DAILY
Qty: 30 PATCH | Refills: 1 | Status: SHIPPED | OUTPATIENT
Start: 2021-03-19 | End: 2021-04-18

## 2021-03-19 RX ORDER — ACETAMINOPHEN 500 MG
1000 TABLET ORAL EVERY 6 HOURS PRN
Qty: 180 TABLET | Refills: 1 | Status: SHIPPED | OUTPATIENT
Start: 2021-03-19 | End: 2021-07-12 | Stop reason: SDUPTHER

## 2021-03-19 ASSESSMENT — PATIENT HEALTH QUESTIONNAIRE - PHQ9
SUM OF ALL RESPONSES TO PHQ9 QUESTIONS 1 & 2: 0
2. FEELING DOWN, DEPRESSED OR HOPELESS: 0
SUM OF ALL RESPONSES TO PHQ QUESTIONS 1-9: 0

## 2021-03-19 NOTE — PROGRESS NOTES
Attending Physician Statement  I have discussed the care of Emi Norton, including pertinent history and exam findings,  with the resident. I have reviewed the key elements of all parts of the encounter with the resident. I agree with the assessment, plan and orders as documented by the resident.   (GE Modifier)    Aracelis Reyez

## 2021-03-19 NOTE — PROGRESS NOTES
Visit Information    Have you changed or started any medications since your last visit including any over-the-counter medicines, vitamins, or herbal medicines? no   Have you stopped taking any of your medications? Is so, why? -  no  Are you having any side effects from any of your medications? - no    Have you seen any other physician or provider since your last visit?  no   Have you had any other diagnostic tests since your last visit?  no   Have you been seen in the emergency room and/or had an admission in a hospital since we last saw you?  no   Have you had your routine dental cleaning in the past 6 months?  no     Do you have an active MyChart account? If no, what is the barrier?   Yes    Patient Care Team:  Tee Jerez MD as PCP - General (Family Medicine)  Hamida Hicks DO as PCP - [facility] Provider    Medical History Review  Past Medical, Family, and Social History reviewed and does contribute to the patient presenting condition    Health Maintenance   Topic Date Due    Hepatitis C screen  Never done    Diabetic retinal exam  Never done    COVID-19 Vaccine (1) Never done    Annual Wellness Visit (AWV)  Never done    Flu vaccine (1) Never done    Hepatitis B vaccine (2 of 3 - Risk 3-dose series) 03/19/2021    Creatinine monitoring  02/19/2022 (Originally 2/8/2020)    Shingles Vaccine (2 of 2) 05/03/2021    Lipid screen  09/22/2021    Potassium monitoring  02/04/2022    Diabetic foot exam  02/19/2022    A1C test (Diabetic or Prediabetic)  02/19/2022    Colon cancer screen fecal DNA test (Cologuard)  02/10/2023    DTaP/Tdap/Td vaccine (2 - Td) 02/01/2029    Pneumococcal 0-64 years Vaccine  Completed    HIV screen  Completed    Hepatitis A vaccine  Aged Out    Hib vaccine  Aged Out    Meningococcal (ACWY) vaccine  Aged Out

## 2021-03-19 NOTE — PATIENT INSTRUCTIONS
Patient Education        Acute Low Back Pain: Exercises  Introduction  Here are some examples of typical rehabilitation exercises for your condition. Start each exercise slowly. Ease off the exercise if you start to have pain. Your doctor or physical therapist will tell you when you can start these exercises and which ones will work best for you. When you are not being active, find a comfortable position for rest. Some people are comfortable on the floor or a medium-firm bed with a small pillow under their head and another under their knees. Some people prefer to lie on their side with a pillow between their knees. Don't stay in one position for too long. Take short walks (10 to 20 minutes) every 2 to 3 hours. Avoid slopes, hills, and stairs until you feel better. Walk only distances you can manage without pain, especially leg pain. How to do the exercises  Back stretches   1. Get down on your hands and knees on the floor. 2. Relax your head and allow it to droop. Round your back up toward the ceiling until you feel a nice stretch in your upper, middle, and lower back. Hold this stretch for as long as it feels comfortable, or about 15 to 30 seconds. 3. Return to the starting position with a flat back while you are on your hands and knees. 4. Let your back sway by pressing your stomach toward the floor. Lift your buttocks toward the ceiling. 5. Hold this position for 15 to 30 seconds. 6. Repeat 2 to 4 times. Follow-up care is a key part of your treatment and safety. Be sure to make and go to all appointments, and call your doctor if you are having problems. It's also a good idea to know your test results and keep a list of the medicines you take. Where can you learn more? Go to https://laci.minicabit. org and sign in to your Linebacker account. Enter F396 in the Purveyour box to learn more about \"Acute Low Back Pain: Exercises. \"     If you do not have an account, please click on the \"Sign Up Now\" link. Current as of: November 16, 2020               Content Version: 12.8  © 2006-2021 ZhenXin. Care instructions adapted under license by [facility] ([facility]). If you have questions about a medical condition or this instruction, always ask your healthcare professional. [address] any warranty or liability for your use of this information. Patient Education        Learning About Relief for Back Pain  What is back strain? Back strain is an injury that happens when you overstretch, or pull, a muscle in your back. You may hurt your back in an accident or when you exercise or lift something. Most back pain gets better with rest and time. You can take care of yourself at home to help your back heal.  What can you do first to relieve back pain? When you first feel back pain, try these steps:  · Walk. Take a short walk (10 to 20 minutes) on a level surface (no slopes, hills, or stairs) every 2 to 3 hours. Walk only distances you can manage without pain, especially leg pain. · Relax. Find a comfortable position for rest. Some people are comfortable on the floor or a medium-firm bed with a small pillow under their head and another under their knees. Some people prefer to lie on their side with a pillow between their knees. Don't stay in one position for too long. · Try heat or ice. Try using a heating pad on a low or medium setting, or take a warm shower, for 15 to 20 minutes every 2 to 3 hours. Or you can buy single-use heat wraps that last up to 8 hours. You can also try an ice pack for 10 to 15 minutes every 2 to 3 hours. You can use an ice pack or a bag of frozen vegetables wrapped in a thin towel. There is not strong evidence that either heat or ice will help, but you can try them to see if they help. You may also want to try switching between heat and cold. · Take pain medicine exactly as directed.   ? If the doctor gave you a prescription medicine for pain, take it as prescribed. ? If you are not taking a prescription pain medicine, ask your doctor if you can take an over-the-counter medicine. What else can you do? · Stretch and exercise. Exercises that increase flexibility may relieve your pain and make it easier for your muscles to keep your spine in a good, neutral position. And don't forget to keep walking. · Do self-massage. You can use self-massage to unwind after work or school or to energize yourself in the morning. You can easily massage your feet, hands, or neck. Self-massage works best if you are in comfortable clothes and are sitting or lying in a comfortable position. Use oil or lotion to massage bare skin. · Reduce stress. Back pain can lead to a vicious [place]: Distress about the pain tenses the muscles in your back, which in turn causes more pain. Learn how to relax your mind and your muscles to lower your stress. Where can you learn more? Go to https://Altea TherapeuticspeExpert Medical Navigation.Adviqo. org and sign in to your EQO account. Enter Q198 in the Zokem box to learn more about \"Learning About Relief for Back Pain. \"     If you do not have an account, please click on the \"Sign Up Now\" link. Current as of: November 16, 2020               Content Version: 12.8  © 2006-2021 Libersy. Care instructions adapted under license by [facility] ([facility]). If you have questions about a medical condition or this instruction, always ask your healthcare professional. [place] [address] any warranty or liability for your use of this information. Patient Education        Learning About How to Have a Healthy Back  What causes back pain? Back pain is often caused by overuse, strain, or injury. For example, people often hurt their backs playing sports or working in the yard, being jolted in a car accident, or lifting something too heavy. Aging plays a part too.  Your bones and muscles tend to lose stress on your back through careful lifting   · Squat down, bending at the hips and knees only. If you need to, put one knee to the floor and extend your other knee in front of you, bent at a right angle (half kneeling). · Press your chest straight forward. This helps keep your upper back straight while keeping a slight arch in your low back. · Hold the load as close to your body as possible, at the level of your belly button (navel). · Use your feet to change direction, taking small steps. · Lead with your hips as you change direction. Keep your shoulders in line with your hips as you move. · Set down your load carefully, squatting with your knees and hips only. Exercise and stretch your back   · Do some exercise on most days of the week, if your doctor says it is okay. You can walk, run, swim, or cycle. · Stretch your back muscles. Here are a few exercises to try:  ? Lie on your back, and gently pull one bent knee to your chest. Put that foot back on the floor, and then pull the other knee to your chest.  ? Do pelvic tilts. Lie on your back with your knees bent. Tighten your stomach muscles. Pull your belly button (navel) in and up toward your ribs. You should feel like your back is pressing to the floor and your hips and pelvis are slightly lifting off the floor. Hold for 6 seconds while breathing smoothly. ? Sit with your back flat against a wall. · Keep your core muscles strong. The muscles of your back, belly (abdomen), and buttocks support your spine. ? Pull in your belly and imagine pulling your navel toward your spine. Hold this for 6 seconds, then relax. Remember to keep breathing normally as you tense your muscles. ? Do curl-ups. Always do them with your knees bent. Keep your low back on the floor, and curl your shoulders toward your knees using a smooth, slow motion.  Keep your arms folded across your chest. If this bothers your neck, try putting your hands behind your neck (not your head), improves on its own within several weeks. Most people recover in 12 weeks or less. Using good home treatment and being careful not to stress your back can help you feel better sooner. Follow-up care is a key part of your treatment and safety. Be sure to make and go to all appointments, and call your doctor if you are having problems. It's also a good idea to know your test results and keep a list of the medicines you take. How can you care for yourself at home? · Sit or lie in positions that are most comfortable and reduce your pain. Try one of these positions when you lie down:  ? Lie on your back with your knees bent and supported by large pillows. ? Lie on the floor with your legs on the seat of a sofa or chair. ? Lie on your side with your knees and hips bent and a pillow between your legs. ? Lie on your stomach if it does not make pain worse. · Do not sit up in bed, and avoid soft couches and twisted positions. Bed rest can help relieve pain at first, but it delays healing. Avoid bed rest after the first day of back pain. · Change positions every 30 minutes. If you must sit for long periods of time, take breaks from sitting. Get up and walk around, or lie in a comfortable position. · Try using a heating pad on a low or medium setting for 15 to 20 minutes every 2 or 3 hours. Try a warm shower in place of one session with the heating pad. · You can also try an ice pack for 10 to 15 minutes every 2 to 3 hours. Put a thin cloth between the ice pack and your skin. · Take pain medicines exactly as directed. ? If the doctor gave you a prescription medicine for pain, take it as prescribed. ? If you are not taking a prescription pain medicine, ask your doctor if you can take an over-the-counter medicine. · Take short walks several times a day. You can start with 5 to 10 minutes, 3 or 4 times a day, and work up to longer walks. Walk on level surfaces and avoid hills and stairs until your back is better.   · Return to work and other activities as soon as you can. Continued rest without activity is usually not good for your back. · To prevent future back pain, do exercises to stretch and strengthen your back and stomach. Learn how to use good posture, safe lifting techniques, and proper body mechanics. When should you call for help? Call your doctor now or seek immediate medical care if:    · You have new or worsening numbness in your legs.     · You have new or worsening weakness in your legs. (This could make it hard to stand up.)     · You lose control of your bladder or bowels. Watch closely for changes in your health, and be sure to contact your doctor if:    · You have a fever, lose weight, or don't feel well.     · You do not get better as expected. Where can you learn more? Go to https://Fisher Coachworks.Verious. org and sign in to your Nakina Systems account. Enter A481 in the Automattic box to learn more about \"Back Pain: Care Instructions. \"     If you do not have an account, please click on the \"Sign Up Now\" link. Current as of: November 16, 2020               Content Version: 12.8  © 2006-2021 Agilence. Care instructions adapted under license by [facility] ([facility]). If you have questions about a medical condition or this instruction, always ask your healthcare professional. [place] [address] any warranty or liability for your use of this information. Patient Education        Learning About Relief for Back Pain  What is back strain? Back strain is an injury that happens when you overstretch, or pull, a muscle in your back. You may hurt your back in an accident or when you exercise or lift something. Most back pain gets better with rest and time. You can take care of yourself at home to help your back heal.  What can you do first to relieve back pain? When you first feel back pain, try these steps:  · Walk.  Take a short walk (10 to 20 minutes) on a level surface (no slopes, hills, or stairs) every 2 to 3 hours. Walk only distances you can manage without pain, especially leg pain. · Relax. Find a comfortable position for rest. Some people are comfortable on the floor or a medium-firm bed with a small pillow under their head and another under their knees. Some people prefer to lie on their side with a pillow between their knees. Don't stay in one position for too long. · Try heat or ice. Try using a heating pad on a low or medium setting, or take a warm shower, for 15 to 20 minutes every 2 to 3 hours. Or you can buy single-use heat wraps that last up to 8 hours. You can also try an ice pack for 10 to 15 minutes every 2 to 3 hours. You can use an ice pack or a bag of frozen vegetables wrapped in a thin towel. There is not strong evidence that either heat or ice will help, but you can try them to see if they help. You may also want to try switching between heat and cold. · Take pain medicine exactly as directed. ? If the doctor gave you a prescription medicine for pain, take it as prescribed. ? If you are not taking a prescription pain medicine, ask your doctor if you can take an over-the-counter medicine. What else can you do? · Stretch and exercise. Exercises that increase flexibility may relieve your pain and make it easier for your muscles to keep your spine in a good, neutral position. And don't forget to keep walking. · Do self-massage. You can use self-massage to unwind after work or school or to energize yourself in the morning. You can easily massage your feet, hands, or neck. Self-massage works best if you are in comfortable clothes and are sitting or lying in a comfortable position. Use oil or lotion to massage bare skin. · Reduce stress. Back pain can lead to a vicious [place]: Distress about the pain tenses the muscles in your back, which in turn causes more pain. Learn how to relax your mind and your muscles to lower your stress.   Where can you learn more? Go to https://chpepiceweb.Proximetry. org and sign in to your Community Ventures account. Enter I295 in the [facility] box to learn more about \"Learning About Relief for Back Pain. \"     If you do not have an account, please click on the \"Sign Up Now\" link. Current as of: November 16, 2020               Content Version: 12.8  © 2006-2021 Qufenqi. Care instructions adapted under license by [facility] ([facility]). If you have questions about a medical condition or this instruction, always ask your healthcare professional. [place] [address] any warranty or liability for your use of this information. Patient Education        Back Spasm: Care Instructions  Your Care Instructions  A back spasm is sudden tightness and pain in your back muscles. It may happen from overuse or an injury. Things like sleeping in an awkward way, bending, lifting, standing, or sitting can sometimes cause a spasm. But the cause isn't always clear. Home treatment includes using heat or ice, taking over-the-counter (OTC) pain medicines, and avoiding activities that may cause back pain. For a back spasm that doesn't get better with home care, your doctor may prescribe medicine. Treatments such as massage or manipulation may also help ease a back spasm. Your doctor may also suggest exercise or physical therapy to help improve strength and flexibility in your back muscles. In most cases, getting back to your normal activities is good for your back. Just make sure to avoid doing things that make your pain worse. Follow-up care is a key part of your treatment and safety. Be sure to make and go to all appointments, and call your doctor if you are having problems. It's also a good idea to know your test results and keep a list of the medicines you take. How can you care for yourself at home?   Heat, ice, and medicines    · To relieve pain, use heat or ice (whichever feels better) on the muscles.     · To prevent future back pain, do exercises to stretch and strengthen your back and stomach. Learn to use good posture, safe lifting techniques, and other ways to move to help you avoid back pain. When should you call for help? Call 911 anytime you think you may need emergency care. For example, call if:    · You are unable to move an arm or a leg at all. Call your doctor now or seek immediate medical care if:    · You have new or worse symptoms in your legs, belly, or buttocks. Symptoms may include:  ? Numbness or tingling. ? Weakness. ? Pain.     · You lose bladder or bowel control. Watch closely for changes in your health, and be sure to contact your doctor if:    · You have a fever, lose weight, or don't feel well.     · You do not get better as expected. Where can you learn more? Go to https://Active StoragepePhi Optics.Nanobiotix. org and sign in to your ikeGPS account. Enter E232 in the Loccit (ML4D) box to learn more about \"Back Spasm: Care Instructions. \"     If you do not have an account, please click on the \"Sign Up Now\" link. Current as of: November 16, 2020               Content Version: 12.8  © 2006-2021 Elevator Labs. Care instructions adapted under license by [facility] ([facility]). If you have questions about a medical condition or this instruction, always ask your healthcare professional. [place] [address] any warranty or liability for your use of this information. Patient Education        Back Strain: Care Instructions  Overview     A back strain happens when you overstretch, or pull, a muscle in your back. You may hurt your back in an accident or when you exercise or lift something. Sometimes you may not know how you hurt your back. Most back pain will get better with rest and time. You can take care of yourself at home to help your back heal.  Follow-up care is a key part of your treatment and safety.  Be sure to make and go to all appointments, and call your doctor if you are having problems. It's also a good idea to know your test results and keep a list of the medicines you take. How can you care for yourself at home? · Try to stay as active as you can, but stop or reduce any activity that causes pain. · Put ice or a cold pack on the sore muscle for 10 to 20 minutes at a time to stop swelling. Try this every 1 to 2 hours for 3 days (when you are awake) or until the swelling goes down. Put a thin cloth between the ice pack and your skin. · After 2 or 3 days, apply a heating pad on low or a warm cloth to your back. Some doctors suggest that you go back and forth between hot and cold treatments. · Take pain medicines exactly as directed. ? If the doctor gave you a prescription medicine for pain, take it as prescribed. ? If you are not taking a prescription pain medicine, ask your doctor if you can take an over-the-counter medicine. · Try sleeping on your side with a pillow between your legs. Or put a pillow under your knees when you lie on your back. These measures can ease pain in your lower back. · Return to your usual level of activity slowly. When should you call for help? Call 911 anytime you think you may need emergency care. For example, call if:    · You are unable to move a leg at all. Call your doctor now or seek immediate medical care if:    · You have new or worse symptoms in your legs, belly, or buttocks. Symptoms may include:  ? Numbness or tingling. ? Weakness. ? Pain.     · You lose bladder or bowel control. Watch closely for changes in your health, and be sure to contact your doctor if:    · You have a fever, lose weight, or don't feel well.     · You are not getting better as expected. Where can you learn more? Go to https://VertiFlexabrahameb.Origami Energy. org and sign in to your LEAFER account. Enter F934 in the Digifeye box to learn more about \"Back Strain: Care Instructions. \"     If you do not have an account, please click on the \"Sign Up Now\" link. Current as of: November 16, 2020               Content Version: 12.8  © 2006-2021 Healthwise, Incorporated. Care instructions adapted under license by [facility] ([facility]). If you have questions about a medical condition or this instruction, always ask your healthcare professional. [address] any warranty or liability for your use of this information. Patient Education        Back Stretches: Exercises  Introduction  Here are some examples of exercises for stretching your back. Start each exercise slowly. Ease off the exercise if you start to have pain. Your doctor or physical therapist will tell you when you can start these exercises and which ones will work best for you. How to do the exercises  Overhead stretch   10. Stand comfortably with your feet shoulder-width apart. 11. Looking straight ahead, raise both arms over your head and reach toward the ceiling. Do not allow your head to tilt back. 12. Hold for 15 to 30 seconds, then lower your arms to your sides. 13. Repeat 2 to 4 times. Side stretch   1. Stand comfortably with your feet shoulder-width apart. 2. Raise one arm over your head, and then lean to the other side. 3. Slide your hand down your leg as you let the weight of your arm gently stretch your side muscles. Hold for 15 to 30 seconds. 4. Repeat 2 to 4 times on each side. Press-up   1. Lie on your stomach, supporting your body with your forearms. 2. Press your elbows down into the floor to raise your upper back. As you do this, relax your stomach muscles and allow your back to arch without using your back muscles. As your press up, do not let your hips or pelvis come off the floor. 3. Hold for 15 to 30 seconds, then relax. 4. Repeat 2 to 4 times. Relax and rest   1. Lie on your back with a rolled towel under your neck and a pillow under your knees. Extend your arms comfortably to your sides. 2. Relax and breathe normally. 3. Remain in this position for about 10 minutes. 4. If you can, do this 2 or 3 times each day. Follow-up care is a key part of your treatment and safety. Be sure to make and go to all appointments, and call your doctor if you are having problems. It's also a good idea to know your test results and keep a list of the medicines you take. Where can you learn more? Go to https://AbcampeSigmascreening.Draker. org and sign in to your Movimento Group account. Enter B189 in the Opentopic box to learn more about \"Back Stretches: Exercises. \"     If you do not have an account, please click on the \"Sign Up Now\" link. Current as of: November 16, 2020               Content Version: 12.8  © 8296-6116 Healthwise, Taggs. Care instructions adapted under license by [facility] ([facility]). If you have questions about a medical condition or this instruction, always ask your healthcare professional. [place] [address] any warranty or liability for your use of this information. Patient Education        Low Back Pain: Exercises  Introduction  Here are some examples of exercises for you to try. The exercises may be suggested for a condition or for rehabilitation. Start each exercise slowly. Ease off the exercises if you start to have pain. You will be told when to start these exercises and which ones will work best for you. How to do the exercises  Press-up   1. Lie on your stomach, supporting your body with your forearms. 2. Press your elbows down into the floor to raise your upper back. As you do this, relax your stomach muscles and allow your back to arch without using your back muscles. As your press up, do not let your hips or pelvis come off the floor. 3. Hold for 15 to 30 seconds, then relax. 4. Repeat 2 to 4 times. Alternate arm and leg (bird dog) exercise   Do this exercise slowly.  Try to keep your body straight at all times, and do not let one hip drop lower than the other. 1. Start on the floor, on your hands and knees. 2. Tighten your belly muscles. 3. Raise one leg off the floor, and hold it straight out behind you. Be careful not to let your hip drop down, because that will twist your trunk. 4. Hold for about 6 seconds, then lower your leg and switch to the other leg. 5. Repeat 8 to 12 times on each leg. 6. Over time, work up to holding for 10 to 30 seconds each time. 7. If you feel stable and secure with your leg raised, try raising the opposite arm straight out in front of you at the same time. Knee-to-chest exercise   1. Lie on your back with your knees bent and your feet flat on the floor. 2. Bring one knee to your chest, keeping the other foot flat on the floor (or keeping the other leg straight, whichever feels better on your lower back). 3. Keep your lower back pressed to the floor. Hold for at least 15 to 30 seconds. 4. Relax, and lower the knee to the starting position. 5. Repeat with the other leg. Repeat 2 to 4 times with each leg. 6. To get more stretch, put your other leg flat on the floor while pulling your knee to your chest.    Curl-ups   1. Lie on the floor on your back with your knees bent at a 90-degree angle. Your feet should be flat on the floor, about 12 inches from your buttocks. 2. Cross your arms over your chest. If this bothers your neck, try putting your hands behind your neck (not your head), with your elbows spread apart. 3. Slowly tighten your belly muscles and raise your shoulder blades off the floor. 4. Keep your head in line with your body, and do not press your chin to your chest.  5. Hold this position for 1 or 2 seconds, then slowly lower yourself back down to the floor. 6. Repeat 8 to 12 times. Pelvic tilt exercise   1. Lie on your back with your knees bent. 2. \"Brace\" your stomach. This means to tighten your muscles by pulling in and imagining your belly button moving toward your spine.  You should feel like your back is pressing to the floor and your hips and pelvis are rocking back. 3. Hold for about 6 seconds while you breathe smoothly. 4. Repeat 8 to 12 times. Heel dig bridging   1. Lie on your back with both knees bent and your ankles bent so that only your heels are digging into the floor. Your knees should be bent about 90 degrees. 2. Then push your heels into the floor, squeeze your buttocks, and lift your hips off the floor until your shoulders, hips, and knees are all in a straight line. 3. Hold for about 6 seconds as you continue to breathe normally, and then slowly lower your hips back down to the floor and rest for up to 10 seconds. 4. Do 8 to 12 repetitions. Hamstring stretch in doorway   1. Lie on your back in a doorway, with one leg through the open door. 2. Slide your leg up the wall to straighten your knee. You should feel a gentle stretch down the back of your leg. 3. Hold the stretch for at least 15 to 30 seconds. Do not arch your back, point your toes, or bend either knee. Keep one heel touching the floor and the other heel touching the wall. 4. Repeat with your other leg. 5. Do 2 to 4 times for each leg. Hip flexor stretch   1. Kneel on the floor with one knee bent and one leg behind you. Place your forward knee over your foot. Keep your other knee touching the floor. 2. Slowly push your hips forward until you feel a stretch in the upper thigh of your rear leg. 3. Hold the stretch for at least 15 to 30 seconds. Repeat with your other leg. 4. Do 2 to 4 times on each side. Wall sit   1. Stand with your back 10 to 12 inches away from a wall. 2. Lean into the wall until your back is flat against it. 3. Slowly slide down until your knees are slightly bent, pressing your lower back into the wall. 4. Hold for about 6 seconds, then slide back up the wall. 5. Repeat 8 to 12 times. Follow-up care is a key part of your treatment and safety.  Be sure to make and go to all appointments, and call your doctor if you are having problems. It's also a good idea to know your test results and keep a list of the medicines you take. Where can you learn more? Go to https://chpemagui.Healthagen. org and sign in to your [employer] account. Enter N895 in the [facility] box to learn more about \"Low Back Pain: Exercises. \"     If you do not have an account, please click on the \"Sign Up Now\" link. Current as of: November 16, 2020               Content Version: 12.8  © 2006-2021 [employer]. Care instructions adapted under license by [facility] ([facility]). If you have questions about a medical condition or this instruction, always ask your healthcare professional. [place] [address] any warranty or liability for your use of this information. Patient Education        Learning About Low Back Pain  What is low back pain? Low back pain is pain that can occur anywhere below the ribs and above the legs. It is very common. Almost everyone has it at one time or another. Low back pain can be:  · Acute. This is new pain that can last a few days to a few weeksat the most a few months. · Chronic. This pain can last for more than a few months. Sometimes it can last for years. What are some myths about low back pain? Here are some common myths about low back painand the facts:  Myth: \"I need to rest my back when I have back pain. \"   Fact: Staying active won't hurt you. It may help you get better faster. Myth: \"I need prescription pain medicine. \"   Fact: It's best to try to let time and being active heal your back. Opioid pain medicinessuch as hydrocodone or oxycodoneusually don't work any better than over-the-counter medicines like ibuprofen or naproxen. And opioids can cause serious problems like opioid use disorder or overdose. Moderate to severe opioid use disorder is sometimes called addiction.   Myth: \"I need a test like an X-ray or an MRI to diagnose my low back pain. \"   Fact: Getting a test right away won't help you get better faster. And it could lead you down a treatment path you may not need, since most people get better on their own. What causes low back pain? In most cases, there isn't a clear cause. This can be frustrating, because your back hurts and there's no obvious reason. Your back pain can be caused by:  Overuse or muscle strain. This can happen from playing sports, lifting heavy things, or not being physically fit. A herniated disc. This is a problem with the cushion between the bones in your back. Arthritis. With age, you may have changes in your bones that can narrow the space around your nerves. Other causes. In rare cases, the cause is a serious illness like an infection or cancer. But there are usually other symptoms too. What are the symptoms? Back pain can come on quickly or over time. You may feel:  · Pain in your hips or buttock. · Leg pain, numbness, tingling, or weakness. When a nerve gets squeezedsuch as from a disc problem or arthritisyou may have symptoms in your leg or foot. You can even have leg symptoms from a back problem without having any pain in your back. · Pain that's sharp or dull, sometimes with stiffness or muscle spasms. It may be in one small area or over a broad area. But even bad pain doesn't mean that it's caused by something serious. How is low back pain diagnosed? A physical exam is the main way to diagnose low back pain. Your doctor may examine your back, check your nerves by testing your reflexes, and make sure that your muscles are strong. Your doctor also will ask questions about your back and overall health. Most people don't need any tests right away. Tests often don't show the reason for your pain. If your pain lasts more than 6 weeks or you have symptoms that your doctor is more concerned about, then your doctor may order tests.  These may include an X-ray, a CT scan, or an MRI. Sometimes other tests such as a bone scan or nerve conduction test may be done. How is low back pain treated? Most acute low back pain gets better on its own within a few weeks, no matter what the cause. Time and doing usual activities are all that most people need to feel better. Using heat or ice and taking over-the-counter pain medicine also can help while your body heals. If you aren't getting better on your own or your pain is very bad, your doctor may recommend:  · Physical therapy. · Spinal manipulation, such as by a chiropractor. · Acupuncture. · Massage. · Injections of steroid medicine in your back (especially for pain that involves your legs). If you have chronic low back pain, treatment will help you understand and manage your pain. Treatment may include:  · Staying active. This may include walking or doing back exercises. · Physical therapy. · Medicines. Some of these medicines are also used for other problems, like depression. · Pain management. Your doctor may have you see a pain specialist.  · Counseling. Having chronic pain can be hard. It may help to talk to someone who can help you cope with your pain. Surgery isn't needed for most people. But it may help some types of low back pain. Follow-up care is a key part of your treatment and safety. Be sure to make and go to all appointments, and call your doctor if you are having problems. It's also a good idea to know your test results and keep a list of the medicines you take. When should you call for help? Call 911 anytime you think you may need emergency care. For example, call if:  · You can't move a leg at all. Call your doctor now or seek immediate medical care if:  · You have new or worse symptoms in your legs, belly, or buttocks. Symptoms may include:  ? Numbness or tingling. ? Weakness. ? Pain. · You lose bladder or bowel control.   Watch closely for changes in your health, and be sure to contact your doctor if:  · Along with the back pain, you have a fever, lose weight, or don't feel well. · You do not get better as expected. Where can you learn more? Go to https://RecommindpePlaySight.BiggiFi. org and sign in to your [employer] account. Enter A007 in the [facility] box to learn more about \"Learning About Low Back Pain. \"     If you do not have an account, please click on the \"Sign Up Now\" link. Current as of: November 16, 2020               Content Version: 12.8  © 2006-2021 [employer]. Care instructions adapted under license by [facility] ([facility]). If you have questions about a medical condition or this instruction, always ask your healthcare professional. [address] any warranty or liability for your use of this information. Patient Education        Getting Back to Normal After Low Back Pain: Care Instructions  Your Care Instructions  Almost everyone has low back pain at some time. The good news is that most low back pain will go away in a few days or weeks with some basic self-care. Some people are afraid that doing too much may make their pain worse. In the past, people stayed in bed, thinking this would help their backs. Now doctors think that, in most cases, getting back to your normal activities is good for your back, as long as you avoid doing things that make your pain worse. Follow-up care is a key part of your treatment and safety. Be sure to make and go to all appointments, and call your doctor if you are having problems. It's also a good idea to know your test results and keep a list of the medicines you take. How can you care for yourself at home? Ease back into daily activities  · For the first day or two of pain, take it easy. But as soon as you can, get back to your normal daily life and activities. · Get gentle exercise, such as walking. Movement keeps your spine flexible and helps your muscles stay strong.   · If you are an athlete, return to your activity carefully. Choose a low-impact option until your pain is under control. Avoid or change activities that cause pain  · Try to avoid too much bending, heavy lifting, or reaching. These movements put extra stress on your back. · In bed, try lying on your side with a pillow between your knees. Or lie on your back on the floor with a pillow under your knees. · When you sit, place a small pillow, a rolled-up towel, or a lumbar roll in the curve of your back for extra support. · Try putting one foot up on a stool or changing positions every few minutes if you have to stand still for a period of time. Pay attention to body mechanics and posture  Body mechanics are the way you use your body. Posture is the way you sit or stand. · Take extra care when you lift. When you must lift, bend your knees and keep your back straight. Avoid twisting, and keep the load close to your body. · Stand or sit tall, with your shoulders back and your stomach pulled in to support your back. Get support when you need it  · Let people know when you need a helping hand. Get family members or friends to help out with tasks you can't do right now. · Be honest with your doctor about how the pain affects you. · If you've had to take time off work, talk to your doctor and boss about a gradual [id number] plan. Find out if there are other ways you could do your job to avoid hurting your back again. Reduce stress  Worrying about the pain can cause you to tense the muscles in your lower back. This in turn causes more pain. Here are a few things you can do to relax your mind and your muscles:  · Take 10 to 15 minutes to sit quietly and breathe deeply. Try to focus only on your breathing. If you can't keep thoughts away, think about things that make you feel good. · Get involved in your favorite hobby, or try something new. · Talk to a friend, read a book, or listen to your favorite music.   · Find a counselor you like and trust. Talk openly and honestly about your problems. Be willing to make some changes. When should you call for help? Call 911 anytime you think you may need emergency care. For example, call if:    · You are unable to move a leg at all. Call your doctor now or seek immediate medical care if:    · You have new or worse symptoms in your legs, belly, or buttocks. Symptoms may include:  ? Numbness or tingling. ? Weakness. ? Pain.     · You lose bladder or bowel control. Watch closely for changes in your health, and be sure to contact your doctor if:    · You have a fever, lose weight, or don't feel well.     · You are not getting better as expected. Where can you learn more? Go to https://Leadwerks.DiscountDoc. org and sign in to your [employer] account. Enter T572 in the [facility] box to learn more about \"Getting Back to Normal After Low Back Pain: Care Instructions. \"     If you do not have an account, please click on the \"Sign Up Now\" link. Current as of: November 16, 2020               Content Version: 12.8  © 6797-4467 [employer]. Care instructions adapted under license by [facility] ([facility]). If you have questions about a medical condition or this instruction, always ask your healthcare professional. [place] [address] any warranty or liability for your use of this information. Patient Education        Acute Low Back Pain: Exercises  Introduction  Here are some examples of typical rehabilitation exercises for your condition. Start each exercise slowly. Ease off the exercise if you start to have pain. Your doctor or physical therapist will tell you when you can start these exercises and which ones will work best for you. When you are not being active, find a comfortable position for rest. Some people are comfortable on the floor or a medium-firm bed with a small pillow under their head and another under their knees.  Some people prefer to lie on their side with a pillow between their knees. Don't stay in one position for too long. Take short walks (10 to 20 minutes) every 2 to 3 hours. Avoid slopes, hills, and stairs until you feel better. Walk only distances you can manage without pain, especially leg pain. How to do the exercises  Back stretches   19. Get down on your hands and knees on the floor. 20. Relax your head and allow it to droop. Round your back up toward the ceiling until you feel a nice stretch in your upper, middle, and lower back. Hold this stretch for as long as it feels comfortable, or about 15 to 30 seconds. 21. Return to the starting position with a flat back while you are on your hands and knees. 22. Let your back sway by pressing your stomach toward the floor. Lift your buttocks toward the ceiling. 23. Hold this position for 15 to 30 seconds. 24. Repeat 2 to 4 times. Follow-up care is a key part of your treatment and safety. Be sure to make and go to all appointments, and call your doctor if you are having problems. It's also a good idea to know your test results and keep a list of the medicines you take. Where can you learn more? Go to https://Listnerd.Vibrynt. org and sign in to your [employer] account. Enter S345 in the [employer] box to learn more about \"Acute Low Back Pain: Exercises. \"     If you do not have an account, please click on the \"Sign Up Now\" link. Current as of: November 16, 2020               Content Version: 12.8  © 2006-2021 [employer]. Care instructions adapted under license by [facility] ([facility]). If you have questions about a medical condition or this instruction, always ask your healthcare professional. [place] [address] any warranty or liability for your use of this information.
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
## 2021-03-21 PROBLEM — Z11.59 NEED FOR HEPATITIS C SCREENING TEST: Status: RESOLVED | Noted: 2021-02-19 | Resolved: 2021-03-21

## 2021-03-31 DIAGNOSIS — I10 ESSENTIAL HYPERTENSION: ICD-10-CM

## 2021-03-31 DIAGNOSIS — N18.30 STAGE 3 CHRONIC KIDNEY DISEASE (HCC): ICD-10-CM

## 2021-03-31 RX ORDER — LISINOPRIL 40 MG/1
TABLET ORAL
Qty: 30 TABLET | Refills: 4 | Status: SHIPPED | OUTPATIENT
Start: 2021-03-31 | End: 2021-08-12 | Stop reason: SDUPTHER

## 2021-03-31 NOTE — TELEPHONE ENCOUNTER
Lisinopril pending for refill     Health Maintenance   Topic Date Due    Hepatitis C screen  Never done    Diabetic retinal exam  Never done    COVID-19 Vaccine (1) Never done    Annual Wellness Visit (AWV)  Never done    Flu vaccine (1) Never done    Hepatitis B vaccine (2 of 3 - Risk 3-dose series) 03/19/2021    Creatinine monitoring  02/19/2022 (Originally 2/8/2020)    Shingles Vaccine (2 of 2) 05/03/2021    Lipid screen  09/22/2021    Potassium monitoring  02/04/2022    Diabetic foot exam  02/19/2022    A1C test (Diabetic or Prediabetic)  02/19/2022    Colon cancer screen fecal DNA test (Cologuard)  02/10/2023    DTaP/Tdap/Td vaccine (2 - Td) 02/01/2029    Pneumococcal 0-64 years Vaccine  Completed    HIV screen  Completed    Hepatitis A vaccine  Aged Out    Hib vaccine  Aged Out    Meningococcal (ACWY) vaccine  Aged Out             (applicable per patient's age: Cancer Screenings, Depression Screening, Fall Risk Screening, Immunizations)    Hemoglobin A1C (%)   Date Value   02/19/2021 7.7   01/17/2020 5.6   10/17/2019 5.3     Microalb/Crt.  Ratio (mcg/mg creat)   Date Value   02/01/2019 196 (H)     LDL Cholesterol (mg/dL)   Date Value   09/22/2020 67     AST (U/L)   Date Value   02/04/2021 24     ALT (U/L)   Date Value   02/04/2021 21     BUN (mg/dL)   Date Value   02/04/2021 15      (goal A1C is < 7)   (goal LDL is <100) need 30-50% reduction from baseline     BP Readings from Last 3 Encounters:   03/19/21 119/74   02/19/21 (!) 160/102   06/01/20 (!) 150/90    (goal /80)      All Future Testing planned in [facility]:  Lab Frequency Next Occurrence   Hepatitis C Antibody Once 06/01/2021   XR LUMBAR SPINE (2-3 VIEWS) Once 03/19/2021       Next Visit Date:  Future Appointments   Date Time Provider Lizette Marques   4/21/2021  3:30 PM SCHEDULE, [facility] LPN Isabela Chamberlain            Patient Active Problem List:     Essential hypertension     Chronic back pain     Morbid obesity with BMI of 50.0-59.9, adult ([facility])     ZURDO (obstructive sleep apnea)     Gout     CKD (chronic kidney disease)     Mixed hyperlipidemia     Squamous cell carcinoma of face     Type 2 diabetes mellitus without complication, without long-term current use of insulin ([facility]     Need for prophylactic vaccination and inoculation against varicella     Vitamin D deficiency     BMI 50.0-59.9, adult ([facility]

## 2021-04-05 ENCOUNTER — HOSPITAL ENCOUNTER (OUTPATIENT)
Age: 51
Discharge: HOME | End: 2021-04-07
Payer: MEDICARE

## 2021-04-05 ENCOUNTER — HOSPITAL ENCOUNTER (OUTPATIENT)
Dept: GENERAL RADIOLOGY | Age: 51
Discharge: HOME | End: 2021-04-07
Payer: MEDICARE

## 2021-04-05 DIAGNOSIS — G89.29 CHRONIC BILATERAL LOW BACK PAIN WITHOUT SCIATICA: ICD-10-CM

## 2021-04-05 DIAGNOSIS — M54.50 CHRONIC BILATERAL LOW BACK PAIN WITHOUT SCIATICA: ICD-10-CM

## 2021-04-05 PROCEDURE — 72100 X-RAY EXAM L-S SPINE 2/3 VWS: CPT

## 2021-04-08 ENCOUNTER — TELEPHONE (OUTPATIENT)
Dept: FAMILY MEDICINE CLINIC | Age: 51
End: 2021-04-08

## 2021-04-08 NOTE — TELEPHONE ENCOUNTER
Patient called stating the pharmacy keeps refilling his hydralazine 25 mg which he is having to take 8 a day at his pharmacy instead of the 50mg 4 times a day, pharmacy told him they will not fill the 50 mg until they get the okay from his doctor. Please advise.

## 2021-04-30 DIAGNOSIS — I10 ESSENTIAL HYPERTENSION: ICD-10-CM

## 2021-04-30 RX ORDER — AMLODIPINE BESYLATE 10 MG/1
TABLET ORAL
Qty: 90 TABLET | Refills: 1 | Status: SHIPPED | OUTPATIENT
Start: 2021-04-30 | End: 2021-08-12 | Stop reason: SDUPTHER

## 2021-04-30 NOTE — TELEPHONE ENCOUNTER
Last visit: 03/19/21  Last Med refill:   Does patient have enough medication for 72 hours:     Next Visit Date:  No future appointments. Health Maintenance   Topic Date Due    Hepatitis C screen  Never done    Diabetic retinal exam  Never done    COVID-19 Vaccine (1) Never done    Annual Wellness Visit (AWV)  Never done    Hepatitis B vaccine (2 of 3 - Risk 3-dose series) 03/19/2021    Creatinine monitoring  02/19/2022 (Originally 2/8/2020)    Shingles Vaccine (2 of 2) 05/03/2021    Flu vaccine (Season Ended) 09/01/2021    Lipid screen  09/22/2021    Potassium monitoring  02/04/2022    Diabetic foot exam  02/19/2022    A1C test (Diabetic or Prediabetic)  02/19/2022    Colon cancer screen fecal DNA test (Cologuard)  02/10/2023    DTaP/Tdap/Td vaccine (2 - Td) 02/01/2029    Pneumococcal 0-64 years Vaccine  Completed    HIV screen  Completed    Hepatitis A vaccine  Aged Out    Hib vaccine  Aged Out    Meningococcal (ACWY) vaccine  Aged Out       Hemoglobin A1C (%)   Date Value   02/19/2021 7.7   01/17/2020 5.6   10/17/2019 5.3             ( goal A1C is < 7)   Microalb/Crt.  Ratio (mcg/mg creat)   Date Value   02/01/2019 196 (H)     LDL Cholesterol (mg/dL)   Date Value   09/22/2020 67   06/11/2019 113       (goal LDL is <100)   AST (U/L)   Date Value   02/04/2021 24     ALT (U/L)   Date Value   02/04/2021 21     BUN (mg/dL)   Date Value   02/04/2021 15     BP Readings from Last 3 Encounters:   03/19/21 119/74   02/19/21 (!) 160/102   06/01/20 (!) 150/90          (goal 120/80)    All Future Testing planned in [facility]  Lab Frequency Next Occurrence   Hepatitis C Antibody Once 06/01/2021               Patient Active Problem List:     Essential hypertension     Chronic back pain     Morbid obesity with BMI of 50.0-59.9, adult ([facility])     ZURDO (obstructive sleep apnea)     Gout     CKD (chronic kidney disease)     Mixed hyperlipidemia     Squamous cell carcinoma of face     Type 2 diabetes mellitus without complication, without long-term current use of insulin ([facility]     Need for prophylactic vaccination and inoculation against varicella     Vitamin D deficiency     BMI 50.0-59.9, adult ([facility]

## 2021-05-03 RX ORDER — HYDRALAZINE HYDROCHLORIDE 25 MG/1
TABLET, FILM COATED ORAL
Qty: 90 TABLET | Status: CANCELLED | OUTPATIENT
Start: 2021-05-03

## 2021-05-03 NOTE — TELEPHONE ENCOUNTER
Rohan Request for pending medication. Last Visit Date: 3/19/21  Next Visit Date:  Future Appointments   Date Time Provider Lizette Marques   5/6/2021  2:30 PM SCHEDULE, [facility] Maintenance   Topic Date Due    Hepatitis C screen  Never done    Diabetic retinal exam  Never done    COVID-19 Vaccine (1) Never done    Annual Wellness Visit (AWV)  Never done    Hepatitis B vaccine (2 of 3 - Risk 3-dose series) 03/19/2021    Shingles Vaccine (2 of 2) 05/03/2021    Creatinine monitoring  02/19/2022 (Originally 2/8/2020)    Flu vaccine (Season Ended) 09/01/2021    Lipid screen  09/22/2021    Potassium monitoring  02/04/2022    Diabetic foot exam  02/19/2022    A1C test (Diabetic or Prediabetic)  02/19/2022    Colon cancer screen fecal DNA test (Cologuard)  02/10/2023    DTaP/Tdap/Td vaccine (2 - Td) 02/01/2029    Pneumococcal 0-64 years Vaccine  Completed    HIV screen  Completed    Hepatitis A vaccine  Aged Out    Hib vaccine  Aged Out    Meningococcal (ACWY) vaccine  Aged Out       Hemoglobin A1C (%)   Date Value   02/19/2021 7.7   01/17/2020 5.6   10/17/2019 5.3             ( goal A1C is < 7)   Microalb/Crt.  Ratio (mcg/mg creat)   Date Value   02/01/2019 196 (H)     LDL Cholesterol (mg/dL)   Date Value   09/22/2020 67       (goal LDL is <100)   AST (U/L)   Date Value   02/04/2021 24     ALT (U/L)   Date Value   02/04/2021 21     BUN (mg/dL)   Date Value   02/04/2021 15     BP Readings from Last 3 Encounters:   03/19/21 119/74   02/19/21 (!) 160/102   06/01/20 (!) 150/90          (goal 120/80)    All Future Testing planned in [facility]  Lab Frequency Next Occurrence   Hepatitis C Antibody Once 06/01/2021       Next Visit Date:  Future Appointments   Date Time Provider Lizette Marques   5/6/2021  2:30 PM SCHEDULE, [facility] Oppenheim         Patient Active Problem List:     Essential hypertension     Chronic back pain     Morbid

## 2021-05-03 NOTE — TELEPHONE ENCOUNTER
Pt has prescription for Hydralazine 50 QID with 5 refills sent in Feb. Should have enough through august 2021.

## 2021-05-06 ENCOUNTER — OFFICE VISIT (OUTPATIENT)
Dept: FAMILY MEDICINE CLINIC | Age: 51
End: 2021-05-06
Payer: MEDICARE

## 2021-05-06 VITALS
TEMPERATURE: 98.2 F | DIASTOLIC BLOOD PRESSURE: 84 MMHG | HEART RATE: 80 BPM | WEIGHT: 315 LBS | OXYGEN SATURATION: 99 % | BODY MASS INDEX: 45.1 KG/M2 | SYSTOLIC BLOOD PRESSURE: 140 MMHG | RESPIRATION RATE: 18 BRPM | HEIGHT: 70 IN

## 2021-05-06 DIAGNOSIS — I10 ESSENTIAL HYPERTENSION: ICD-10-CM

## 2021-05-06 DIAGNOSIS — Z00.00 ROUTINE GENERAL MEDICAL EXAMINATION AT A HEALTH CARE FACILITY: Primary | ICD-10-CM

## 2021-05-06 PROCEDURE — G0438 PPPS, INITIAL VISIT: HCPCS | Performed by: FAMILY MEDICINE

## 2021-05-06 PROCEDURE — 3017F COLORECTAL CA SCREEN DOC REV: CPT | Performed by: FAMILY MEDICINE

## 2021-05-06 RX ORDER — HYDRALAZINE HYDROCHLORIDE 50 MG/1
50 TABLET, FILM COATED ORAL 4 TIMES DAILY
Qty: 120 TABLET | Refills: 0 | Status: SHIPPED | OUTPATIENT
Start: 2021-05-06 | End: 2021-06-04

## 2021-05-06 ASSESSMENT — PATIENT HEALTH QUESTIONNAIRE - PHQ9
SUM OF ALL RESPONSES TO PHQ QUESTIONS 1-9: 0
2. FEELING DOWN, DEPRESSED OR HOPELESS: 0
SUM OF ALL RESPONSES TO PHQ9 QUESTIONS 1 & 2: 0
1. LITTLE INTEREST OR PLEASURE IN DOING THINGS: 0

## 2021-05-06 ASSESSMENT — LIFESTYLE VARIABLES
HOW OFTEN DURING THE LAST YEAR HAVE YOU BEEN UNABLE TO REMEMBER WHAT HAPPENED THE NIGHT BEFORE BECAUSE YOU HAD BEEN DRINKING: 0
AUDIT-C TOTAL SCORE: 1
HOW MANY STANDARD DRINKS CONTAINING ALCOHOL DO YOU HAVE ON A TYPICAL DAY: 0
HOW OFTEN DO YOU HAVE A DRINK CONTAINING ALCOHOL: 1
HAVE YOU OR SOMEONE ELSE BEEN INJURED AS A RESULT OF YOUR DRINKING: 0

## 2021-05-06 NOTE — PROGRESS NOTES
Medicare Annual Wellness Visit  Name: Ro Neal Date: 2021   MRN: [id number] Sex: Male   Age: 45 y.o. Ethnicity: Non-/Non    : 1970 Race: Angely Bell is here for Medicare AWV    Screenings for behavioral, psychosocial and functional/safety risks, and cognitive dysfunction are all negative except as indicated below. These results, as well as other patient data from the [address] form, are documented in Flowsheets linked to this Encounter. No Known Allergies    Prior to Visit Medications    Medication Sig Taking? Authorizing Provider   amLODIPine (NORVASC) 10 MG tablet take 1 tablet by mouth once daily Yes Jose Rodríguez MD   lisinopril (PRINIVIL;ZESTRIL) 40 MG tablet take 1 tablet by mouth once daily Yes Marie Alcala MD   diclofenac sodium (VOLTAREN) 1 % GEL Apply topically 2 times daily Yes Marie Alcala MD   acetaminophen (APAP EXTRA STRENGTH) 500 MG tablet Take 2 tablets by mouth every 6 hours as needed for Pain Yes Marie Alcala MD   cyclobenzaprine (FLEXERIL) 10 MG tablet Take 1 tablet by mouth 3 times daily as needed for Muscle spasms Yes Dalila Bond MD   zoster recombinant adjuvanted vaccine (SHINGRIX) 50 MCG/0.5ML SUSR injection 50 MCG IM then repeat 2-6 months.  Yes Marie Alcala MD   labetalol (NORMODYNE) 300 MG tablet take 1 tablet by mouth twice a day Yes Marie Alcala MD   colchicine (COLCRYS) 0.6 MG tablet take 1 tablet by mouth twice a day DURING GOUT FLARE UP Yes Marie Alcala MD   rosuvastatin (CRESTOR) 20 MG tablet take 1 tablet by mouth once daily Yes Marie Alcala MD   Blood Pressure KIT 1 kit by Does not apply route daily Yes Marie Alcala MD   RA VITAMIN D-3 50 MCG (2000) CAPS Take 1 capsule by mouth daily  Patient not taking: Reported on 2021  Marie Alcala MD   aspirin EC 81 MG EC tablet Take 1 tablet by mouth daily  Patient not taking: Reported on 2021  Marie Alcala MD   colchicine (COLCRYS) 0.6 MG tablet take 1 tablet by mouth twice a day DURING GOUT FLARE UP  Hans Alvarado MD   hydrALAZINE (APRESOLINE) 50 MG tablet Take 1 tablet by mouth 4 times daily  Hans Alvarado MD   labetalol (NORMODYNE) 300 MG tablet take 1 tablet by mouth twice a day  Hans Alvarado MD   lisinopril (PRINIVIL;ZESTRIL) 40 MG tablet Take 1 tablet by mouth daily  Hans Alvarado MD       Past Medical History:   Diagnosis Date    Hypertension     Obesity        No past surgical history on file. No family history on file. CareTeam (Including outside providers/suppliers regularly involved in providing care):   Patient Care Team:  Mamta Lechuga MD as PCP - General (Emergency Medicine)  Josh Mccoy [facility] as PCP [facility]    Wt Readings from Last 3 Encounters:   05/06/21 (!) 338 lb (153.3 kg)   03/19/21 (!) 344 lb 12.8 oz (156.4 kg)   02/19/21 (!) 346 lb (156.9 kg)     Vitals:    05/06/21 1433   BP: (!) 150/89   Site: Left Upper Arm   Position: Sitting   Cuff Size: Large Adult   Pulse: 80   Resp: 18   Temp: 98.2 °F (36.8 °C)   TempSrc: Temporal   SpO2: 99%   Weight: (!) 338 lb (153.3 kg)   Height: 5' 10\" (1.778 m)     Body mass index is 48.5 kg/m². Based upon direct observation of the patient, evaluation of cognition reveals recent and remote memory intact. Patient's complete Health Risk Assessment and screening values have been reviewed and are found in Flowsheets. The following problems were reviewed today and where indicated follow up appointments were made and/or referrals ordered. Positive Risk Factor Screenings with Interventions:      Cognitive:   Words recalled: 0 Words Recalled  Clock Drawing Test (CDT) Score: Normal  Total Score Interpretation: Positive Mini-Cog  Did the patient refuse to take the cognition test?: No  Cognitive Impairment Interventions:  · Patient declines any further evaluation/treatment for cognitive impairment      Substance History:  Social History     Tobacco History     Smoking Status  Former Smoker Smoking Frequency  For 5 years    Smokeless Tobacco Use  Never Used          Alcohol History     Alcohol Use Status  Yes          Drug Use     Drug Use Status  Yes Types  Marijuana Frequency   7 times/week          Sexual Activity     Sexually Active  Not Asked               Alcohol Screening: Audit-C Score: 1  Total Score: 1    A score of 8 or more is associated with harmful or hazardous drinking. A score of 13 or more in women, and 15 or more in men, is likely to indicate alcohol dependence. Substance Abuse Interventions:  · Smokes marijuana for pain control    General Health and ACP:  General  In general, how would you say your health is?: Good  In the past 7 days, have you experienced any of the following?  New or Increased Pain, New or Increased Fatigue, Loneliness, Social Isolation, Stress or Anger?: (!) New or Increased Pain(back and rt hip)  Do you get the social and emotional support that you need?: Yes  Do you have a Living Will?: (!) No(copy given will bring in completed)  Advance Directives     Power of  Living Will ACP-Advance Directive ACP-Power of     Not on File Not on File Not on File Not on File      General Health Risk Interventions:  · No Living Will: Advance Care Planning addressed with patient today    Health Habits/Nutrition:  Health Habits/Nutrition  Do you exercise for at least 20 minutes 2-3 times per week?: Yes(walk treadmill)  Have you lost any weight without trying in the past 3 months?: No  Do you eat only one meal per day?: No  Have you seen the dentist within the past year?: (!) No  Body mass index: (!) 48.49  Health Habits/Nutrition Interventions:  · Dental exam overdue:  patient encouraged to make appointment with his/her dentist    Hearing/Vision:  No exam data present  Hearing/Vision  Do you or your family notice any trouble with your hearing that hasn't been managed with hearing aids?: No  Do you have difficulty driving, watching TV, or doing any of your daily activities because of your eyesight?: No  Have you had an eye exam within the past year?: (!) No  Hearing/Vision Interventions:  · Vision concerns:  patient encouraged to make appointment with his/her eye specialist      Personalized Preventive Plan   Current Health Maintenance Status  Immunization History   Administered Date(s) Administered    Hepatitis B Adult (Engerix-B) 02/19/2021    Pneumococcal Polysaccharide ([id number]) 01/10/2017    Tdap (Boostrix, Adacel) 02/01/2019        Health Maintenance   Topic Date Due    Hepatitis C screen  Never done    Diabetic retinal exam  Never done    COVID-19 Vaccine (1) Never done    Annual Wellness Visit (AWV)  Never done    Hepatitis B vaccine (2 of 3 - Risk 3-dose series) 03/19/2021    Shingles Vaccine (2 of 2) 05/03/2021    Creatinine monitoring  02/19/2022 (Originally 2/8/2020)    Flu vaccine (Season Ended) 09/01/2021    Lipid screen  09/22/2021    Potassium monitoring  02/04/2022    Diabetic foot exam  02/19/2022    A1C test (Diabetic or Prediabetic)  02/19/2022    Colon cancer screen fecal DNA test (Cologuard)  02/10/2023    DTaP/Tdap/Td vaccine (2 - Td) 02/01/2029    Pneumococcal 0-64 years Vaccine  Completed    HIV screen  Completed    Hepatitis A vaccine  Aged Out    Hib vaccine  Aged Out    Meningococcal (ACWY) vaccine  Aged Out     Recommendations for Accurate Group Due: see orders and patient instructions/AVS.  . Recommended screening schedule for the next 5-10 years is provided to the patient in written form: see Patient Instructions/AVS.    Joni Ruggiero LPN, 3/9/9685, performed the documented evaluation under the direct supervision of the attending physician.

## 2021-05-06 NOTE — TELEPHONE ENCOUNTER
mellitus without complication, without long-term current use of insulin ([facility]     Need for prophylactic vaccination and inoculation against varicella     Vitamin D deficiency     BMI 50.0-59.9, adult ([facility]

## 2021-05-06 NOTE — PROGRESS NOTES
BP was elevated on arrival but slowly decreased with rest pt states he is out apresoline. Asking for refill will placed a refill encouter for the medication. Pt states he has no symptoms of dizziness or headaches, No blurred vision. Neuro check is negative.

## 2021-05-06 NOTE — PATIENT INSTRUCTIONS
Personalized Preventive Plan for Eleno Morris - 5/6/2021  Medicare offers a range of preventive health benefits. Some of the tests and screenings are paid in full while other may be subject to a deductible, co-insurance, and/or copay. Some of these benefits include a comprehensive review of your medical history including lifestyle, illnesses that may run in your family, and various assessments and screenings as appropriate. After reviewing your medical record and screening and assessments performed today your provider may have ordered immunizations, labs, imaging, and/or referrals for you. A list of these orders (if applicable) as well as your Preventive Care list are included within your After Visit Summary for your review. Other Preventive Recommendations:    · A preventive eye exam performed by an eye specialist is recommended every 1-2 years to screen for glaucoma; cataracts, macular degeneration, and other eye disorders. · A preventive dental visit is recommended every 6 months. · Try to get at least 150 minutes of exercise per week or 10,000 steps per day on a pedometer . · Order or download the FREE \"Exercise & Physical Activity: Your Everyday Guide\" from [employer]. Call 9-576.765.3502 or search [employer] online. · You need 9810-0211 mg of calcium and 5371-2737 IU of vitamin D per day. It is possible to meet your calcium requirement with diet alone, but a vitamin D supplement is usually necessary to meet this goal.  · When exposed to the sun, use a sunscreen that protects against both UVA and UVB radiation with an SPF of 30 or greater. Reapply every 2 to 3 hours or after sweating, drying off with a towel, or swimming. · Always wear a seat belt when traveling in a car. Always wear a helmet when riding a bicycle or motorcycle.

## 2021-06-04 DIAGNOSIS — I10 ESSENTIAL HYPERTENSION: ICD-10-CM

## 2021-06-04 RX ORDER — HYDRALAZINE HYDROCHLORIDE 50 MG/1
TABLET, FILM COATED ORAL
Qty: 120 TABLET | Refills: 0 | Status: SHIPPED | OUTPATIENT
Start: 2021-06-04 | End: 2021-07-09

## 2021-06-04 NOTE — TELEPHONE ENCOUNTER
mellitus without complication, without long-term current use of insulin ([facility]     Need for prophylactic vaccination and inoculation against varicella     Vitamin D deficiency     BMI 50.0-59.9, adult ([facility]

## 2021-07-09 DIAGNOSIS — I10 ESSENTIAL HYPERTENSION: ICD-10-CM

## 2021-07-09 RX ORDER — HYDRALAZINE HYDROCHLORIDE 50 MG/1
TABLET, FILM COATED ORAL
Qty: 120 TABLET | Refills: 0 | Status: SHIPPED | OUTPATIENT
Start: 2021-07-09 | End: 2021-08-09

## 2021-07-09 NOTE — TELEPHONE ENCOUNTER
Please have patient follow up in the clinic in 1 month for BP check, he is also going to be due for a lipid panel soon. I will refill his BP medication for one month.      Thanks,     Dr. Lucila Smith

## 2021-07-09 NOTE — TELEPHONE ENCOUNTER
E-scribe request for hydralazine. Please review and e-scribe if applicable. Last Visit Date: 5/6/2021  Next Visit Date:  7/12/2021    Hemoglobin A1C (%)   Date Value   02/19/2021 7.7   01/17/2020 5.6   10/17/2019 5.3             ( goal A1C is < 7)   Microalb/Crt.  Ratio (mcg/mg creat)   Date Value   02/01/2019 196 (H)     LDL Cholesterol (mg/dL)   Date Value   09/22/2020 67       (goal LDL is <100)   AST (U/L)   Date Value   02/04/2021 24     ALT (U/L)   Date Value   02/04/2021 21     BUN (mg/dL)   Date Value   02/04/2021 15     BP Readings from Last 3 Encounters:   05/06/21 (!) 140/84   03/19/21 119/74   02/19/21 (!) 160/102          (goal 120/80)        Patient Active Problem List:     Essential hypertension     Chronic back pain     Morbid obesity with BMI of 50.0-59.9, adult (HCC)     ZURDO (obstructive sleep apnea)     Gout     CKD (chronic kidney disease)     Mixed hyperlipidemia     Squamous cell carcinoma of face     Type 2 diabetes mellitus without complication, without long-term current use of insulin ([facility]     Need for prophylactic vaccination and inoculation against varicella     Vitamin D deficiency     BMI 50.0-59.9, adult ([facility]

## 2021-07-12 ENCOUNTER — OFFICE VISIT (OUTPATIENT)
Dept: FAMILY MEDICINE CLINIC | Age: 51
End: 2021-07-12
Payer: MEDICARE

## 2021-07-12 VITALS
BODY MASS INDEX: 45.1 KG/M2 | HEART RATE: 83 BPM | HEIGHT: 70 IN | SYSTOLIC BLOOD PRESSURE: 147 MMHG | DIASTOLIC BLOOD PRESSURE: 88 MMHG | TEMPERATURE: 97.9 F | WEIGHT: 315 LBS

## 2021-07-12 DIAGNOSIS — M62.830 BACK SPASM: ICD-10-CM

## 2021-07-12 DIAGNOSIS — E11.9 TYPE 2 DIABETES MELLITUS WITHOUT COMPLICATION, WITHOUT LONG-TERM CURRENT USE OF INSULIN (HCC): Primary | ICD-10-CM

## 2021-07-12 DIAGNOSIS — M54.50 CHRONIC BILATERAL LOW BACK PAIN WITHOUT SCIATICA: ICD-10-CM

## 2021-07-12 DIAGNOSIS — E78.2 MIXED HYPERLIPIDEMIA: ICD-10-CM

## 2021-07-12 DIAGNOSIS — G89.29 CHRONIC BILATERAL LOW BACK PAIN WITHOUT SCIATICA: ICD-10-CM

## 2021-07-12 DIAGNOSIS — M1A.9XX1 CHRONIC GOUT INVOLVING TOE OF LEFT FOOT WITH TOPHUS, UNSPECIFIED CAUSE: ICD-10-CM

## 2021-07-12 LAB — HBA1C MFR BLD: 10.6 %

## 2021-07-12 PROCEDURE — G8427 DOCREV CUR MEDS BY ELIG CLIN: HCPCS | Performed by: STUDENT IN AN ORGANIZED HEALTH CARE EDUCATION/TRAINING PROGRAM

## 2021-07-12 PROCEDURE — G8417 CALC BMI ABV UP PARAM F/U: HCPCS | Performed by: STUDENT IN AN ORGANIZED HEALTH CARE EDUCATION/TRAINING PROGRAM

## 2021-07-12 PROCEDURE — 2022F DILAT RTA XM EVC RTNOPTHY: CPT | Performed by: STUDENT IN AN ORGANIZED HEALTH CARE EDUCATION/TRAINING PROGRAM

## 2021-07-12 PROCEDURE — G0010 ADMIN HEPATITIS B VACCINE: HCPCS | Performed by: STUDENT IN AN ORGANIZED HEALTH CARE EDUCATION/TRAINING PROGRAM

## 2021-07-12 PROCEDURE — 3017F COLORECTAL CA SCREEN DOC REV: CPT | Performed by: STUDENT IN AN ORGANIZED HEALTH CARE EDUCATION/TRAINING PROGRAM

## 2021-07-12 PROCEDURE — 3046F HEMOGLOBIN A1C LEVEL >9.0%: CPT | Performed by: STUDENT IN AN ORGANIZED HEALTH CARE EDUCATION/TRAINING PROGRAM

## 2021-07-12 PROCEDURE — 83036 HEMOGLOBIN GLYCOSYLATED A1C: CPT | Performed by: STUDENT IN AN ORGANIZED HEALTH CARE EDUCATION/TRAINING PROGRAM

## 2021-07-12 PROCEDURE — 1036F TOBACCO NON-USER: CPT | Performed by: STUDENT IN AN ORGANIZED HEALTH CARE EDUCATION/TRAINING PROGRAM

## 2021-07-12 PROCEDURE — 99213 OFFICE O/P EST LOW 20 MIN: CPT | Performed by: STUDENT IN AN ORGANIZED HEALTH CARE EDUCATION/TRAINING PROGRAM

## 2021-07-12 RX ORDER — ROSUVASTATIN CALCIUM 20 MG/1
TABLET, COATED ORAL
Qty: 90 TABLET | Refills: 0 | Status: SHIPPED | OUTPATIENT
Start: 2021-07-12 | End: 2021-08-12 | Stop reason: SDUPTHER

## 2021-07-12 RX ORDER — ACETAMINOPHEN 500 MG
1000 TABLET ORAL EVERY 6 HOURS PRN
Qty: 180 TABLET | Refills: 1 | Status: SHIPPED | OUTPATIENT
Start: 2021-07-12 | End: 2022-04-28 | Stop reason: SDUPTHER

## 2021-07-12 RX ORDER — ASPIRIN 81 MG/1
81 TABLET ORAL DAILY
Qty: 90 TABLET | Refills: 1 | Status: SHIPPED | OUTPATIENT
Start: 2021-07-12 | End: 2021-09-16 | Stop reason: SDUPTHER

## 2021-07-12 RX ORDER — CYCLOBENZAPRINE HCL 10 MG
TABLET ORAL
Qty: 30 TABLET | Refills: 0 | Status: SHIPPED | OUTPATIENT
Start: 2021-07-12 | End: 2021-08-12 | Stop reason: SDUPTHER

## 2021-07-12 RX ORDER — ALLOPURINOL 100 MG/1
100 TABLET ORAL DAILY
Qty: 90 TABLET | Refills: 1 | Status: SHIPPED | OUTPATIENT
Start: 2021-07-12 | End: 2022-01-03

## 2021-07-12 ASSESSMENT — ENCOUNTER SYMPTOMS
COLOR CHANGE: 0
BACK PAIN: 1
SHORTNESS OF BREATH: 0
DIARRHEA: 0
ABDOMINAL PAIN: 0
SINUS PAIN: 0
COUGH: 0
SINUS PRESSURE: 0
WHEEZING: 0
NAUSEA: 0
VOMITING: 0
CONSTIPATION: 0
BLOOD IN STOOL: 0

## 2021-07-12 NOTE — PROGRESS NOTES
(Originally 2/8/2020)    Flu vaccine (1) 09/01/2021    Lipid screen  09/22/2021    Potassium monitoring  02/04/2022    Diabetic foot exam  02/19/2022    A1C test (Diabetic or Prediabetic)  02/19/2022    Annual Wellness Visit (AWV)  05/07/2022    Colon cancer screen fecal DNA test (Cologuard)  02/10/2023    DTaP/Tdap/Td vaccine (2 - Td or Tdap) 02/01/2029    Pneumococcal 0-64 years Vaccine (2 of 2 - PPSV23) 11/19/2034    HIV screen  Completed    Hepatitis A vaccine  Aged Out    Hib vaccine  Aged Out    Meningococcal (ACWY) vaccine  Aged Out

## 2021-07-12 NOTE — PROGRESS NOTES
Subjective:    Kaia Qureshi is a 45 y.o. male with  has a past medical history of Hypertension and Obesity. Presented to the office today for:  Chief Complaint   Patient presents with    Hypertension    Diabetes       HPI  This is a 27-year-old gentleman with a history of hypertension, primary cutaneous lymphoma gout, type 2 diabetes mellitus came into the office for medication refill. Cc: Essential hypertension  Patient is on labetalol, amlodipine, hydralazine and lisinopril and is compliant with all the medication. Per patient he checks his blood pressure on daily basis, the higher reading per patient is mostly 115-120s the low reading is between 80s-90s. Patient blood pressure during the office visit was 147/88. Per patient at this moment he is experiencing back pain because he ran out of Flexeril. Patient at this moment is not endorsing any complaints of blurry vision, chest pain, shortness of breath or abdominal pain. Cc: Gout  Per patient he has 4-5 flareup in a year, last flareup was on 7/10/2021, per patient he took colchicine for 2 days and his flareup resolved. Patient usually flareup is on the left greater toe, patient feels pain and numbness and then he started taking colchicine. Last uric acid was 7.7. Cc: Primary cutaneous lymphoma  Patient has a history of lymphoma on the left side of the cheek, per patient he completed chemotherapy in 2018 and since then he never followed up with them. Per patient there is no new complaint. Review of Systems   Constitutional: Negative for chills and fever. HENT: Negative for congestion, sinus pressure and sinus pain. Respiratory: Negative for cough, shortness of breath and wheezing. Cardiovascular: Negative for chest pain, palpitations and leg swelling. Gastrointestinal: Negative for abdominal pain, blood in stool, constipation, diarrhea, nausea and vomiting.    Genitourinary: Negative for difficulty urinating, flank pain and hematuria. Musculoskeletal: Positive for back pain. Negative for arthralgias and myalgias. Skin: Negative for color change and wound. Neurological: Negative for dizziness, light-headedness and headaches. Psychiatric/Behavioral: Negative for agitation and confusion. The patient has a No family history on file. Objective:    BP (!) 147/88 (Site: Left Upper Arm, Position: Sitting, Cuff Size: Large Adult) Comment: machine  Pulse 83   Temp 97.9 °F (36.6 °C) (Temporal)   Ht 5' 10\" (1.778 m)   Wt (!) 325 lb 6.4 oz (147.6 kg)   BMI 46.69 kg/m²    BP Readings from Last 3 Encounters:   07/12/21 (!) 147/88   05/06/21 (!) 140/84   03/19/21 119/74       Physical Exam  Vitals and nursing note reviewed. Constitutional:       Appearance: Normal appearance. HENT:      Head: Normocephalic and atraumatic. Mouth/Throat:      Mouth: Mucous membranes are moist.   Eyes:      Conjunctiva/sclera: Conjunctivae normal.   Cardiovascular:      Rate and Rhythm: Normal rate and regular rhythm. Pulmonary:      Effort: Pulmonary effort is normal.      Breath sounds: Normal breath sounds. Abdominal:      General: Bowel sounds are normal. There is no distension. Palpations: Abdomen is soft. There is no mass. Tenderness: There is no abdominal tenderness. There is no guarding. Musculoskeletal:      Right lower leg: No edema. Left lower leg: No edema. Neurological:      General: No focal deficit present. Mental Status: He is alert and oriented to person, place, and time.    Psychiatric:         Mood and Affect: Mood normal.         Behavior: Behavior normal.         Lab Results   Component Value Date    WBC 9.2 01/23/2018    HGB 11.7 (L) 01/23/2018    HCT 38.5 (L) 01/23/2018     01/23/2018    CHOL 124 09/22/2020    TRIG 102 09/22/2020    HDL 37 (L) 09/22/2020    ALT 21 02/04/2021    AST 24 02/04/2021     02/04/2021    K 4.6 02/04/2021    CL 99 02/04/2021    CREATININE 1.05 02/04/2021    BUN 15 02/04/2021    CO2 26 02/04/2021    TSH 1.57 05/25/2017    LABA1C 10.6 07/12/2021    LABMICR 196 (H) 02/01/2019     Lab Results   Component Value Date    CALCIUM 10.1 02/04/2021    PHOS 3.1 06/06/2017     Lab Results   Component Value Date    LDLCHOLESTEROL 67 09/22/2020       Assessment and Plan:    1. Chronic bilateral low back pain without sciatica  - acetaminophen (APAP EXTRA STRENGTH) 500 MG tablet; Take 2 tablets by mouth every 6 hours as needed for Pain  Dispense: 180 tablet; Refill: 1    -Patient was advised to not take any NSAIDs and over-the-counter medication as he is on colchicine and lisinopril, both will deteriorated kidney function. Patient is aware and understand the situation. 2. Type 2 diabetes mellitus without complication, without long-term current use of insulin (HCC)  - HbA1c: 10.1, repeat pending.  - Patient was made aware that he has type 2 diabetes mellitus, patient is not endorsing any nocturia and increased frequency and urgency in urination. Patient at this moment was advised to start Metformin/insulin, patient at this moment will get back in 4 weeks to discuss options. 3. 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 - allopurinol (ZYLOPRIM) 100 MG tablet; Take 1 tablet by mouth daily  Dispense: 90 tablet; Refill: 1            Patient was counseled about importance of taking medication which were prescribed at today visit. Patient was also counseled that lifestyle changes including diet, smoking/marijuana and use of less alcohol will benefit their health in long term. All the question asked by the patient were answered in non-medical terms. Patient understands and agree to the plan. Teach back method was used while having the conversation.        Requested Prescriptions     Signed Prescriptions Disp Refills    acetaminophen (APAP EXTRA STRENGTH) 500 MG tablet 180 tablet 1     Sig: Take 2 tablets by mouth every 6 hours as needed for Pain    cyclobenzaprine (FLEXERIL) 10 MG tablet 30 tablet 0     Sig: Take 1 tablet at night    rosuvastatin (CRESTOR) 20 MG tablet 90 tablet 0     Sig: Take 1 pill daily    aspirin EC 81 MG EC tablet 90 tablet 1     Sig: Take 1 tablet by mouth daily    allopurinol (ZYLOPRIM) 100 MG tablet 90 tablet 1     Sig: Take 1 tablet by mouth daily       Medications Discontinued During This Encounter   Medication Reason    diclofenac sodium (VOLTAREN) 1 % GEL LIST CLEANUP    aspirin EC 81 MG EC tablet REORDER    cyclobenzaprine (FLEXERIL) 10 MG tablet REORDER    acetaminophen (APAP EXTRA STRENGTH) 500 MG tablet REORDER    rosuvastatin (CRESTOR) 20 MG tablet REORDER       Min received counseling on the following healthy behaviors: nutrition, exercise and medication adherence    Discussed use,benefit, and side effects of prescribed medications. Barriers to medication compliance addressed. All patient questions answered. Pt voiced understanding. Return in about 4 weeks (around 8/9/2021) for f/u on allopurinol and check uric acid. Disclaimer: Some orall of this note was transcribed using voice-recognition software. This may cause typographical errors occasionally. Although all effort is made to fix these errors, please do not hesitate to contact our office if there Didier Ge concern with the understanding of this note.

## 2021-07-12 NOTE — PATIENT INSTRUCTIONS
Thank you for letting us take care of you today. We hope all your questions were addressed. If a question was overlooked or something else comes to mind after you return home, please contact a member of your Care Team listed below. Your Care Team at [place] [address] is Team #5  Jani Santa MD (Faculty)  Donaldo Darby MD (Resident)  Dennis Paget, MD (Resident)  Shoaib Grimes MD (Resident)  EPHRAIM Mendoza ,MEDARDO RAMÍREZ, MEDARDO Ohara Master ([address] office)  [facility] office)  Debra Ambriz, [address] (Clinical Practice Manager)  Vidya Roberto [facility] (Clinical Pharmacist)       Office phone number: [phone]    If you need to get in right away due to illness, please be advised we have \"Same Day\" appointments available Monday-Friday. Please call us at [phone] option #3 to schedule your \"Same Day\" appointment.

## 2021-07-12 NOTE — PROGRESS NOTES
unspecified cause  allopurinol (ZYLOPRIM) 100 MG tablet   5. Back spasm  cyclobenzaprine (FLEXERIL) 10 MG tablet    . I agree with orders as documented by the resident. More than 25 minutes spent  in face to face encounter with the patient and more than half in counseling. Patient's questions were answered. Patient Voiced understanding to the counseling. Return in about 4 weeks (around 8/9/2021) for f/u on allopurinol and check uric acid.    (GC Modifier)-Dr. Tiara Islas MD\

## 2021-08-05 DIAGNOSIS — Z23 NEED FOR PROPHYLACTIC VACCINATION AND INOCULATION AGAINST VARICELLA: ICD-10-CM

## 2021-08-05 NOTE — TELEPHONE ENCOUNTER
Rohan Request for pending medication. Last Visit Date: 7/12/21  Next Visit Date:  Future Appointments   Date Time Provider Lizette Marques   8/12/2021  8:30 AM Crystal Serrano MD [address] [place] [address] Maintenance   Topic Date Due    Hepatitis C screen  Never done    Diabetic retinal exam  Never done    COVID-19 Vaccine (1) Never done    Shingles Vaccine (2 of 2) 05/03/2021    Creatinine monitoring  02/19/2022 (Originally 2/8/2020)    Flu vaccine (1) 09/01/2021    Lipid screen  09/22/2021    A1C test (Diabetic or Prediabetic)  10/12/2021    Hepatitis B vaccine (3 of 3 - Risk 3-dose series) 11/12/2021    Potassium monitoring  02/04/2022    Diabetic foot exam  02/19/2022    Annual Wellness Visit (AWV)  05/07/2022    Colon cancer screen fecal DNA test (Cologuard)  02/10/2023    DTaP/Tdap/Td vaccine (2 - Td or Tdap) 02/01/2029    Pneumococcal 0-64 years Vaccine (2 of 2 - PPSV23) 11/19/2034    HIV screen  Completed    Hepatitis A vaccine  Aged Out    Hib vaccine  Aged Out    Meningococcal (ACWY) vaccine  Aged Out       Hemoglobin A1C (%)   Date Value   07/12/2021 10.6   02/19/2021 7.7   01/17/2020 5.6             ( goal A1C is < 7)   Microalb/Crt.  Ratio (mcg/mg creat)   Date Value   02/01/2019 196 (H)     LDL Cholesterol (mg/dL)   Date Value   09/22/2020 67       (goal LDL is <100)   AST (U/L)   Date Value   02/04/2021 24     ALT (U/L)   Date Value   02/04/2021 21     BUN (mg/dL)   Date Value   02/04/2021 15     BP Readings from Last 3 Encounters:   07/12/21 (!) 147/88   05/06/21 (!) 140/84   03/19/21 119/74          (goal 120/80)    All Future Testing planned in [facility]  Lab Frequency Next Occurrence   Hepatitis C Antibody Once 02/19/2022       Next Visit Date:  Future Appointments   Date Time Provider Lizette Marques   8/12/2021  8:30 AM Luis Trujillo MD [facility]         Patient Active Problem List:     Essential hypertension     Chronic back pain     Morbid obesity with BMI of 50.0-59.9, adult ([facility])     ZURDO (obstructive sleep apnea)     Gout     CKD (chronic kidney disease)     Mixed hyperlipidemia     Squamous cell carcinoma of face     Type 2 diabetes mellitus without complication, without long-term current use of insulin ([facility]     Need for prophylactic vaccination and inoculation against varicella     Vitamin D deficiency     BMI 50.0-59.9, adult ([facility]

## 2021-08-09 DIAGNOSIS — I10 ESSENTIAL HYPERTENSION: ICD-10-CM

## 2021-08-09 RX ORDER — HYDRALAZINE HYDROCHLORIDE 50 MG/1
TABLET, FILM COATED ORAL
Qty: 120 TABLET | Refills: 0 | Status: SHIPPED | OUTPATIENT
Start: 2021-08-09 | End: 2021-08-12 | Stop reason: SDUPTHER

## 2021-08-09 NOTE — TELEPHONE ENCOUNTER
Hydralazine pending for refill     Health Maintenance   Topic Date Due    Hepatitis C screen  Never done    Diabetic retinal exam  Never done    COVID-19 Vaccine (1) Never done    Shingles Vaccine (2 of 2) 05/03/2021    Creatinine monitoring  02/19/2022 (Originally 2/8/2020)    Flu vaccine (1) 09/01/2021    Lipid screen  09/22/2021    A1C test (Diabetic or Prediabetic)  10/12/2021    Hepatitis B vaccine (3 of 3 - Risk 3-dose series) 11/12/2021    Potassium monitoring  02/04/2022    Diabetic foot exam  02/19/2022    Annual Wellness Visit (AWV)  05/07/2022    Colon cancer screen fecal DNA test (Cologuard)  02/10/2023    DTaP/Tdap/Td vaccine (2 - Td or Tdap) 02/01/2029    Pneumococcal 0-64 years Vaccine (2 of 2 - PPSV23) 11/19/2034    HIV screen  Completed    Hepatitis A vaccine  Aged Out    Hib vaccine  Aged Out    Meningococcal (ACWY) vaccine  Aged Out             (applicable per patient's age: Cancer Screenings, Depression Screening, Fall Risk Screening, Immunizations)    Hemoglobin A1C (%)   Date Value   07/12/2021 10.6   02/19/2021 7.7   01/17/2020 5.6     Microalb/Crt.  Ratio (mcg/mg creat)   Date Value   02/01/2019 196 (H)     LDL Cholesterol (mg/dL)   Date Value   09/22/2020 67     AST (U/L)   Date Value   02/04/2021 24     ALT (U/L)   Date Value   02/04/2021 21     BUN (mg/dL)   Date Value   02/04/2021 15      (goal A1C is < 7)   (goal LDL is <100) need 30-50% reduction from baseline     BP Readings from Last 3 Encounters:   07/12/21 (!) 147/88   05/06/21 (!) 140/84   03/19/21 119/74    (goal /80)      All Future Testing planned in [facility]:  Lab Frequency Next Occurrence   Hepatitis C Antibody Once 02/19/2022       Next Visit Date:  Future Appointments   Date Time Provider Lizette Marques   8/12/2021  8:30 AM Luis Mendes MD [address] [facility]            Patient Active Problem List:     Essential hypertension     Chronic back pain     Morbid obesity with BMI of 50.0-59.9, adult ([facility]     ZURDO (obstructive sleep apnea)     Gout     CKD (chronic kidney disease)     Mixed hyperlipidemia     Squamous cell carcinoma of face     Type 2 diabetes mellitus without complication, without long-term current use of insulin ([facility]     Need for prophylactic vaccination and inoculation against varicella     Vitamin D deficiency     BMI 50.0-59.9, adult ([facility]

## 2021-08-12 ENCOUNTER — OFFICE VISIT (OUTPATIENT)
Dept: FAMILY MEDICINE CLINIC | Age: 51
End: 2021-08-12
Payer: MEDICARE

## 2021-08-12 ENCOUNTER — HOSPITAL ENCOUNTER (OUTPATIENT)
Age: 51
Setting detail: SPECIMEN
Discharge: HOME | End: 2021-08-12
Payer: MEDICARE

## 2021-08-12 VITALS
DIASTOLIC BLOOD PRESSURE: 89 MMHG | SYSTOLIC BLOOD PRESSURE: 139 MMHG | HEART RATE: 81 BPM | TEMPERATURE: 98.1 F | BODY MASS INDEX: 45.1 KG/M2 | WEIGHT: 315 LBS | HEIGHT: 70 IN

## 2021-08-12 DIAGNOSIS — E11.65 TYPE 2 DIABETES MELLITUS WITH HYPERGLYCEMIA, WITHOUT LONG-TERM CURRENT USE OF INSULIN (HCC): ICD-10-CM

## 2021-08-12 DIAGNOSIS — Z11.59 ENCOUNTER FOR HEPATITIS C SCREENING TEST FOR LOW RISK PATIENT: ICD-10-CM

## 2021-08-12 DIAGNOSIS — I10 ESSENTIAL HYPERTENSION: ICD-10-CM

## 2021-08-12 DIAGNOSIS — E78.2 MIXED HYPERLIPIDEMIA: ICD-10-CM

## 2021-08-12 DIAGNOSIS — E11.9 TYPE 2 DIABETES MELLITUS WITHOUT COMPLICATION, WITHOUT LONG-TERM CURRENT USE OF INSULIN (HCC): Primary | ICD-10-CM

## 2021-08-12 DIAGNOSIS — E11.9 TYPE 2 DIABETES MELLITUS WITHOUT COMPLICATION, WITHOUT LONG-TERM CURRENT USE OF INSULIN (HCC): ICD-10-CM

## 2021-08-12 DIAGNOSIS — M62.830 BACK SPASM: ICD-10-CM

## 2021-08-12 PROBLEM — E11.22 TYPE 2 DIABETES MELLITUS WITH CHRONIC KIDNEY DISEASE (HCC): Status: ACTIVE | Noted: 2021-08-12

## 2021-08-12 LAB
C-PEPTIDE: 3.6 NG/ML (ref 1.1–4.4)
CREATININE URINE: 136.7 MG/DL (ref 39–259)
HEPATITIS C ANTIBODY: NONREACTIVE
MICROALBUMIN/CREAT 24H UR: 1228 MG/L
MICROALBUMIN/CREAT UR-RTO: 898 MCG/MG CREAT

## 2021-08-12 PROCEDURE — 2022F DILAT RTA XM EVC RTNOPTHY: CPT | Performed by: STUDENT IN AN ORGANIZED HEALTH CARE EDUCATION/TRAINING PROGRAM

## 2021-08-12 PROCEDURE — 1036F TOBACCO NON-USER: CPT | Performed by: STUDENT IN AN ORGANIZED HEALTH CARE EDUCATION/TRAINING PROGRAM

## 2021-08-12 PROCEDURE — G8417 CALC BMI ABV UP PARAM F/U: HCPCS | Performed by: STUDENT IN AN ORGANIZED HEALTH CARE EDUCATION/TRAINING PROGRAM

## 2021-08-12 PROCEDURE — 3017F COLORECTAL CA SCREEN DOC REV: CPT | Performed by: STUDENT IN AN ORGANIZED HEALTH CARE EDUCATION/TRAINING PROGRAM

## 2021-08-12 PROCEDURE — G8427 DOCREV CUR MEDS BY ELIG CLIN: HCPCS | Performed by: STUDENT IN AN ORGANIZED HEALTH CARE EDUCATION/TRAINING PROGRAM

## 2021-08-12 PROCEDURE — 3046F HEMOGLOBIN A1C LEVEL >9.0%: CPT | Performed by: STUDENT IN AN ORGANIZED HEALTH CARE EDUCATION/TRAINING PROGRAM

## 2021-08-12 PROCEDURE — 99213 OFFICE O/P EST LOW 20 MIN: CPT | Performed by: STUDENT IN AN ORGANIZED HEALTH CARE EDUCATION/TRAINING PROGRAM

## 2021-08-12 RX ORDER — CANAGLIFLOZIN 300 MG/1
300 TABLET, FILM COATED ORAL
Qty: 90 TABLET | Refills: 5 | Status: CANCELLED | OUTPATIENT
Start: 2021-08-12

## 2021-08-12 RX ORDER — HYDRALAZINE HYDROCHLORIDE 50 MG/1
TABLET, FILM COATED ORAL
Qty: 120 TABLET | Refills: 5 | Status: SHIPPED | OUTPATIENT
Start: 2021-08-12 | End: 2021-12-23

## 2021-08-12 RX ORDER — LANCETS 30 GAUGE
1 EACH MISCELLANEOUS 3 TIMES DAILY
Qty: 600 EACH | Refills: 1 | Status: SHIPPED | OUTPATIENT
Start: 2021-08-12

## 2021-08-12 RX ORDER — LABETALOL 300 MG/1
TABLET, FILM COATED ORAL
Qty: 240 TABLET | Refills: 5 | Status: SHIPPED | OUTPATIENT
Start: 2021-08-12 | End: 2021-09-16 | Stop reason: SDUPTHER

## 2021-08-12 RX ORDER — CYCLOBENZAPRINE HCL 10 MG
TABLET ORAL
Qty: 30 TABLET | Refills: 3 | Status: SHIPPED | OUTPATIENT
Start: 2021-08-12 | End: 2022-01-06

## 2021-08-12 RX ORDER — ROSUVASTATIN CALCIUM 20 MG/1
TABLET, COATED ORAL
Qty: 90 TABLET | Refills: 5 | Status: SHIPPED | OUTPATIENT
Start: 2021-08-12 | End: 2022-01-06 | Stop reason: ALTCHOICE

## 2021-08-12 RX ORDER — AMLODIPINE BESYLATE 10 MG/1
10 TABLET ORAL NIGHTLY
Qty: 90 TABLET | Refills: 5 | Status: SHIPPED | OUTPATIENT
Start: 2021-08-12 | End: 2022-04-20 | Stop reason: SDUPTHER

## 2021-08-12 RX ORDER — LISINOPRIL 40 MG/1
40 TABLET ORAL DAILY
Qty: 30 TABLET | Refills: 5 | Status: SHIPPED | OUTPATIENT
Start: 2021-08-12 | End: 2021-12-23 | Stop reason: ALTCHOICE

## 2021-08-12 ASSESSMENT — ENCOUNTER SYMPTOMS
SHORTNESS OF BREATH: 0
DIARRHEA: 0
COUGH: 0
BACK PAIN: 0
NAUSEA: 0
ABDOMINAL PAIN: 0
COLOR CHANGE: 0
CONSTIPATION: 0
SINUS PAIN: 0
WHEEZING: 0
BLOOD IN STOOL: 0
SINUS PRESSURE: 0
VOMITING: 0

## 2021-08-12 NOTE — PROGRESS NOTES
polyuria. Genitourinary: Positive for frequency. Negative for difficulty urinating, flank pain, hematuria and urgency. Musculoskeletal: Negative for arthralgias, back pain and myalgias. Skin: Negative for color change and wound. Neurological: Negative for dizziness, light-headedness and headaches. Psychiatric/Behavioral: Negative for agitation and confusion. The patient has a No family history on file. Objective:    /89 (Site: Right Upper Arm, Position: Sitting, Cuff Size: Large Adult)   Pulse 81   Temp 98.1 °F (36.7 °C) (Temporal)   Ht 5' 10\" (1.778 m)   Wt (!) 323 lb 9.6 oz (146.8 kg)   BMI 46.43 kg/m²    BP Readings from Last 3 Encounters:   08/12/21 139/89   07/12/21 (!) 147/88   05/06/21 (!) 140/84       Physical Exam    Lab Results   Component Value Date    WBC 9.2 01/23/2018    HGB 11.7 (L) 01/23/2018    HCT 38.5 (L) 01/23/2018     01/23/2018    CHOL 124 09/22/2020    TRIG 102 09/22/2020    HDL 37 (L) 09/22/2020    ALT 21 02/04/2021    AST 24 02/04/2021     02/04/2021    K 4.6 02/04/2021    CL 99 02/04/2021    CREATININE 1.05 02/04/2021    BUN 15 02/04/2021    CO2 26 02/04/2021    TSH 1.57 05/25/2017    LABA1C 10.6 07/12/2021    LABMICR 196 (H) 02/01/2019     Lab Results   Component Value Date    CALCIUM 10.1 02/04/2021    PHOS 3.1 06/06/2017     Lab Results   Component Value Date    LDLCHOLESTEROL 67 09/22/2020       Assessment and Plan:    1. Essential hypertension  -BP: 139/89  - hydrALAZINE (APRESOLINE) 50 MG tablet; take 1 tablet by mouth four times a day  Dispense: 120 tablet; Refill: 5  - labetalol (NORMODYNE) 300 MG tablet; Take one tablet two times a day  Dispense: 240 tablet; Refill: 5  - lisinopril (PRINIVIL;ZESTRIL) 40 MG tablet; Take 1 tablet by mouth daily  Dispense: 30 tablet; Refill: 5  - amLODIPine (NORVASC) 10 MG tablet; Take 1 tablet by mouth nightly  Dispense: 90 tablet; Refill: 5  - Microalbumin, Ur; Future    2.  Mixed hyperlipidemia  - less alcohol will benefit their health in long term. All the question asked by the patient were answered in non-medical terms. Patient understands and agree to the plan. Teach back method was used while having the conversation. Requested Prescriptions     Signed Prescriptions Disp Refills    hydrALAZINE (APRESOLINE) 50 MG tablet 120 tablet 5     Sig: take 1 tablet by mouth four times a day    labetalol (NORMODYNE) 300 MG tablet 240 tablet 5     Sig: Take one tablet two times a day    rosuvastatin (CRESTOR) 20 MG tablet 90 tablet 5     Sig: Take 1 pill daily    cyclobenzaprine (FLEXERIL) 10 MG tablet 30 tablet 3     Sig: Take 1 tablet at night    lisinopril (PRINIVIL;ZESTRIL) 40 MG tablet 30 tablet 5     Sig: Take 1 tablet by mouth daily    amLODIPine (NORVASC) 10 MG tablet 90 tablet 5     Sig: Take 1 tablet by mouth nightly    canagliflozin-metFORMIN HCl (INVOKAMET)  MG 60 tablet 3     Sig: Take 1 tablet by mouth 2 times daily (with meals)    blood glucose monitor kit and supplies 1 kit 0     Sig: Dispense sufficient amount for indicated testing frequency plus additional to accommodate PRN testing needs. Dispense all needed supplies to include: monitor, strips, lancing device, lancets, control solutions, alcohol swabs.     Lancets MISC 600 each 1     Si each by Does not apply route 3 times daily       Medications Discontinued During This Encounter   Medication Reason    colchicine (COLCRYS) 0.6 MG tablet Therapy completed    labetalol (NORMODYNE) 300 MG tablet REORDER    lisinopril (PRINIVIL;ZESTRIL) 40 MG tablet REORDER    amLODIPine (NORVASC) 10 MG tablet REORDER    cyclobenzaprine (FLEXERIL) 10 MG tablet REORDER    rosuvastatin (CRESTOR) 20 MG tablet REORDER    hydrALAZINE (APRESOLINE) 50 MG tablet REORDER       Min received counseling on the following healthy behaviors: nutrition, exercise and medication adherence    Discussed use,benefit, and side effects of prescribed medications. Barriers to medication compliance addressed. All patient questions answered. Pt voiced understanding. Return in about 4 weeks (around 9/9/2021) for DM. Disclaimer: Some orall of this note was transcribed using voice-recognition software. This may cause typographical errors occasionally. Although all effort is made to fix these errors, please do not hesitate to contact our office if there Feliberto Lewis concern with the understanding of this note.

## 2021-08-12 NOTE — PROGRESS NOTES
Visit Information    Have you changed or started any medications since your last visit including any over-the-counter medicines, vitamins, or herbal medicines? no   Have you stopped taking any of your medications? Is so, why? -  no  Are you having any side effects from any of your medications? - no    Have you seen any other physician or provider since your last visit?  no   Have you had any other diagnostic tests since your last visit?  no   Have you been seen in the emergency room and/or had an admission in a hospital since we last saw you?  no   Have you had your routine dental cleaning in the past 6 months?  no     Do you have an active MyChart account? If no, what is the barrier?   Yes    Patient Care Team:  Lesa Jim MD as PCP - General (Emergency Medicine)  Nata Tomlinson MD as PCP - [facility]    Medical History Review  Past Medical, Family, and Social History reviewed and does not contribute to the patient presenting condition    Health Maintenance   Topic Date Due    Hepatitis C screen  Never done    Diabetic retinal exam  Never done    COVID-19 Vaccine (1) Never done    Shingles Vaccine (2 of 2) 05/03/2021    Creatinine monitoring  02/19/2022 (Originally 2/8/2020)    Flu vaccine (1) 09/01/2021    Lipid screen  09/22/2021    A1C test (Diabetic or Prediabetic)  10/12/2021    Hepatitis B vaccine (3 of 3 - Risk 3-dose series) 11/12/2021    Potassium monitoring  02/04/2022    Diabetic foot exam  02/19/2022    Annual Wellness Visit (AWV)  05/07/2022    Colon cancer screen fecal DNA test (Cologuard)  02/10/2023    DTaP/Tdap/Td vaccine (2 - Td or Tdap) 02/01/2029    Pneumococcal 0-64 years Vaccine (2 of 2 - PPSV23) 11/19/2034    HIV screen  Completed    Hepatitis A vaccine  Aged Out    Hib vaccine  Aged Out    Meningococcal (ACWY) vaccine  Aged Out

## 2021-08-12 NOTE — PATIENT INSTRUCTIONS
Thank you for letting us take care of you today. We hope all your questions were addressed. If a question was overlooked or something else comes to mind after you return home, please contact a member of your Care Team listed below. Your Care Team at [place] [address] is Team #5  Ravindra Torres MD (Faculty)  Kacey Antunez MD (Resident)  Jane Herbert MD (Resident)  Odell Dinh MD (Resident)  Claude Lagos, MD (Resident)  Oniel Morton., MEDARDO Freitas., CORAZON Iraheta., Yanni Mathew., [facility] office)  Mario Junior, [address] (Clinical Practice Manager)  Jake Sutherland [facility] (Clinical Pharmacist)       Office phone number: [phone]    If you need to get in right away due to illness, please be advised we have \"Same Day\" appointments available Monday-Friday. Please call us at [phone] option #3 to schedule your \"Same Day\" appointment. Patient Education        Noninsulin Medicines for Type 2 Diabetes: Care Instructions  Overview     There are different types of noninsulin medicines for diabetes. Each works in a different way. But they all help you control your blood sugar. Some types help your body make insulin to lower your blood sugar. Others lower how much insulin your body needs. Some can slow how fast your body digests sugars. And some can remove extra glucose through your urine. You may need to take more than one medicine for diabetes. Two or more medicines may work better to lower your blood sugar level than just one does. · Metformin. This lowers how much glucose your liver makes. And it helps you respond better to insulin. It also lowers the amount of stored sugar that your liver releases when you are not eating. · Sulfonylureas. These help your body release more insulin. Some work for many hours. They can cause low blood sugar if you don't eat as you planned. An example is glipizide. · Thiazolidinediones. These reduce the amount of blood glucose.  They also help you respond better to insulin. An example is pioglitazone. · SGLT2 inhibitors. These help to remove extra glucose through your urine. They may also help some people lose weight. An example is ertugliflozin. · DPP-4 inhibitors. These help your body raise the level of insulin after you eat. They also help your body make less of a hormone that raises blood sugar. An example is alogliptin. · GLP-1 receptor agonists. These help your body make a protein that can raise your insulin level and make you less hungry. They're given as shots or pills. An example is semaglutide. · Meglitinides. These help your body release insulin. They also help slow how your body digests sugars. So they can keep your blood sugar from rising too fast after you eat. · Alpha-glucosidase inhibitors. These keep starches from breaking down. This means that they lower the amount of glucose absorbed when you eat. They don't help your body make more insulin. So they will not cause low blood sugar unless you use them with other medicines for diabetes. Follow-up care is a key part of your treatment and safety. Be sure to make and go to all appointments, and call your doctor if you are having problems. It's also a good idea to know your test results and keep a list of the medicines you take. How can you care for yourself at home? · Eat a healthy diet. Get some exercise each day. This may help you to reduce how much medicine you need. · Do not take other prescription or over-the-counter medicines, vitamins, herbal products, or supplements without talking to your doctor first. Some medicines for type 2 diabetes can cause problems with other medicines or supplements. · Tell your doctor if you plan to get pregnant. Some of these drugs are not safe for pregnant women. · Be safe with medicines. Take your medicines exactly as prescribed. Meglitinides and sulfonylureas can cause your blood sugar to drop very low.  Call your doctor if you think you are having a problem with your medicine. · Check your blood sugar often. You can use a glucose monitor. Keeping track can help you know how certain foods, activities, and medicines affect your blood sugar. And it can help you keep your blood sugar from getting so low that it's not safe. When should you call for help? Call 911 anytime you think you may need emergency care. For example, call if:    · You passed out (lost consciousness).     · You are confused or cannot think clearly.     · Your blood sugar is very high or very low. Watch closely for changes in your health, and be sure to contact your doctor if:    · Your blood sugar stays outside the level your doctor set for you.     · You have any problems. Where can you learn more? Go to https://Center [employer] Science.Neuralitic Systems. org and sign in to your Sabesim account. Enter H153 in the Ubookoo box to learn more about \"Noninsulin Medicines for Type 2 Diabetes: Care Instructions. \"     If you do not have an account, please click on the \"Sign Up Now\" link. Current as of: August 31, 2020               Content Version: 12.9  © 6347-2959 [employer]. Care instructions adapted under license by [facility] ([facility]). If you have questions about a medical condition or this instruction, always ask your healthcare professional. [place] [address] any warranty or liability for your use of this information. Patient Education        Learning About Meal Planning for Diabetes  Why plan your meals? Meal planning can be a key part of managing diabetes. Planning meals and snacks with the right balance of carbohydrate, protein, and fat can help you keep your blood sugar at the target level you set with your doctor. You don't have to eat special foods. You can eat what your family eats, including sweets once in a while. But you do have to pay attention to how often you eat and how much you eat of certain foods.   You may want to work with a dietitian or a certified diabetes educator. He or she can give you tips and meal ideas and can answer your questions about meal planning. This health professional can also help you reach a healthy weight if that is one of your goals. What plan is right for you? Your dietitian or diabetes educator may suggest that you start with the plate format or carbohydrate counting. The plate format  The plate format is a simple way to help you manage how you eat. You plan meals by learning how much space each food should take on a plate. Using the plate format helps you spread carbohydrate throughout the day. It can make it easier to keep your blood sugar level within your target range. It also helps you see if you're eating healthy portion sizes. To use the plate format, you put non-starchy vegetables on half your plate. Add meat or meat substitutes on one-quarter of the plate. Put a grain or starchy vegetable (such as brown rice or a potato) on the final quarter of the plate. You can add a small piece of fruit and some low-fat or fat-free milk or yogurt, depending on your carbohydrate goal for each meal.  Here are some tips for using the plate format:  · Make sure that you are not using an oversized plate. A 9-inch plate is best. Many restaurants use larger plates. · Get used to using the plate format at home. Then you can use it when you eat out. · Write down your questions about using the plate format. Talk to your doctor, a dietitian, or a diabetes educator about your concerns. Carbohydrate counting  With carbohydrate counting, you plan meals based on the amount of carbohydrate in each food. Carbohydrate raises blood sugar higher and more quickly than any other nutrient. It is found in desserts, breads and cereals, and fruit. It's also found in starchy vegetables such as potatoes and corn, grains such as rice and pasta, and milk and yogurt.  Spreading carbohydrate throughout the day helps keep your blood sugar levels within your target range. Your daily amount depends on several things, including your weight, how active you are, which diabetes medicines you take, and what your goals are for your blood sugar levels. A registered dietitian or diabetes educator can help you plan how much carbohydrate to include in each meal and snack. A guideline for your daily amount of carbohydrate is:  · 45 to 60 grams at each meal. That's about the same as 3 to 4 carbohydrate servings. · 15 to 20 grams at each snack. That's about the same as 1 carbohydrate serving. The Nutrition Facts label on packaged foods tells you how much carbohydrate is in a serving of the food. First, look at the serving size on the food label. Is that the amount you eat in a serving? All of the nutrition information on a food label is based on that serving size. So if you eat more or less than that, you'll need to adjust the other numbers. Total carbohydrate is the next thing you need to look for on the label. If you count carbohydrate servings, one serving of carbohydrate is 15 grams. For foods that don't come with labels, such as fresh fruits and vegetables, you'll need a guide that lists carbohydrate in these foods. Ask your doctor, dietitian, or diabetes educator about books or other nutrition guides you can use. If you take insulin, you need to know how many grams of carbohydrate are in a meal. This lets you know how much rapid-acting insulin to take before you eat. If you use an insulin pump, you get a constant rate of insulin during the day. So the pump must be programmed at meals to give you extra insulin to cover the rise in blood sugar after meals. When you know how much carbohydrate you will eat, you can take the right amount of insulin. Or, if you always use the same amount of insulin, you need to make sure that you eat the same amount of carbohydrate at meals.   If you need more help to understand carbohydrate counting and food labels, ask your doctor, dietitian, or diabetes educator. How can you plan healthy meals? Here are some tips to get started:  · Plan your meals a week at a time. Don't forget to include snacks too. · Use cookbooks or online recipes to plan several main meals. Plan some quick meals for busy nights. You also can double some recipes that freeze well. Then you can save half for other busy nights when you don't have time to cook. · Make sure you have the ingredients you need for your recipes. If you're running low on basic items, put these items on your shopping list too. · List foods that you use to make breakfasts, lunches, and snacks. List plenty of fruits and vegetables. · Post this list on the refrigerator. Add to it as you think of more things you need. · Take the list to the store to do your weekly shopping. Follow-up care is a key part of your treatment and safety. Be sure to make and go to all appointments, and call your doctor if you are having problems. It's also a good idea to know your test results and keep a list of the medicines you take. Where can you learn more? Go to https://IntenseDebatepepiceweb.Cryptopay. org and sign in to your Minteos account. Enter T913 in the [facility] box to learn more about \"Learning About Meal Planning for Diabetes. \"     If you do not have an account, please click on the \"Sign Up Now\" link. Current as of: August 31, 2020               Content Version: 12.9  © 2431-0309 Healthwise, Incorporated. Care instructions adapted under license by [facility] ([facility]). If you have questions about a medical condition or this instruction, always ask your healthcare professional. [place] [address] any warranty or liability for your use of this information. Patient Education        Learning About Carbohydrate (Carb) Counting and Eating Out When You Have Diabetes  Why plan your meals? Meal planning can be a key part of managing diabetes.  Planning meals and snacks with the right balance of carbohydrate, protein, and fat can help you keep your blood sugar at the target level you set with your doctor. You don't have to eat special foods. You can eat what your family eats, including sweets once in a while. But you do have to pay attention to how often you eat and how much you eat of certain foods. You may want to work with a dietitian or a certified diabetes educator. He or she can give you tips and meal ideas and can answer your questions about meal planning. This health professional can also help you reach a healthy weight if that is one of your goals. What should you know about eating carbs? Managing the amount of carbohydrate (carbs) you eat is an important part of healthy meals when you have diabetes. Carbohydrate is found in many foods. · Learn which foods have carbs. And learn the amounts of carbs in different foods. ? Bread, cereal, pasta, and rice have about 15 grams of carbs in a serving. A serving is 1 slice of bread (1 ounce), ½ cup of cooked cereal, or 1/3 cup of cooked pasta or rice. ? Fruits have 15 grams of carbs in a serving. A serving is 1 small fresh fruit, such as an apple or orange; ½ of a banana; ½ cup of cooked or canned fruit; ½ cup of fruit juice; 1 cup of melon or raspberries; or 2 tablespoons of dried fruit. ? Milk and no-sugar-added yogurt have 15 grams of carbs in a serving. A serving is 1 cup of milk or 2/3 cup of no-sugar-added yogurt. ? Starchy vegetables have 15 grams of carbs in a serving. A serving is ½ cup of mashed potatoes or sweet potato; 1 cup winter squash; ½ of a small baked potato; ½ cup of cooked beans; or ½ cup cooked corn or green peas. · Learn how much carbs to eat each day and at each meal. A dietitian or CDE can teach you how to keep track of the amount of carbs you eat. This is called carbohydrate counting. · If you are not sure how to count carbohydrate grams, use the Plate Method to plan meals.  It is a good, quick way to make sure that you have a balanced meal. It also helps you spread carbs throughout the day. ? Divide your plate by types of foods. Put non-starchy vegetables on half the plate, meat or other protein food on one-quarter of the plate, and a grain or starchy vegetable in the final quarter of the plate. To this you can add a small piece of fruit and 1 cup of milk or yogurt, depending on how many carbs you are supposed to eat at a meal.  · Try to eat about the same amount of carbs at each meal. Do not \"save up\" your daily allowance of carbs to eat at one meal.  · Proteins have very little or no carbs per serving. Examples of proteins are beef, chicken, turkey, fish, eggs, tofu, cheese, cottage cheese, and peanut butter. A serving size of meat is 3 ounces, which is about the size of a deck of cards. Examples of meat substitute serving sizes (equal to 1 ounce of meat) are 1/4 cup of cottage cheese, 1 egg, 1 tablespoon of peanut butter, and ½ cup of tofu. How can you eat out and still eat healthy? · Learn to estimate the serving sizes of foods that have carbohydrate. If you measure food at home, it will be easier to estimate the amount in a serving of restaurant food. · If the meal you order has too much carbohydrate (such as potatoes, corn, or baked beans), ask to have a low-carbohydrate food instead. Ask for a salad or green vegetables. · If you use insulin, check your blood sugar before and after eating out to help you plan how much to eat in the future. · If you eat more carbohydrate at a meal than you had planned, take a walk or do other exercise. This will help lower your blood sugar. What are some tips for eating healthy? · Limit saturated fat, such as the fat from meat and dairy products. This is a healthy choice because people who have diabetes are at higher risk of heart disease. So choose lean cuts of meat and nonfat or low-fat dairy products.  Use olive or canola oil instead of butter or shortening when cooking. · Don't skip meals. Your blood sugar may drop too low if you skip meals and take insulin or certain medicines for diabetes. · Check with your doctor before you drink alcohol. Alcohol can cause your blood sugar to drop too low. Alcohol can also cause a bad reaction if you take certain diabetes medicines. Follow-up care is a key part of your treatment and safety. Be sure to make and go to all appointments, and call your doctor if you are having problems. It's also a good idea to know your test results and keep a list of the medicines you take. Where can you learn more? Go to https://chpepiceweb.Tastemade. org and sign in to your IntroMaps account. Enter H057 in the INTREorg SYSTEMS box to learn more about \"Learning About Carbohydrate (Carb) Counting and Eating Out When You Have Diabetes. \"     If you do not have an account, please click on the \"Sign Up Now\" link. Current as of: August 31, 2020               Content Version: 12.9  © 2006-2021 De Correspondent. Care instructions adapted under license by [facility] ([facility]). If you have questions about a medical condition or this instruction, always ask your healthcare professional. [place] [address] any warranty or liability for your use of this information. Patient Education        Learning About Diabetes and Exercise  Can you exercise if you have diabetes? When you have diabetes, it's important to get regular exercise. This helps control your blood sugar level. You can still play sports, run, ride a bike, swim, and do other activities when you have diabetes. How does exercise help when you have diabetes? Getting regular exercise can help control your blood sugar. Your body turns the food you eat into glucose, a type of sugar. You need this sugar for energy. When you have diabetes, the sugar builds up in your blood. But when you exercise, your body uses sugar.  This helps keep it from building up in your blood and results in lower blood sugar and better control of diabetes. Exercise may help you in other ways too. It can help you reach and stay at a healthy weight. It also helps improve blood pressure and cholesterol, which can reduce the risk of heart disease. Exercise can make you feel stronger and happier. It can help you relax and sleep better. And it can give you confidence in other things you do. Exercising safely when you have diabetes  Before you start a new exercise program, talk to your doctor about how and when to exercise. Some types of exercise can be harmful if your diabetes is causing other problems, such as problems with your feet. Your doctor can tell you what types of exercise are good choices for you. Here are some general safety tips. · Check your blood sugar before and after you exercise. Be careful about what you eat, especially if you take insulin or other medicines for diabetes. · Take steps to avoid blood sugar problems. ? Ask your doctor what blood sugar range is safe for you when you exercise. ? If you take medicine or insulin that lowers blood sugar, check your blood sugar before you exercise. ? If your blood sugar is less than 90 mg/dL, you may need to eat a carbohydrate snack first.  ? Be careful when you exercise if your blood sugar is too high. · Try to exercise at about the same time each day. This may help keep your blood sugar steady. If you want to exercise more, slowly increase how hard or long you exercise. · Have someone with you when you exercise. Or exercise at a gym. You may need help if your blood sugar drops too low. · Keep some quick-sugar food with you. You may get symptoms of low blood sugar during exercise or up to 24 hours later. · Use proper footwear and the right equipment. · Pay attention to your body. If you are used to exercising and notice that you cannot do as much as usual, talk to your doctor.   Follow-up care is a key part of your treatment and safety. Be sure to make and go to all appointments, and call your doctor if you are having problems. It's also a good idea to know your test results and keep a list of the medicines you take. Where can you learn more? Go to https://laci.[place] in Town. org and sign in to your "ITOG[employer] account. Enter X573 in the "M[employer] box to learn more about \"Learning About Diabetes and Exercise. \"     If you do not have an account, please click on the \"Sign Up Now\" link. Current as of: August 31, 2020               Content Version: 12.9  © 2006-2021 [employer]. Care instructions adapted under license by [facility] ([facility]). If you have questions about a medical condition or this instruction, always ask your healthcare professional. [address] any warranty or liability for your use of this information. Patient Education        Diabetes Foot Health: Care Instructions  Your Care Instructions     When you have diabetes, your feet need extra care and attention. Diabetes can damage the nerve endings and blood vessels in your feet, making you less likely to notice when your feet are injured. Diabetes also limits your body's ability to fight infection and get blood to areas that need it. If you get a minor foot injury, it could become an ulcer or a serious infection. With good foot care, you can prevent most of these problems. Caring for your feet can be quick and easy. Most of the care can be done when you are bathing or getting ready for bed. Follow-up care is a key part of your treatment and safety. Be sure to make and go to all appointments, and call your doctor if you are having problems. It's also a good idea to know your test results and keep a list of the medicines you take. How can you care for yourself at home?   · Keep your blood sugar close to normal by watching what and how much you eat, monitoring blood sugar, taking medicines if prescribed, and getting regular exercise. · Do not smoke. Smoking affects blood flow and can make foot problems worse. If you need help quitting, talk to your doctor about stop-smoking programs and medicines. These can increase your chances of quitting for good. · Eat a diet that is low in fats. High fat intake can cause fat to build up in your blood vessels and decrease blood flow. · Inspect your feet daily for blisters, cuts, cracks, or sores. If you cannot see well, use a mirror or have someone help you. · Take care of your feet:  ? Wash your feet every day. Use warm (not hot) water. Check the water temperature with your wrists or other part of your body, not your feet. ? Dry your feet well. Pat them dry. Do not rub the skin on your feet too hard. Dry well between your toes. If the skin on your feet stays moist, bacteria or a fungus can grow, which can lead to infection. ? Keep your skin soft. Use moisturizing skin cream to keep the skin on your feet soft and prevent calluses and cracks. But do not put the cream between your toes, and stop using any cream that causes a rash. ? Clean underneath your toenails carefully. Do not use a sharp object to clean underneath your toenails. Use the blunt end of a nail file or other rounded tool. ? Trim and file your toenails straight across to prevent ingrown toenails. Use a nail clipper, not scissors. Use an emery board to smooth the edges. · Change socks daily. Socks without seams are best, because seams often rub the feet. You can find socks for people with diabetes from specialty catalogs. · Look inside your shoes every day for things like gravel or torn linings, which could cause blisters or sores. · Buy shoes that fit well:  ? Look for shoes that have plenty of space around the toes. This helps prevent bunions and blisters. ? Try on shoes while wearing the kind of socks you will usually wear with the shoes. ? Avoid plastic shoes. They may rub your feet and cause blisters.  Good shoes should be made of materials that are flexible and breathable, such as leather or cloth. ? Break in new shoes slowly by wearing them for no more than an hour a day for several days. Take extra time to check your feet for red areas, blisters, or other problems after you wear new shoes. · Do not go barefoot. Do not wear sandals, and do not wear shoes with very thin soles. Thin soles are easy to puncture. They also do not protect your feet from hot pavement or cold weather. · Have your doctor check your feet during each visit. If you have a foot problem, see your doctor. Do not try to treat an early foot problem at home. Home remedies or treatments that you can buy without a prescription (such as corn removers) can be harmful. · Always get early treatment for foot problems. A minor irritation can lead to a major problem if not properly cared for early. When should you call for help? Call your doctor now or seek immediate medical care if:    · You have a foot sore, an ulcer or break in the skin that is not healing after 4 days, bleeding corns or calluses, or an ingrown toenail.     · You have blue or black areas, which can mean bruising or blood flow problems.     · You have peeling skin or tiny blisters between your toes or cracking or oozing of the skin.     · You have a fever for more than 24 hours and a foot sore.     · You have new numbness or tingling in your feet that does not go away after you move your feet or change positions.     · You have unexplained or unusual swelling of the foot or ankle. Watch closely for changes in your health, and be sure to contact your doctor if:    · You cannot do proper foot care. Where can you learn more? Go to [email or web address] [email or web address].[email or web address] org and sign in to your Chrysallis account. Enter A773 in the BoldIQ box to learn more about \"Diabetes Foot Health: Care Instructions. \"     If you do not have an account, please click on the \"Sign Up Now\" link.  Current as of: 2020               Content Version: 12.9  © 9463-7460 [employer]. Care instructions adapted under license by [facility] ([facility]). If you have questions about a medical condition or this instruction, always ask your healthcare professional. [address] any warranty or liability for your use of this information. Patient Education        Home Blood Sugar Test: About This Test  What is it? A home blood sugar test measures the amount of sugar (glucose) in your blood, using a small device called a blood sugar meter. It's a quick way to test your blood sugar anywhere, at any time. Why is this test done? Testing your blood sugar helps you know if your levels are in your target range. It helps you know when to take action and may help you avoid blood sugar emergencies. Testing also helps you learn how things like exercise, stress, and what you eat can affect your blood sugar. What happens before the test?  The supplies you will need for testing blood sugar include:  · A blood glucose meter. · Testing strips. These are made to be used with a specific model of meter. Make sure the strips haven't . · Sugar control solutions. Some meters require a specific solution. Many new meters are made to operate without a control solution. · Short needles called lancets for pricking your skin. · A pen-sized baugh for the lancet (lancet device). It positions the lancet and controls how deeply it goes into your skin. · Clean cotton balls. These are used to stop the bleeding from the testing site. What happens during the test?  Checking your blood sugar involves pricking your finger, palm, or forearm with a lancet to collect a drop of blood. The blood drop is placed on a test strip, which you insert into the blood glucose meter. The instructions for testing are slightly different for each blood glucose meter model.  Follow the instructions that came with your meter. · Wash your hands with warm, soapy water. Dry them well with a clean towel. You may also use an alcohol wipe to clean your finger or other site. But make sure your hands are dry before the test.  · Insert a clean lancet into the lancet device. · Remove a test strip from the test strip bottle. Replace the lid right away to keep moisture away from the other strips. · Follow the instructions that came with your meter to get it ready. · Use the lancet device to stick the side of your fingertip with the lancet. Do not stick the tip of your finger. Some blood sugar meters use lancet devices that take the blood sample from other sites, such as the palm of the hand or the forearm. But the finger is usually the most accurate place to test blood sugar. · Put a drop of blood on the correct spot on the test strip. · Apply pressure with a clean cotton ball to stop the bleeding. · Follow the directions that came with the meter to get the results. · Write down the results and the time that you tested your blood. Some meters will store the results for you. How long does the test take? The blood glucose meter will show the results of the test in a minute or less. What are the possible results for the test?  The American Diabetes Association (ADA) recommends that you stay within the following blood glucose level ranges. But depending on your health, you and your doctor may set a different range for you. For nonpregnant adults with diabetes  · 80 milligrams per deciliter (mg/dL) to 130 mg/dL before a meal  · Less than 180 mg/dL 1 to 2 hours after a meal  For women who have diabetes and are pregnant  · 95 mg/dL or less before breakfast  · 120 to 140 mg/dL (or lower) 1 to 2 hours after a meal  Where can you learn more? Go to https://laci.MESoft. org and sign in to your Elevate account.  Enter M167 in the [facility] box to learn more about \"Home Blood Sugar Test: About This Test.\"     If you do not have an account, please click on the \"Sign Up Now\" link. Current as of: August 31, 2020               Content Version: 12.9  © 2006-2021 Healthwise, Incorporated. Care instructions adapted under license by [facility] ([facility]). If you have questions about a medical condition or this instruction, always ask your healthcare professional. [address] any warranty or liability for your use of this information.

## 2021-08-14 LAB
INSULIN FREE: 12 UIU/ML (ref 3–19)
INSULIN: 15 UIU/ML (ref 3–19)

## 2021-08-16 DIAGNOSIS — E11.9 TYPE 2 DIABETES MELLITUS WITHOUT COMPLICATION, WITHOUT LONG-TERM CURRENT USE OF INSULIN (HCC): ICD-10-CM

## 2021-08-16 NOTE — TELEPHONE ENCOUNTER
Pharmacy contacting office because PCP is not PECOS certified and the need an attending to send a new script over to the pharmacy for pending medication. Please add a SIG and quantity to script. Message sent to attending for that day which was Dr. Dante Horton.

## 2021-09-11 PROBLEM — Z11.59 ENCOUNTER FOR HEPATITIS C SCREENING TEST FOR LOW RISK PATIENT: Status: RESOLVED | Noted: 2021-08-12 | Resolved: 2021-09-11

## 2021-09-16 ENCOUNTER — OFFICE VISIT (OUTPATIENT)
Dept: FAMILY MEDICINE CLINIC | Age: 51
End: 2021-09-16
Payer: MEDICARE

## 2021-09-16 VITALS
WEIGHT: 315 LBS | TEMPERATURE: 97.7 F | HEIGHT: 70 IN | BODY MASS INDEX: 45.1 KG/M2 | SYSTOLIC BLOOD PRESSURE: 137 MMHG | DIASTOLIC BLOOD PRESSURE: 75 MMHG | HEART RATE: 66 BPM

## 2021-09-16 DIAGNOSIS — E11.65 TYPE 2 DIABETES MELLITUS WITH HYPERGLYCEMIA, WITHOUT LONG-TERM CURRENT USE OF INSULIN (HCC): Primary | ICD-10-CM

## 2021-09-16 DIAGNOSIS — M54.41 CHRONIC MIDLINE LOW BACK PAIN WITH RIGHT-SIDED SCIATICA: ICD-10-CM

## 2021-09-16 DIAGNOSIS — Z13.220 SCREENING FOR HYPERLIPIDEMIA: ICD-10-CM

## 2021-09-16 DIAGNOSIS — G89.29 CHRONIC MIDLINE LOW BACK PAIN WITH RIGHT-SIDED SCIATICA: ICD-10-CM

## 2021-09-16 DIAGNOSIS — I10 ESSENTIAL HYPERTENSION: ICD-10-CM

## 2021-09-16 PROCEDURE — 99211 OFF/OP EST MAY X REQ PHY/QHP: CPT | Performed by: HOSPITALIST

## 2021-09-16 PROCEDURE — 99213 OFFICE O/P EST LOW 20 MIN: CPT | Performed by: STUDENT IN AN ORGANIZED HEALTH CARE EDUCATION/TRAINING PROGRAM

## 2021-09-16 PROCEDURE — 2022F DILAT RTA XM EVC RTNOPTHY: CPT | Performed by: STUDENT IN AN ORGANIZED HEALTH CARE EDUCATION/TRAINING PROGRAM

## 2021-09-16 PROCEDURE — 3046F HEMOGLOBIN A1C LEVEL >9.0%: CPT | Performed by: STUDENT IN AN ORGANIZED HEALTH CARE EDUCATION/TRAINING PROGRAM

## 2021-09-16 PROCEDURE — G8427 DOCREV CUR MEDS BY ELIG CLIN: HCPCS | Performed by: STUDENT IN AN ORGANIZED HEALTH CARE EDUCATION/TRAINING PROGRAM

## 2021-09-16 PROCEDURE — G8417 CALC BMI ABV UP PARAM F/U: HCPCS | Performed by: STUDENT IN AN ORGANIZED HEALTH CARE EDUCATION/TRAINING PROGRAM

## 2021-09-16 PROCEDURE — 1036F TOBACCO NON-USER: CPT | Performed by: STUDENT IN AN ORGANIZED HEALTH CARE EDUCATION/TRAINING PROGRAM

## 2021-09-16 PROCEDURE — 3017F COLORECTAL CA SCREEN DOC REV: CPT | Performed by: STUDENT IN AN ORGANIZED HEALTH CARE EDUCATION/TRAINING PROGRAM

## 2021-09-16 RX ORDER — LABETALOL 300 MG/1
TABLET, FILM COATED ORAL
Qty: 240 TABLET | Refills: 5 | Status: SHIPPED | OUTPATIENT
Start: 2021-09-16 | End: 2021-12-23 | Stop reason: ALTCHOICE

## 2021-09-16 RX ORDER — ASPIRIN 81 MG/1
81 TABLET ORAL DAILY
Qty: 90 TABLET | Refills: 1 | Status: SHIPPED | OUTPATIENT
Start: 2021-09-16 | End: 2022-04-20 | Stop reason: SDUPTHER

## 2021-09-16 RX ORDER — BACLOFEN 10 MG/1
10 TABLET ORAL NIGHTLY
Qty: 30 TABLET | Refills: 0 | Status: SHIPPED | OUTPATIENT
Start: 2021-09-16 | End: 2021-11-15

## 2021-09-16 RX ORDER — LIDOCAINE 4 G/G
1 PATCH TOPICAL DAILY
Qty: 30 PATCH | Refills: 0 | Status: SHIPPED | OUTPATIENT
Start: 2021-09-16 | End: 2021-10-16

## 2021-09-16 ASSESSMENT — ENCOUNTER SYMPTOMS
SINUS PAIN: 0
COUGH: 0
BACK PAIN: 0
WHEEZING: 0
SHORTNESS OF BREATH: 0
DIARRHEA: 0
CONSTIPATION: 0
NAUSEA: 0
VOMITING: 0
COLOR CHANGE: 0
SINUS PRESSURE: 0
BLOOD IN STOOL: 0
ABDOMINAL PAIN: 0

## 2021-09-16 NOTE — PATIENT INSTRUCTIONS
Thank you for letting us take care of you today. We hope all your questions were addressed. If a question was overlooked or something else comes to mind after you return home, please contact a member of your Care Team listed below. Your Care Team at [place] [address] is Team #5  Asaf Galarza MD (Faculty)  Jennie Salgado MD (Resident)  Komal Haynes MD (Resident)  Betty Murillo MD (Resident)  Wilver Horn MD (Resident)  Sanjay Oleary., MEDARDO Avina, CORAZON Fox., Mikayla Bernal., [facility] office)  Beba Luna, [address] (Clinical Practice Manager)  Rk Chun, [address] [place] [address] (Clinical Pharmacist)       Office phone number: [phone]    If you need to get in right away due to illness, please be advised we have \"Same Day\" appointments available Monday-Friday. Please call us at [phone] option #3 to schedule your \"Same Day\" appointment.

## 2021-09-16 NOTE — PROGRESS NOTES
patch    diclofenac sodium (VOLTAREN) 1 % GEL    baclofen (LIORESAL) 10 MG tablet     I agree with  orders as documented by the resident. Recommendations:   Patient was counseled, regimen reviewed, consideration for insulin therapy if not improved. Follow up scheduled to recheck Hgb A1C and decide on further course of care. BP is controlled on current regimen. Return in about 2 months (around 11/16/2021) for DM, HTN.    (Balaji Buckley ) Dr. Pia Rahman MD

## 2021-09-16 NOTE — PROGRESS NOTES
DIABETES and HYPERTENSION visit    BP Readings from Last 3 Encounters:   08/12/21 139/89   07/12/21 (!) 147/88   05/06/21 (!) 140/84        Hemoglobin A1C (%)   Date Value   07/12/2021 10.6   02/19/2021 7.7   01/17/2020 5.6     Microalb/Crt. Ratio (mcg/mg creat)   Date Value   08/12/2021 898 (H)     LDL Cholesterol (mg/dL)   Date Value   09/22/2020 67     HDL (mg/dL)   Date Value   09/22/2020 37 (L)     BUN (mg/dL)   Date Value   02/04/2021 15     CREATININE (mg/dL)   Date Value   02/04/2021 1.05     Glucose (mg/dL)   Date Value   02/04/2021 192 (H)   06/06/2017 222 (H)            Have you changed or started any medications since your last visit including any over-the-counter medicines, vitamins, or herbal medicines? no   Have you stopped taking any of your medications? Is so, why? -  no  Are you having any side effects from any of your medications? - no    Have you seen any other physician or provider since your last visit?  no   Have you had any other diagnostic tests since your last visit?  no   Have you been seen in the emergency room and/or had an admission in a hospital since we last saw you?  no   Have you had your routine dental cleaning in the past 6 months?  no     Have you had your annual diabetic retinal (eye) exam? No   (ensure copy of exam is in the chart)    Do you have an active Nexus eWaterhart account? If no, what is the barrier?   Yes    Patient Care Team:  Teri Bailey MD as PCP - General (Emergency Medicine)  Tiffany Shanks MD as PCP - [facility] Provider    Medical History Review  Past Medical, Family, and Social History reviewed and does not contribute to the patient presenting condition    Health Maintenance   Topic Date Due    Diabetic retinal exam  Never done    Shingles Vaccine (2 of 2) 05/03/2021    Flu vaccine (1) Never done    Lipid screen  09/22/2021    Creatinine monitoring  02/19/2022 (Originally 2/8/2020)    A1C test (Diabetic or Prediabetic)  10/12/2021    Hepatitis B vaccine (3 of 3 - Risk 3-dose series) 11/12/2021    Potassium monitoring  02/04/2022    Diabetic foot exam  02/19/2022    Annual Wellness Visit (AWV)  05/07/2022    Colon cancer screen fecal DNA test (Cologuard)  02/10/2023    DTaP/Tdap/Td vaccine (2 - Td or Tdap) 02/01/2029    Pneumococcal 0-64 years Vaccine (2 of 2 - PPSV23) 11/19/2034    COVID-19 Vaccine  Completed    Hepatitis C screen  Completed    HIV screen  Completed    Hepatitis A vaccine  Aged Out    Hib vaccine  Aged Out    Meningococcal (ACWY) vaccine  Aged Out

## 2021-09-16 NOTE — PROGRESS NOTES
Subjective:    Anatoly Reveles is a 45 y.o. male with  has a past medical history of Hypertension and Obesity. Presented to the office today for:  Chief Complaint   Patient presents with    1 Month Follow-Up     DM    Health Maintenance     pt declined A1C due to waiver. no DM eye exam, had 2nd covid vaccine on 9-10 will discuss other vaccines next visit        HPI  This is a 48-year-old gentleman with a history of obesity, hypertension and newly diagnosed diabetes came to the office today for follow-up on diabetes. Cc: Newly diagnosed diabetes mellitus  Patient previously HbA1c was 10.1. Previously was discussed with the patient to start insulin since HbA1c was high, patient at that time wanted to do dietary measures modifications and physical activities. Patient started on combination of canagliflozin and Metformin. Patient is compliant with the medication, patient endorses glucose running between 150-180s. Patient did not have any hospitalization for DKA. Patient is compliant with medications. Review of Systems   Constitutional: Negative for chills and fever. HENT: Negative for congestion, sinus pressure and sinus pain. Respiratory: Negative for cough, shortness of breath and wheezing. Cardiovascular: Negative for chest pain, palpitations and leg swelling. Gastrointestinal: Negative for abdominal pain, blood in stool, constipation, diarrhea, nausea and vomiting. Genitourinary: Negative for difficulty urinating, flank pain and hematuria. Musculoskeletal: Negative for arthralgias, back pain and myalgias. Skin: Negative for color change and wound. Neurological: Negative for dizziness, light-headedness and headaches. Psychiatric/Behavioral: Negative for agitation and confusion. The patient has a No family history on file.     Objective:    /75 (Site: Left Upper Arm, Position: Sitting, Cuff Size: Large Adult)   Pulse 66   Temp 97.7 °F (36.5 °C) (Temporal)   Ht 5' 10\" (1.778 m)   Wt (!) 318 lb 9.6 oz (144.5 kg)   BMI 45.71 kg/m²    BP Readings from Last 3 Encounters:   09/16/21 137/75   08/12/21 139/89   07/12/21 (!) 147/88       Physical Exam  Vitals and nursing note reviewed. Constitutional:       Appearance: Normal appearance. HENT:      Head: Normocephalic and atraumatic. Mouth/Throat:      Mouth: Mucous membranes are moist.   Eyes:      Conjunctiva/sclera: Conjunctivae normal.   Cardiovascular:      Rate and Rhythm: Normal rate and regular rhythm. Pulmonary:      Effort: Pulmonary effort is normal.      Breath sounds: Normal breath sounds. Abdominal:      General: Bowel sounds are normal. There is no distension. Palpations: Abdomen is soft. There is no mass. Tenderness: There is no abdominal tenderness. There is no guarding. Musculoskeletal:      Right lower leg: No edema. Left lower leg: No edema. Skin:     Findings: No erythema. Neurological:      General: No focal deficit present. Mental Status: He is alert and oriented to person, place, and time. Psychiatric:         Mood and Affect: Mood normal.         Behavior: Behavior normal.         Lab Results   Component Value Date    WBC 9.2 01/23/2018    HGB 11.7 (L) 01/23/2018    HCT 38.5 (L) 01/23/2018     01/23/2018    CHOL 124 09/22/2020    TRIG 102 09/22/2020    HDL 37 (L) 09/22/2020    ALT 21 02/04/2021    AST 24 02/04/2021     02/04/2021    K 4.6 02/04/2021    CL 99 02/04/2021    CREATININE 1.05 02/04/2021    BUN 15 02/04/2021    CO2 26 02/04/2021    TSH 1.57 05/25/2017    LABA1C 10.6 07/12/2021    LABMICR 898 (H) 08/12/2021     Lab Results   Component Value Date    CALCIUM 10.1 02/04/2021    PHOS 3.1 06/06/2017     Lab Results   Component Value Date    LDLCHOLESTEROL 67 09/22/2020       Assessment and Plan:    1. Essential hypertension  -BP: 137/75  -Adequately controlled with the current regimen, medication refilled during this visit.   - labetalol (NORMODYNE) 300 MG tablet; Take one tablet two times a day  Dispense: 240 tablet; Refill: 5    2. Type 2 diabetes mellitus without complication, without long-term current use of insulin ([facility])  -HbA1c: 10.6, repeat HbA1c is printed since patient underwent testing 4 weeks ago, patient was counseled that if the co-pay is lower and he can afford it he should undergo another test to evaluate the the management. Patient understand and agree with the plan. Patient was also counseled to reconsider initiating insulin, patient at this time wants to continue with the current regimen. If the HbA1c is trending down we will add medication as needed. 3. Chronic midline low back pain with right-sided sciatica  - Patient has chronic musculoskeletal pain, will continue lidocaine patches with gel and muscle relaxant.  - lidocaine 4 % external patch; Place 1 patch onto the skin daily  Dispense: 30 patch; Refill: 0  - diclofenac sodium (VOLTAREN) 1 % GEL; Apply 4 g topically 4 times daily  Dispense: 50 g; Refill: 5        Patient was counseled about importance of taking medication which were prescribed at today visit. Patient was also counseled that lifestyle changes including diet, smoking and use of less alcohol will benefit their health in long term. All the question asked by the patient were answered in non-medical terms. Patient understands and agree to the plan. Teach back method was used while having the conversation.            Requested Prescriptions     Signed Prescriptions Disp Refills    labetalol (NORMODYNE) 300 MG tablet 240 tablet 5     Sig: Take one tablet two times a day    aspirin EC 81 MG EC tablet 90 tablet 1     Sig: Take 1 tablet by mouth daily    lidocaine 4 % external patch 30 patch 0     Sig: Place 1 patch onto the skin daily    diclofenac sodium (VOLTAREN) 1 % GEL 50 g 5     Sig: Apply 4 g topically 4 times daily    baclofen (LIORESAL) 10 MG tablet 30 tablet 0     Sig: Take 1 tablet by mouth nightly       Medications Discontinued During This Encounter   Medication Reason    aspirin EC 81 MG EC tablet REORDER    labetalol (NORMODYNE) 300 MG tablet Magan Ferris received counseling on the following healthy behaviors: nutrition, exercise and medication adherence    Discussed use,benefit, and side effects of prescribed medications. Barriers to medication compliance addressed. All patient questions answered. Pt voiced understanding. Return in about 2 months (around 11/16/2021) for DM, HTN. Disclaimer: Some orall of this note was transcribed using voice-recognition software. This may cause typographical errors occasionally. Although all effort is made to fix these errors, please do not hesitate to contact our office if there Jodee Larsony concern with the understanding of this note.

## 2021-11-13 DIAGNOSIS — M54.41 CHRONIC MIDLINE LOW BACK PAIN WITH RIGHT-SIDED SCIATICA: ICD-10-CM

## 2021-11-13 DIAGNOSIS — G89.29 CHRONIC MIDLINE LOW BACK PAIN WITH RIGHT-SIDED SCIATICA: ICD-10-CM

## 2021-11-15 RX ORDER — BACLOFEN 10 MG/1
10 TABLET ORAL NIGHTLY
Qty: 30 TABLET | Refills: 0 | Status: SHIPPED | OUTPATIENT
Start: 2021-11-15 | End: 2022-04-20 | Stop reason: SDUPTHER

## 2021-11-15 NOTE — TELEPHONE ENCOUNTER
Baclofen pending for refill   Health Maintenance   Topic Date Due    Diabetic retinal exam  Never done    Shingles Vaccine (2 of 2) 05/03/2021    Flu vaccine (1) Never done    Lipid screen  09/22/2021    A1C test (Diabetic or Prediabetic)  10/12/2021    Hepatitis B vaccine (3 of 3 - Risk 3-dose series) 11/12/2021    Creatinine monitoring  02/19/2022 (Originally 2/8/2020)    Potassium monitoring  02/04/2022    Diabetic foot exam  02/19/2022    COVID-19 Vaccine (3 - Booster for Moderna series) 03/10/2022    Annual Wellness Visit (AWV)  05/07/2022    Colon cancer screen fecal DNA test (Cologuard)  02/10/2023    DTaP/Tdap/Td vaccine (2 - Td or Tdap) 02/01/2029    Pneumococcal 0-64 years Vaccine (2 of 2 - PPSV23) 11/19/2034    Hepatitis C screen  Completed    HIV screen  Completed    Hepatitis A vaccine  Aged Out    Hib vaccine  Aged Out    Meningococcal (ACWY) vaccine  Aged Out             (applicable per patient's age: Cancer Screenings, Depression Screening, Fall Risk Screening, Immunizations)    Hemoglobin A1C (%)   Date Value   07/12/2021 10.6   02/19/2021 7.7   01/17/2020 5.6     Microalb/Crt.  Ratio (mcg/mg creat)   Date Value   08/12/2021 898 (H)     LDL Cholesterol (mg/dL)   Date Value   09/22/2020 67     AST (U/L)   Date Value   02/04/2021 24     ALT (U/L)   Date Value   02/04/2021 21     BUN (mg/dL)   Date Value   02/04/2021 15      (goal A1C is < 7)   (goal LDL is <100) need 30-50% reduction from baseline     BP Readings from Last 3 Encounters:   09/16/21 137/75   08/12/21 139/89   07/12/21 (!) 147/88    (goal /80)      All Future Testing planned in [facility]:  Lab Frequency Next Occurrence   Hemoglobin A1C Once 09/16/2022       Next Visit Date:  Future Appointments   Date Time Provider Lizette Marques   11/15/2021 10:00 AM Luis Azar MD [address] [facility]            Patient Active Problem List:     Essential hypertension     Chronic back pain     Morbid obesity with BMI of 50. 0-59.9, adult ([facility]     ZURDO (obstructive sleep apnea)     Gout     CKD (chronic kidney disease)     Mixed hyperlipidemia     Squamous cell carcinoma of face     Type 2 diabetes mellitus without complication, without long-term current use of insulin ([facility]     Need for prophylactic vaccination and inoculation against varicella     Vitamin D deficiency     BMI 50.0-59.9, adult ([facility]     Type 2 diabetes mellitus with hyperglycemia     Type 2 diabetes mellitus with chronic kidney disease     Back spasm

## 2021-11-22 ENCOUNTER — TELEPHONE (OUTPATIENT)
Dept: FAMILY MEDICINE CLINIC | Age: 51
End: 2021-11-22

## 2021-11-22 NOTE — LETTER
Medication Management Service [facility] [place]  [address] [place] [address]           11/22/21     Dear Kathy Chaparro,        I tried to reach you by telephone on 10/05/2021 and 11/08/2021 to schedule an appointment to review your blood glucose readings and medications for diabetes management at the request of Dr. Jah Acevedo. Keeping your blood sugars at a healthy level has been shown to decrease the risk of diabetes complications. Our team of pharmacists is able to work alongside your providers to help you reach your diabetes goals. Please contact me at [phone] so that I can review your blood sugars and determine the next step in your diabetes treatment plan.           Aldo Fuentes, PharmD  PGY2 Ambulatory Care Pharmacy Resident    11/22/2021 4:23 PM

## 2021-11-22 NOTE — TELEPHONE ENCOUNTER
Medication Management Service (MMS)  [facility]  [phone]       Keya Avelar was referred to Medication Management Service by Evita Olson for Type 2 diabetes mellitus management management (patient education/consultation). [facility] has been unable to reach patient after the following attempts:    Initial phone call on: 10/05/2021    Second phone call on: 11/08/2021    Letter sent on: 11/22/2021    At this time, the patient will be discharged from [facility] and the referral will be closed. Medication Management will have no further follow-up at this time, but is available in the future for any further medication needs that may arise. Thank you.          Landon Philippe, Sun  PGY2 Ambulatory Care Pharmacy Resident    11/22/2021 4:17 PM    ===========================================================================  For Pharmacy Admin Tracking Only     CPA in place:  No   Time Spent (min): 30

## 2021-12-23 ENCOUNTER — OFFICE VISIT (OUTPATIENT)
Dept: FAMILY MEDICINE CLINIC | Age: 51
End: 2021-12-23
Payer: MEDICARE

## 2021-12-23 ENCOUNTER — TELEPHONE (OUTPATIENT)
Dept: FAMILY MEDICINE CLINIC | Age: 51
End: 2021-12-23

## 2021-12-23 VITALS
DIASTOLIC BLOOD PRESSURE: 84 MMHG | HEIGHT: 70 IN | SYSTOLIC BLOOD PRESSURE: 142 MMHG | TEMPERATURE: 97 F | HEART RATE: 80 BPM | BODY MASS INDEX: 45.1 KG/M2 | WEIGHT: 315 LBS

## 2021-12-23 DIAGNOSIS — Z13.220 SCREENING FOR HYPERLIPIDEMIA: ICD-10-CM

## 2021-12-23 DIAGNOSIS — I10 ESSENTIAL HYPERTENSION: ICD-10-CM

## 2021-12-23 DIAGNOSIS — E11.65 TYPE 2 DIABETES MELLITUS WITH HYPERGLYCEMIA, WITHOUT LONG-TERM CURRENT USE OF INSULIN (HCC): Primary | ICD-10-CM

## 2021-12-23 LAB — HBA1C MFR BLD: 6 %

## 2021-12-23 PROCEDURE — 3017F COLORECTAL CA SCREEN DOC REV: CPT | Performed by: STUDENT IN AN ORGANIZED HEALTH CARE EDUCATION/TRAINING PROGRAM

## 2021-12-23 PROCEDURE — 90746 HEPB VACCINE 3 DOSE ADULT IM: CPT | Performed by: FAMILY MEDICINE

## 2021-12-23 PROCEDURE — 99213 OFFICE O/P EST LOW 20 MIN: CPT | Performed by: STUDENT IN AN ORGANIZED HEALTH CARE EDUCATION/TRAINING PROGRAM

## 2021-12-23 PROCEDURE — G8427 DOCREV CUR MEDS BY ELIG CLIN: HCPCS | Performed by: STUDENT IN AN ORGANIZED HEALTH CARE EDUCATION/TRAINING PROGRAM

## 2021-12-23 PROCEDURE — 83036 HEMOGLOBIN GLYCOSYLATED A1C: CPT | Performed by: STUDENT IN AN ORGANIZED HEALTH CARE EDUCATION/TRAINING PROGRAM

## 2021-12-23 PROCEDURE — 99211 OFF/OP EST MAY X REQ PHY/QHP: CPT | Performed by: FAMILY MEDICINE

## 2021-12-23 PROCEDURE — G8484 FLU IMMUNIZE NO ADMIN: HCPCS | Performed by: STUDENT IN AN ORGANIZED HEALTH CARE EDUCATION/TRAINING PROGRAM

## 2021-12-23 PROCEDURE — 2022F DILAT RTA XM EVC RTNOPTHY: CPT | Performed by: STUDENT IN AN ORGANIZED HEALTH CARE EDUCATION/TRAINING PROGRAM

## 2021-12-23 PROCEDURE — G8417 CALC BMI ABV UP PARAM F/U: HCPCS | Performed by: STUDENT IN AN ORGANIZED HEALTH CARE EDUCATION/TRAINING PROGRAM

## 2021-12-23 PROCEDURE — 1036F TOBACCO NON-USER: CPT | Performed by: STUDENT IN AN ORGANIZED HEALTH CARE EDUCATION/TRAINING PROGRAM

## 2021-12-23 RX ORDER — CARVEDILOL 12.5 MG/1
12.5 TABLET ORAL 2 TIMES DAILY
Qty: 60 TABLET | Refills: 3 | Status: SHIPPED | OUTPATIENT
Start: 2021-12-23 | End: 2022-04-18

## 2021-12-23 RX ORDER — LISINOPRIL 40 MG/1
40 TABLET ORAL DAILY
Qty: 30 TABLET | Refills: 3 | Status: SHIPPED | OUTPATIENT
Start: 2021-12-23 | End: 2022-04-20 | Stop reason: SDUPTHER

## 2021-12-23 ASSESSMENT — ENCOUNTER SYMPTOMS
VOMITING: 0
CONSTIPATION: 0
SINUS PRESSURE: 0
COUGH: 0
BLOOD IN STOOL: 0
COLOR CHANGE: 0
SINUS PAIN: 0
BACK PAIN: 0
DIARRHEA: 0
SHORTNESS OF BREATH: 0
WHEEZING: 0
NAUSEA: 0
ABDOMINAL PAIN: 0

## 2021-12-23 NOTE — PROGRESS NOTES
Subjective:    Sal Bhat is a 45 y.o. male with  has a past medical history of Hypertension and Obesity. Presented to the office today for:  Chief Complaint   Patient presents with   [place] Maintenance     no DM eye exam        HPI  This is a 76-year-old gentleman with a history of type 2 diabetes mellitus and hypertension came in today for diabetes follow-up. Cc: Type 2 diabetes mellitus  Patient was not able to check blood glucose at home as he lost his kit. Patient HbA1c today of 6 trended down from 10.6. Patient is maintaining good physical exercise, endorses cutting down on her carb control. Patient lost 5 pounds as compared to the previous encounter. Patient is on invokeamet twice daily. Cc: Essential hypertension  Patient blood pressure today is 142/84. Patient was on hydralazine, amlodipine, lisinopril and labetalol. Patient is denying any chest pain, blurry vision, headache, chest pain and abdominal pain at this time    Review of Systems   Constitutional: Negative for chills and fever. HENT: Negative for congestion, sinus pressure and sinus pain. Respiratory: Negative for cough, shortness of breath and wheezing. Cardiovascular: Negative for chest pain, palpitations and leg swelling. Gastrointestinal: Negative for abdominal pain, blood in stool, constipation, diarrhea, nausea and vomiting. Genitourinary: Negative for difficulty urinating, flank pain and hematuria. Musculoskeletal: Negative for arthralgias, back pain and myalgias. Skin: Negative for color change and wound. Neurological: Negative for dizziness, light-headedness and headaches. Psychiatric/Behavioral: Negative for agitation and confusion. The patient has a No family history on file.     Objective:    BP (!) 142/84 (Site: Right Upper Arm, Position: Sitting, Cuff Size: Large Adult) Comment: machine  Pulse 80   Temp 97 °F (36.1 °C) (Temporal)   Ht 5' 10\" (1.778 m) Wt (!) 317 lb (143.8 kg)   BMI 45.48 kg/m²    BP Readings from Last 3 Encounters:   12/23/21 (!) 142/84   09/16/21 137/75   08/12/21 139/89       Physical Exam  Vitals and nursing note reviewed. Constitutional:       Appearance: Normal appearance. HENT:      Head: Normocephalic and atraumatic. Mouth/Throat:      Mouth: Mucous membranes are moist.   Eyes:      Conjunctiva/sclera: Conjunctivae normal.   Cardiovascular:      Rate and Rhythm: Normal rate and regular rhythm. Pulmonary:      Effort: Pulmonary effort is normal.      Breath sounds: Normal breath sounds. Abdominal:      General: Bowel sounds are normal. There is no distension. Palpations: Abdomen is soft. There is no mass. Tenderness: There is no abdominal tenderness. There is no guarding. Musculoskeletal:      Right lower leg: No edema. Left lower leg: No edema. Neurological:      General: No focal deficit present. Mental Status: He is alert and oriented to person, place, and time. Psychiatric:         Mood and Affect: Mood normal.         Behavior: Behavior normal.         Lab Results   Component Value Date    WBC 9.2 01/23/2018    HGB 11.7 (L) 01/23/2018    HCT 38.5 (L) 01/23/2018     01/23/2018    CHOL 124 09/22/2020    TRIG 102 09/22/2020    HDL 37 (L) 09/22/2020    ALT 21 02/04/2021    AST 24 02/04/2021     02/04/2021    K 4.6 02/04/2021    CL 99 02/04/2021    CREATININE 1.05 02/04/2021    BUN 15 02/04/2021    CO2 26 02/04/2021    TSH 1.57 05/25/2017    LABA1C 6.0 12/23/2021    LABMICR 898 (H) 08/12/2021     Lab Results   Component Value Date    CALCIUM 10.1 02/04/2021    PHOS 3.1 06/06/2017     Lab Results   Component Value Date    LDLCHOLESTEROL 67 09/22/2020       Assessment and Plan:      1.  Type 2 diabetes mellitus with hyperglycemia, without long-term current use of insulin (HCC)  -HbA1c: 6 on 5/23/2021.  -We will continue current regimen, will reevaluate in 3 weeks and de-escalate medication if needed.  -Patient does not needed glucose monitoring kit to check his blood sugars at home. 3. Essential hypertension  - BP: 142/84  -We will discontinue labetalol and hydralazine at this time. Hydrochlorothiazide is not an option since patient has a history of gout. We will start Coreg lisinopril and continue amlodipine. We will see the patient in a week and adjust medication appropriately  - carvedilol (COREG) 12.5 MG tablet; Take 1 tablet by mouth 2 times daily  Dispense: 60 tablet; Refill: 3  - lisinopril (PRINIVIL;ZESTRIL) 40 MG tablet; Take 1 tablet by mouth daily  Dispense: 30 tablet; Refill: 3      Patient was counseled about importance of taking medication which were prescribed at today visit. Patient was also counseled that lifestyle changes including diet, smoking and use of less alcohol will benefit their health in long term. All the question asked by the patient were answered in non-medical terms. Patient understands and agree to the plan. Teach back method was used while having the conversation. Requested Prescriptions     Signed Prescriptions Disp Refills    blood glucose monitor kit and supplies 1 kit 0     Sig: Dispense sufficient amount for indicated testing frequency plus additional to accommodate PRN testing needs. Dispense all needed supplies to include: monitor, strips, lancing device, lancets, control solutions, alcohol swabs.     carvedilol (COREG) 12.5 MG tablet 60 tablet 3     Sig: Take 1 tablet by mouth 2 times daily    lisinopril (PRINIVIL;ZESTRIL) 40 MG tablet 30 tablet 3     Sig: Take 1 tablet by mouth daily       Medications Discontinued During This Encounter   Medication Reason    lisinopril (PRINIVIL;ZESTRIL) 40 MG tablet Therapy completed    labetalol (NORMODYNE) 300 MG tablet Therapy completed    hydrALAZINE (APRESOLINE) 50 MG tablet LIST CLEANUP    lisinopril (PRINIVIL;ZESTRIL) 40 MG tablet REORDER       Min received counseling on the following healthy behaviors: nutrition, exercise and medication adherence    Discussed use,benefit, and side effects of prescribed medications. Barriers to medication compliance addressed. All patient questions answered. Pt voiced understanding. Return in about 1 week (around 12/30/2021) for HTN. Disclaimer: Some orall of this note was transcribed using voice-recognition software. This may cause typographical errors occasionally. Although all effort is made to fix these errors, please do not hesitate to contact our office if there Toy Allan concern with the understanding of this note.

## 2021-12-23 NOTE — PROGRESS NOTES
Diabetic visit information    BP Readings from Last 3 Encounters:   09/16/21 137/75   08/12/21 139/89   07/12/21 (!) 147/88       Hemoglobin A1C (%)   Date Value   07/12/2021 10.6   02/19/2021 7.7   01/17/2020 5.6     Microalb/Crt. Ratio (mcg/mg creat)   Date Value   08/12/2021 898 (H)     LDL Cholesterol (mg/dL)   Date Value   09/22/2020 67               Have you changed or started any medications since your last visit including any over-the-counter medicines, vitamins, or herbal medicines? no   Have you stopped taking any of your medications? Is so, why? -  no  Are you having any side effects from any of your medications? - no    Have you seen any other physician or provider since your last visit?  no   Have you had any other diagnostic tests since your last visit?  no   Have you been seen in the emergency room and/or had an admission in a hospital since we last saw you?  no     Have you had your annual diabetic retinal (eye) exam? No   (ensure copy of exam is in the chart)    Have you had your routine dental cleaning in the past 6 months? no    Do you have an active MyChart account? If not, what are your barriers? Yes    Patient Care Team:  Millie Alvarado MD as PCP - General (Emergency Medicine)  Emily Eduardo MD as PCP - [facility] Provider    Medical history Review  Past Medical, Family, and Social History reviewed and does not contribute to the patient presenting condition.     Health Maintenance   Topic Date Due    Diabetic retinal exam  Never done    Shingles Vaccine (2 of 2) 05/03/2021    Flu vaccine (1) Never done    Lipid screen  09/22/2021    A1C test (Diabetic or Prediabetic)  10/12/2021    Hepatitis B vaccine (3 of 3 - Risk 3-dose series) 11/12/2021    Creatinine monitoring  02/19/2022 (Originally 2/8/2020)    Potassium monitoring  02/04/2022    Diabetic foot exam  02/19/2022    COVID-19 Vaccine (3 - Booster for Moderna series) 03/10/2022    Annual Wellness Visit (AWV)  05/07/2022  Colon cancer screen fecal DNA test (Cologuard)  02/10/2023    DTaP/Tdap/Td vaccine (2 - Td or Tdap) 02/01/2029    Pneumococcal 0-64 years Vaccine (2 of 2 - PPSV23) 11/19/2034    Hepatitis C screen  Completed    HIV screen  Completed    Hepatitis A vaccine  Aged Out    Hib vaccine  Aged Out    Meningococcal (ACWY) vaccine  Aged Out

## 2021-12-23 NOTE — TELEPHONE ENCOUNTER
Informed provider that would need to put in new order for Glucose monitor, lancets and test strips all has to be .  Please fax to pharmacy, thank you

## 2021-12-23 NOTE — PATIENT INSTRUCTIONS
Thank you for letting us take care of you today. We hope all your questions were addressed. If a question was overlooked or something else comes to mind after you return home, please contact a member of your Care Team listed below. Your Care Team at [place] [address] is Team #5  Kimberli Timmons MD (Faculty)  Felicitas Mtz MD (Resident)  Chapincito Max MD (Resident)  Rolando Morales MD (Resident)  Mckenna Mccarty MD (Resident)  Carmen Hollis., MEDARDO Jones., CORAZON Griggs., Annette Thompson., Uma Voss [facility] office)  Anisa Hill, [address] (Clinical Practice Manager)  Karina Huerta [facility] (Clinical Pharmacist)       Office phone number: [phone]    If you need to get in right away due to illness, please be advised we have \"Same Day\" appointments available Monday-Friday. Please call us at [phone] option #3 to schedule your \"Same Day\" appointment. Patient Education          Patient Education        hepatitis B adult vaccine  Pronunciation:  HEP a ASMITA tis B a DULT VAX een  Brand:  Engerix-B, Heplisav-B, Recombivax HB Adult, Recombivax HB Dialysis Formulation  What is the most important information I should know about this vaccine? You should not receive hepatitis B vaccine if you are allergic to yeast.  This vaccine will not protect against hepatitis B if you are already infected with the virus, even if you do not yet show symptoms. What is hepatitis B vaccine? Hepatitis B is a serious disease caused by a virus. Hepatitis causes inflammation of the liver, vomiting, and jaundice (yellowing of the skin or eyes). Hepatitis can lead to liver cancer, cirrhosis, or death. Hepatitis B is spread through blood or bodily fluids, sexual contact, and by sharing items such as a razor, toothbrush, or IV drug needle with an infected person. Hepatitis B can also be passed to a baby during childbirth when the mother is infected. The hepatitis B adult vaccine is used to help prevent this disease in adults.  The dialysis form of this vaccine is for adults receiving dialysis. This vaccine helps your body develop immunity to hepatitis B, but it will not treat an active infection you already have. Vaccination with hepatitis B adult vaccine is recommended for all adults who are at risk of getting hepatitis B. Risk factors include: living with someone infected with hepatitis B virus; having more than one sex partner; men who have sex with men; having sexual contact with infected people; having hepatitis C, chronic liver disease, kidney disease, diabetes, HIV or AIDS; being on dialysis; using intravenous (IV) drugs; living or working in a facility for developmentally disabled people; working in healthcare or public safety and being exposed to blood or body fluids; living or working in a correctional facility; being a victim of sexual abuse or assault; and traveling to areas where hepatitis B is common. Like any vaccine, the hepatitis B vaccine may not provide protection from disease in every person. What should I discuss with my healthcare provider before receiving this vaccine? Hepatitis B vaccine will not protect against infection with hepatitis A, C, and E, or other viruses that affect the liver. It may also not protect against hepatitis B if you are already infected with the virus, even if you do not yet show symptoms.   You should not receive this vaccine if you have ever had a life-threatening allergic reaction to any vaccine containing hepatitis B, or if you are allergic to yeast.  If you have any of these other conditions, your vaccine may need to be postponed or not given at all:  · multiple sclerosis;  · kidney disease (or if you are on dialysis);  · a bleeding or blood clotting disorder such as hemophilia or easy bruising;  · weak immune system (caused by disease or by using certain medicine);  · an allergy to latex; or  · a neurologic disorder or disease affecting the brain (or if this was a reaction to a previous vaccine). You can still receive a vaccine if you have a minor cold. If you have a more severe illness with a fever or any type of infection, your doctor may recommend waiting until you get better before you receive this vaccine. It is not known whether this vaccine will harm an unborn baby. However, if you are at a high risk for infection with hepatitis B during pregnancy, your doctor should determine whether you need this vaccine. If you are pregnant, your name may be listed on a pregnancy registry to track the effects of this vaccine on the baby. It may not be safe to breastfeed while receiving this medicine. Ask your doctor about any risk. How is this vaccine given? This vaccine is given as an injection (shot) into a muscle. A healthcare provider will give you this injection. The hepatitis B vaccine is given in a series of 2 to 4 shots. The booster shots are sometimes given 1 month and 6 months after the first shot. If you have a high risk of hepatitis B infection, you may be given an additional booster 1 to 2 months after the third shot. Your individual booster schedule may be different from these guidelines. Follow your doctor's instructions or the schedule recommended by the health department of the state you live in. What happens if I miss a dose? Contact your doctor if you will miss a booster dose or if you get behind schedule. The next dose should be given as soon as possible. There is no need to start over. Be sure to receive all recommended doses of this vaccine or you may not be fully protected against disease. What happens if I overdose? An overdose of this vaccine is unlikely to occur. What should I avoid before or after receiving this vaccine? Follow your doctor's instructions about any restrictions on food, beverages, or activity. What are the possible side effects of this vaccine?   Get emergency medical help if you have signs of an allergic reaction (hives, difficult breathing, swelling in your face or throat) or a severe skin reaction (fever, sore throat, burning eyes, skin pain, red or purple skin rash with blistering and peeling). You should not receive a booster vaccine if you had a life-threatening allergic reaction after the first shot. Keep track of any and all side effects you have after receiving this vaccine. When you receive a booster dose, you will need to tell the doctor if the previous shot caused any side effects. Becoming infected with hepatitis B is much more dangerous to your health than receiving this vaccine. However, like any medicine, this vaccine can cause side effects but the risk of serious side effects is extremely low. Call your doctor at once if you have:  · a light-headed feeling, like you might pass out;  · seizure-like muscle movements; or  · fever, swollen glands. Common side effects include:  · headache;  · feeling tired; or  · redness, pain, swelling, or a lump where the shot was given. This is not a complete list of side effects and others may occur. Call your doctor for medical advice about side effects. You may report vaccine side effects to the [place] [address] and [facility] at [phone]. What other drugs will affect hepatitis B vaccine? Other drugs may affect this vaccine, including prescription and over-the-counter medicines, vitamins, and herbal products. Tell your doctor about all your current medicines and any medicine you start or stop using. Where can I get more information? Your doctor or pharmacist can provide more information about this vaccine. Additional information is available from your local health department or the Centers for Disease Control and Prevention. Remember, keep this and all other medicines out of the reach of children, never share your medicines with others, and use this medication only for the indication prescribed.    Every effort has been made to ensure that the information provided by [employer] [place] is accurate, up-to-date, and complete, but no guarantee is made to that effect. Drug information contained herein may be time sensitive. [facility] information has been compiled for use by healthcare practitioners and consumers in the [place] and therefore [facility] does not warrant that uses outside of the [place] are appropriate, unless specifically indicated otherwise. [facility]'s drug information does not endorse drugs, diagnose patients or recommend therapy. [facility] drug information is an informational resource designed to assist licensed healthcare practitioners in caring for their patients and/or to serve consumers viewing this service as a supplement to, and not a substitute for, the expertise, skill, knowledge and judgment of healthcare practitioners. The absence of a warning for a given drug or drug combination in no way should be construed to indicate that the drug or drug combination is safe, effective or appropriate for any given patient. [facility] does not assume any responsibility for any aspect of healthcare administered with the aid of information [facility] provides. The information contained herein is not intended to cover all possible uses, directions, precautions, warnings, drug interactions, allergic reactions, or adverse effects. If you have questions about the drugs you are taking, check with your doctor, nurse or pharmacist.  Copyright 8562-4206 [address]: 7.01. Revision date: 4/1/2020. Care instructions adapted under license by [facility] ([facility]). If you have questions about a medical condition or this instruction, always ask your healthcare professional. [place] [address] any warranty or liability for your use of this information.

## 2021-12-31 DIAGNOSIS — M1A.9XX1 CHRONIC GOUT INVOLVING TOE OF LEFT FOOT WITH TOPHUS, UNSPECIFIED CAUSE: ICD-10-CM

## 2022-01-03 RX ORDER — ALLOPURINOL 100 MG/1
TABLET ORAL
Qty: 90 TABLET | Refills: 1 | Status: SHIPPED | OUTPATIENT
Start: 2022-01-03 | End: 2022-04-28 | Stop reason: SDUPTHER

## 2022-01-03 NOTE — TELEPHONE ENCOUNTER
Last visit: 12/23/21  Last Med refill: 10/3/21  Does patient have enough medication for 72 hours: Yes    Next Visit Date:  Future Appointments   Date Time Provider Lizette Marques   1/6/2022 10:00 AM Muhaddis Linnie Homans, MD [address] [place] [address] Maintenance   Topic Date Due    Diabetic retinal exam  Never done    Shingles Vaccine (2 of 2) 05/03/2021    Flu vaccine (1) Never done    Lipid screen  09/22/2021    Creatinine monitoring  02/19/2022 (Originally 2/8/2020)    Potassium monitoring  02/04/2022    Diabetic foot exam  02/19/2022    COVID-19 Vaccine (3 - Booster for Moderna series) 03/10/2022    Depression Screen  05/06/2022    Annual Wellness Visit (AWV)  05/07/2022    A1C test (Diabetic or Prediabetic)  12/23/2022    Colon cancer screen fecal DNA test (Cologuard)  02/10/2023    DTaP/Tdap/Td vaccine (2 - Td or Tdap) 02/01/2029    Pneumococcal 0-64 years Vaccine (2 of 2 - PPSV23) 11/19/2034    Hepatitis B vaccine  Completed    Hepatitis C screen  Completed    HIV screen  Completed    Hepatitis A vaccine  Aged Out    Hib vaccine  Aged Out    Meningococcal (ACWY) vaccine  Aged Out       Hemoglobin A1C (%)   Date Value   12/23/2021 6.0   07/12/2021 10.6   02/19/2021 7.7             ( goal A1C is < 7)   Microalb/Crt.  Ratio (mcg/mg creat)   Date Value   08/12/2021 898 (H)     LDL Cholesterol (mg/dL)   Date Value   09/22/2020 67   06/11/2019 113       (goal LDL is <100)   AST (U/L)   Date Value   02/04/2021 24     ALT (U/L)   Date Value   02/04/2021 21     BUN (mg/dL)   Date Value   02/04/2021 15     BP Readings from Last 3 Encounters:   12/23/21 (!) 142/84   09/16/21 137/75   08/12/21 139/89          (goal 120/80)    All Future Testing planned in [facility]  Lab Frequency Next Occurrence   Hemoglobin A1C Once 09/16/2022   Lipid Panel Once 01/23/2022   Creatinine, Serum Once 12/23/2022               Patient Active Problem List:     Essential hypertension     Chronic back pain     Morbid obesity with BMI of 50.0-59.9, adult ([facility])     ZURDO (obstructive sleep apnea)     Gout     CKD (chronic kidney disease)     Mixed hyperlipidemia     Squamous cell carcinoma of face     Type 2 diabetes mellitus without complication, without long-term current use of insulin ([facility]     Need for prophylactic vaccination and inoculation against varicella     Vitamin D deficiency     BMI 50.0-59.9, adult ([facility]     Type 2 diabetes mellitus with hyperglycemia     Type 2 diabetes mellitus with chronic kidney disease     Back spasm     Screening for hyperlipidemia

## 2022-01-06 ENCOUNTER — OFFICE VISIT (OUTPATIENT)
Dept: FAMILY MEDICINE CLINIC | Age: 52
End: 2022-01-06
Payer: MEDICARE

## 2022-01-06 VITALS
DIASTOLIC BLOOD PRESSURE: 90 MMHG | SYSTOLIC BLOOD PRESSURE: 146 MMHG | HEART RATE: 71 BPM | BODY MASS INDEX: 44.78 KG/M2 | HEIGHT: 70 IN | TEMPERATURE: 96.4 F | WEIGHT: 312.8 LBS

## 2022-01-06 DIAGNOSIS — E66.01 OBESITY, CLASS III, BMI 40-49.9 (MORBID OBESITY) (HCC): ICD-10-CM

## 2022-01-06 DIAGNOSIS — E11.69 TYPE 2 DIABETES MELLITUS WITH OTHER SPECIFIED COMPLICATION, WITHOUT LONG-TERM CURRENT USE OF INSULIN (HCC): Primary | ICD-10-CM

## 2022-01-06 LAB — HBA1C MFR BLD: 6.1 %

## 2022-01-06 PROCEDURE — G8417 CALC BMI ABV UP PARAM F/U: HCPCS | Performed by: STUDENT IN AN ORGANIZED HEALTH CARE EDUCATION/TRAINING PROGRAM

## 2022-01-06 PROCEDURE — G8427 DOCREV CUR MEDS BY ELIG CLIN: HCPCS | Performed by: STUDENT IN AN ORGANIZED HEALTH CARE EDUCATION/TRAINING PROGRAM

## 2022-01-06 PROCEDURE — 3017F COLORECTAL CA SCREEN DOC REV: CPT | Performed by: STUDENT IN AN ORGANIZED HEALTH CARE EDUCATION/TRAINING PROGRAM

## 2022-01-06 PROCEDURE — 1036F TOBACCO NON-USER: CPT | Performed by: STUDENT IN AN ORGANIZED HEALTH CARE EDUCATION/TRAINING PROGRAM

## 2022-01-06 PROCEDURE — 83036 HEMOGLOBIN GLYCOSYLATED A1C: CPT | Performed by: STUDENT IN AN ORGANIZED HEALTH CARE EDUCATION/TRAINING PROGRAM

## 2022-01-06 PROCEDURE — 99213 OFFICE O/P EST LOW 20 MIN: CPT | Performed by: STUDENT IN AN ORGANIZED HEALTH CARE EDUCATION/TRAINING PROGRAM

## 2022-01-06 PROCEDURE — G8484 FLU IMMUNIZE NO ADMIN: HCPCS | Performed by: STUDENT IN AN ORGANIZED HEALTH CARE EDUCATION/TRAINING PROGRAM

## 2022-01-06 PROCEDURE — 2022F DILAT RTA XM EVC RTNOPTHY: CPT | Performed by: STUDENT IN AN ORGANIZED HEALTH CARE EDUCATION/TRAINING PROGRAM

## 2022-01-06 PROCEDURE — 3044F HG A1C LEVEL LT 7.0%: CPT | Performed by: STUDENT IN AN ORGANIZED HEALTH CARE EDUCATION/TRAINING PROGRAM

## 2022-01-06 ASSESSMENT — ENCOUNTER SYMPTOMS
NAUSEA: 0
SINUS PAIN: 0
COUGH: 0
DIARRHEA: 0
COLOR CHANGE: 0
BACK PAIN: 0
ABDOMINAL PAIN: 0
SINUS PRESSURE: 0
CONSTIPATION: 0
WHEEZING: 0
SHORTNESS OF BREATH: 0
VOMITING: 0
BLOOD IN STOOL: 0

## 2022-01-06 NOTE — PROGRESS NOTES
test (Cologuard)  02/10/2023    DTaP/Tdap/Td vaccine (2 - Td or Tdap) 02/01/2029    Pneumococcal 0-64 years Vaccine (2 of 2 - PPSV23) 11/19/2034    Hepatitis B vaccine  Completed    Hepatitis C screen  Completed    HIV screen  Completed    Hepatitis A vaccine  Aged Out    Hib vaccine  Aged Out    Meningococcal (ACWY) vaccine  Aged Out

## 2022-01-06 NOTE — PATIENT INSTRUCTIONS
Patient Education        Prediabetes: Care Instructions  Overview     Prediabetes is a warning sign that you're at risk for getting type 2 diabetes. It means that your blood sugar is higher than it should be. But it's not high enough to be diabetes. The food you eat naturally turns into sugar. Your body uses the sugar for energy. Normally, an organ called the pancreas makes insulin. And insulin allows the sugar in your blood to get into your body's cells. But sometimes the body can't use insulin the right way. So the sugar stays in your blood instead. This is called insulin resistance. The buildup of sugar in your blood means you have prediabetes. The good news is that you may be able to prevent or delay diabetes. Making small lifestyle changes, like getting active and changing your eating habits, may help you get your blood sugar back to normal. You can work with your doctor to make a treatment plan. Follow-up care is a key part of your treatment and safety. Be sure to make and go to all appointments, and call your doctor if you are having problems. It's also a good idea to know your test results and keep a list of the medicines you take. How can you care for yourself at home? · Watch your weight. A healthy weight helps your body use insulin properly. · Limit the amount of calories, sweets, and unhealthy fat you eat. Ask your doctor if you should see a dietitian. A registered dietitian can help you create meal plans that fit your lifestyle. · Get at least 30 minutes of exercise on most days of the week. Exercise helps control your blood sugar. It also helps you maintain a healthy weight. Walking is a good choice. You also may want to do other activities, such as running, swimming, cycling, or playing tennis or team sports. · Do not smoke. Smoking can make prediabetes worse. If you need help quitting, talk to your doctor about stop-smoking programs and medicines.  These can increase your chances of quitting for good. · If your doctor prescribed medicines, take them exactly as prescribed. Call your doctor if you think you are having a problem with your medicine. You will get more details on the specific medicines your doctor prescribes. When should you call for help? Watch closely for changes in your health, and be sure to contact your doctor if:    · You have any symptoms of diabetes. These may include:  ? Being thirsty more often. ? Urinating more. ? Being hungrier. ? Losing weight. ? Being very tired. ? Having blurry vision.     · You have a wound that will not heal.     · You have an infection that will not go away.     · You have problems with your blood pressure.     · You want more information about diabetes and how you can keep from getting it. Where can you learn more? Go to [email or web address] and sign in to your [employer] account. Enter I222 in the BlogCN box to learn more about \"Prediabetes: Care Instructions. \"     If you do not have an account, please click on the \"Sign Up Now\" link. Current as of: July 28, 2021               Content Version: 13.1  © 9800-3223 Healthwise, Incorporated. Care instructions adapted under license by [facility] ([facility]). If you have questions about a medical condition or this instruction, always ask your healthcare professional. [address] any warranty or liability for your use of this information.

## 2022-01-06 NOTE — PROGRESS NOTES
Subjective:    Viktor Leong is a 45 y.o. male with  has a past medical history of Hypertension and Obesity. Presented to the office today for:  Chief Complaint   Patient presents with    Hypertension     follow up new meds have helped     Medication Refill     needs meds refills        HPI  This is a 50-year-old gentleman with a history of essential hypertension and diabetes came in today for blood pressure follow-up. CC: Essential hypertension  Patient blood pressure today is 146/90, patient is on amlodipine, lisinopril and Coreg. Patient is compliant with the medication, patient does not have a blood pressure monitoring kit. Patient will get monitoring kit. Is denying any headache, blurry vision, chest pain, shortness of breath and abdominal pain at this time. Cc: Prediabetes/type 2 diabetes  Patient was previously diagnosed with diabetes and was started on Metformin and canagliflozin commonly      Review of Systems   Constitutional: Negative for chills and fever. HENT: Negative for congestion, sinus pressure and sinus pain. Respiratory: Negative for cough, shortness of breath and wheezing. Cardiovascular: Negative for chest pain, palpitations and leg swelling. Gastrointestinal: Negative for abdominal pain, blood in stool, constipation, diarrhea, nausea and vomiting. Genitourinary: Negative for difficulty urinating, flank pain and hematuria. Musculoskeletal: Negative for arthralgias, back pain and myalgias. Skin: Negative for color change and wound. Neurological: Negative for dizziness, light-headedness and headaches. Psychiatric/Behavioral: Negative for agitation and confusion. The patient has a No family history on file.     Objective:    BP (!) 146/90 (Site: Right Upper Arm, Position: Sitting, Cuff Size: Large Adult)   Pulse 71   Temp 96.4 °F (35.8 °C) (Temporal)   Ht 5' 10\" (1.778 m)   Wt (!) 312 lb 12.8 oz (141.9 kg)   BMI 44.88 kg/m²    BP Readings from Last 3 Encounters:   01/06/22 (!) 146/90   12/23/21 (!) 142/84   09/16/21 137/75       Physical Exam  Vitals and nursing note reviewed. Constitutional:       Appearance: Normal appearance. HENT:      Head: Normocephalic and atraumatic. Mouth/Throat:      Mouth: Mucous membranes are moist.   Eyes:      Conjunctiva/sclera: Conjunctivae normal.   Cardiovascular:      Rate and Rhythm: Normal rate and regular rhythm. Pulmonary:      Effort: Pulmonary effort is normal.      Breath sounds: Normal breath sounds. Abdominal:      General: Bowel sounds are normal. There is no distension. Palpations: Abdomen is soft. There is no mass. Tenderness: There is no abdominal tenderness. There is no guarding. Musculoskeletal:      Right lower leg: No edema. Left lower leg: No edema. Neurological:      General: No focal deficit present. Mental Status: He is alert and oriented to person, place, and time. Psychiatric:         Mood and Affect: Mood normal.         Behavior: Behavior normal.         Lab Results   Component Value Date    WBC 9.2 01/23/2018    HGB 11.7 (L) 01/23/2018    HCT 38.5 (L) 01/23/2018     01/23/2018    CHOL 124 09/22/2020    TRIG 102 09/22/2020    HDL 37 (L) 09/22/2020    ALT 21 02/04/2021    AST 24 02/04/2021     02/04/2021    K 4.6 02/04/2021    CL 99 02/04/2021    CREATININE 1.05 02/04/2021    BUN 15 02/04/2021    CO2 26 02/04/2021    TSH 1.57 05/25/2017    LABA1C 6.1 01/06/2022    LABMICR 898 (H) 08/12/2021     Lab Results   Component Value Date    CALCIUM 10.1 02/04/2021    PHOS 3.1 06/06/2017     Lab Results   Component Value Date    LDLCHOLESTEROL 67 09/22/2020       Assessment and Plan:    1. Type 2 diabetes mellitus with other specified complication, without long-term current use of insulin ([facility])  -A1c: 6.1 on 1/6/2022  -Patient never received combination of Metformin and canagliflozin's, somehow patient HbA1c trended down from 10.6 to 6.1.   We will continue to monitor patient HbA1c and management of medication, will consider Metformin on the next visit for possible weight loss. Patient already is increasing physical activities, doing dietary restrictions. 2.  Essential hypertension:  -BP: 146/90  -Patient is on amlodipine, Coreg and lisinopril. Blood pressure is not at goal, patient was taking medication at home time, will continue to evaluate patient blood pressure and consider adding Coreg on the next encounter. Patient was counseled about importance of taking medication which were prescribed at today visit. Patient was also counseled that lifestyle changes including diet, smoking and use of less alcohol will benefit their health in long term. All the question asked by the patient were answered in non-medical terms. Patient understands and agree to the plan. Teach back method was used while having the conversation. Requested Prescriptions      No prescriptions requested or ordered in this encounter       Medications Discontinued During This Encounter   Medication Reason    rosuvastatin (CRESTOR) 20 MG tablet Therapy completed    diclofenac sodium (VOLTAREN) 1 % GEL Therapy completed    canagliflozin-metFORMIN HCl (INVOKAMET)  MG        Min received counseling on the following healthy behaviors: nutrition, exercise and medication adherence    Discussed use,benefit, and side effects of prescribed medications. Barriers to medication compliance addressed. All patient questions answered. Pt voiced understanding. Return in about 3 months (around 4/6/2022) for DM, Lab Work. Disclaimer: Some orall of this note was transcribed using voice-recognition software. This may cause typographical errors occasionally. Although all effort is made to fix these errors, please do not hesitate to contact our office if there Reva Castaneda concern with the understanding of this note.

## 2022-01-06 NOTE — PROGRESS NOTES
Medication Management Service  [facility]  [phone]     CLINICAL PHARMACY NOTE: Comprehensive Medication Review      SUBJECTIVE/OBJECTIVE:    Patient currently prescribed the following medications:    Medication Orders    Current Outpatient Medications     Medication Sig Dispense Refill Dispense History as of 01/06/2022     allopurinol (ZYLOPRIM) 100 MG tablet take 1 tablet by mouth once daily 90 tablet 1 01/03/2022  #90   Taking as prescribed      blood glucose monitor kit and supplies Dispense sufficient amount for indicated testing frequency plus additional to accommodate PRN testing needs. Dispense all needed supplies to include: monitor, strips, lancing device, lancets, control solutions, alcohol swabs. 1 kit 0  Does not currently have glucometer    carvedilol (COREG) 12.5 MG tablet Take 1 tablet by mouth 2 times daily 60 tablet 3 12/23/2021  #60   Taking as prescribed      lisinopril (PRINIVIL;ZESTRIL) 40 MG tablet Take 1 tablet by mouth daily 30 tablet 3 12/27/2021  #30   Taking as prescribed      baclofen (LIORESAL) 10 MG tablet take 1 tablet by mouth nightly 30 tablet 0 12/03/2021  #30   Taking as needed    aspirin EC 81 MG EC tablet Take 1 tablet by mouth daily 90 tablet 1  Purchases over the taking    Has not taken recently      diclofenac sodium (VOLTAREN) 1 % GEL Apply 4 g topically 4 times daily 50 g 5  Not taking. Removed from medication list    blood glucose monitor kit and supplies Dispense sufficient amount for indicated testing frequency plus additional to accommodate PRN testing needs. Dispense all needed supplies to include: monitor, strips, lancing device, lancets, control solutions, alcohol swabs.  1 kit 0      rosuvastatin (CRESTOR) 20 MG tablet Take 1 pill daily 90 tablet 5  Not currently taking      cyclobenzaprine (FLEXERIL) 10 MG tablet Take 1 tablet at night 30 tablet 3 08/12/2021  #30 Taking as needed        amLODIPine (NORVASC) 10 MG tablet Take 1 tablet by mouth nightly 90 tablet 5 10/19/2021  #90   Taking as prescribed      canagliflozin-metFORMIN HCl (INVOKAMET)  MG Take 1 tablet by mouth 2 times daily (with meals) 60 tablet 3 No dispense history available   Not taking. Removed from mediation list.     Lancets MISC 1 each by Does not apply route 3 times daily 600 each 1      zoster recombinant adjuvanted vaccine (SHINGRIX) 50 MCG/0.5ML SUSR injection 50 MCG IM then repeat 2-6 months. 0.5 mL 1      acetaminophen (APAP EXTRA STRENGTH) 500 MG tablet Take 2 tablets by mouth every 6 hours as needed for Pain 180 tablet 1 01/03/2022  #180 Taking as prescribed      RA VITAMIN D-3 50 MCG (2000 UT) CAPS Take 1 capsule by mouth daily 90 capsule 1 01/12/2021  #30 Not taking as prescribed      Blood Pressure KIT 1 kit by Does not apply route daily 1 kit 0       No current facility-administered medications for this visit. Additional Medications: None    ASSESSMENT/PLAN:  · Adherence: Compliant  · Cost: No barriers  · Current pharmacy/pharmacies: [facility]    Medication reconciliation completed. Identified medication discrepancies/issues:    Medication(s) Issue Action   Canagliflozin - Metformin    Rosuvastatin    Vitamin D   Patient not taking medications Provided to reevaluate if therapy remains warranted.      Ilir Lopez, PharmD  PGY2 Ambulatory Care Pharmacy Resident    1/6/2022 10:28 AM    ========================================================================    For Pharmacy Admin Tracking Only     CPA in place:  No   Recommendation Provided To: Provider: 3 via Note to Provider   Intervention Detail: Adherence Monitoring: 3   Gap Closed?: No    Intervention Accepted By: Provider: 0   Time Spent (min): 60

## 2022-01-06 NOTE — PROGRESS NOTES
Attending Physician Statement  I  have discussed the care of Bakari Tam including pertinent history and exam findings with the resident. I agree with the assessment, plan and orders as documented by the resident. BP (!) 146/90 (Site: Right Upper Arm, Position: Sitting, Cuff Size: Large Adult)   Pulse 71   Temp 96.4 °F (35.8 °C) (Temporal)   Ht 5' 10\" (1.778 m)   Wt (!) 312 lb 12.8 oz (141.9 kg)   BMI 44.88 kg/m²    BP Readings from Last 3 Encounters:   22 (!) 146/90   21 (!) 142/84   21 137/75     Wt Readings from Last 3 Encounters:   22 (!) 312 lb 12.8 oz (141.9 kg)   21 (!) 317 lb (143.8 kg)   21 (!) 318 lb 9.6 oz (144.5 kg)          Diagnosis Orders   1.  Type 2 diabetes mellitus with other specified complication, without long-term current use of insulin ([facility])  POCT glycosylated hemoglobin (Hb A1C)   2. Obesity, Class III, BMI 40-49.9 (morbid obesity) ([facility]         Annette Garcia DO 2022 12:24 PM

## 2022-01-22 PROBLEM — Z13.220 SCREENING FOR HYPERLIPIDEMIA: Status: RESOLVED | Noted: 2021-12-23 | Resolved: 2022-01-22

## 2022-04-16 DIAGNOSIS — I10 ESSENTIAL HYPERTENSION: ICD-10-CM

## 2022-04-17 DIAGNOSIS — I10 ESSENTIAL HYPERTENSION: ICD-10-CM

## 2022-04-18 RX ORDER — CARVEDILOL 12.5 MG/1
TABLET ORAL
Qty: 60 TABLET | Refills: 3 | Status: SHIPPED | OUTPATIENT
Start: 2022-04-18 | End: 2022-04-20

## 2022-04-18 RX ORDER — HYDRALAZINE HYDROCHLORIDE 50 MG/1
TABLET, FILM COATED ORAL
Qty: 120 TABLET | Refills: 5 | Status: SHIPPED | OUTPATIENT
Start: 2022-04-18 | End: 2022-04-19

## 2022-04-18 RX ORDER — HYDRALAZINE HYDROCHLORIDE 50 MG/1
TABLET, FILM COATED ORAL
Qty: 120 TABLET | Refills: 5 | OUTPATIENT
Start: 2022-04-18

## 2022-04-18 NOTE — TELEPHONE ENCOUNTER
E-scribe request for med refill. Please review and e-scribe if applicable. Last Visit Date:  01/06/2022  Next Visit Date:  Visit date not found    Hemoglobin A1C (%)   Date Value   01/06/2022 6.1   12/23/2021 6.0   07/12/2021 10.6             ( goal A1C is < 7)   Microalb/Crt.  Ratio (mcg/mg creat)   Date Value   08/12/2021 898 (H)     LDL Cholesterol (mg/dL)   Date Value   09/22/2020 67       (goal LDL is <100)   AST (U/L)   Date Value   02/04/2021 24     ALT (U/L)   Date Value   02/04/2021 21     BUN (mg/dL)   Date Value   02/04/2021 15     BP Readings from Last 3 Encounters:   01/06/22 (!) 146/90   12/23/21 (!) 142/84   09/16/21 137/75          (goal 120/80)        Patient Active Problem List:     Essential hypertension     Chronic back pain     Morbid obesity with BMI of 50.0-59.9, adult ([facility])     ZURDO (obstructive sleep apnea)     Gout     CKD (chronic kidney disease)     Mixed hyperlipidemia     Squamous cell carcinoma of face     Type 2 diabetes mellitus without complication, without long-term current use of insulin ([facility]     Need for prophylactic vaccination and inoculation against varicella     Vitamin D deficiency     BMI 50.0-59.9, adult ([address]     Type 2 diabetes mellitus with hyperglycemia     Type 2 diabetes mellitus with chronic kidney disease     Back spasm      ----Mario Hand

## 2022-04-20 ENCOUNTER — OFFICE VISIT (OUTPATIENT)
Dept: FAMILY MEDICINE CLINIC | Age: 52
End: 2022-04-20
Payer: MEDICARE

## 2022-04-20 ENCOUNTER — HOSPITAL ENCOUNTER (OUTPATIENT)
Age: 52
Setting detail: SPECIMEN
Discharge: HOME | End: 2022-04-20

## 2022-04-20 VITALS
HEART RATE: 75 BPM | WEIGHT: 310.8 LBS | DIASTOLIC BLOOD PRESSURE: 84 MMHG | BODY MASS INDEX: 44.6 KG/M2 | SYSTOLIC BLOOD PRESSURE: 147 MMHG

## 2022-04-20 DIAGNOSIS — M54.41 CHRONIC MIDLINE LOW BACK PAIN WITH RIGHT-SIDED SCIATICA: ICD-10-CM

## 2022-04-20 DIAGNOSIS — I10 ESSENTIAL HYPERTENSION: ICD-10-CM

## 2022-04-20 DIAGNOSIS — G89.29 CHRONIC MIDLINE LOW BACK PAIN WITH RIGHT-SIDED SCIATICA: ICD-10-CM

## 2022-04-20 LAB
ALBUMIN SERPL-MCNC: 4.4 G/DL (ref 3.5–5.2)
ALBUMIN/GLOBULIN RATIO: 1.3 (ref 1–2.5)
ALP BLD-CCNC: 96 U/L (ref 40–129)
ALT SERPL-CCNC: 13 U/L (ref 5–41)
ANION GAP SERPL CALCULATED.3IONS-SCNC: 14 MMOL/L (ref 9–17)
AST SERPL-CCNC: 15 U/L
BILIRUB SERPL-MCNC: 0.32 MG/DL (ref 0.3–1.2)
BUN BLDV-MCNC: 20 MG/DL (ref 6–20)
CALCIUM SERPL-MCNC: 9.8 MG/DL (ref 8.6–10.4)
CHLORIDE BLD-SCNC: 102 MMOL/L (ref 98–107)
CHOLESTEROL/HDL RATIO: 5.2
CHOLESTEROL: 203 MG/DL
CO2: 23 MMOL/L (ref 20–31)
CREAT SERPL-MCNC: 1.22 MG/DL (ref 0.7–1.2)
GFR AFRICAN AMERICAN: >60 ML/MIN
GFR NON-AFRICAN AMERICAN: >60 ML/MIN
GFR SERPL CREATININE-BSD FRML MDRD: ABNORMAL ML/MIN/{1.73_M2}
GLUCOSE BLD-MCNC: 169 MG/DL (ref 70–99)
HDLC SERPL-MCNC: 39 MG/DL
LDL CHOLESTEROL: 127 MG/DL (ref 0–130)
POTASSIUM SERPL-SCNC: 4.9 MMOL/L (ref 3.7–5.3)
SODIUM BLD-SCNC: 139 MMOL/L (ref 135–144)
TOTAL PROTEIN: 7.7 G/DL (ref 6.4–8.3)
TRIGL SERPL-MCNC: 187 MG/DL

## 2022-04-20 PROCEDURE — G8417 CALC BMI ABV UP PARAM F/U: HCPCS | Performed by: STUDENT IN AN ORGANIZED HEALTH CARE EDUCATION/TRAINING PROGRAM

## 2022-04-20 PROCEDURE — G8427 DOCREV CUR MEDS BY ELIG CLIN: HCPCS | Performed by: STUDENT IN AN ORGANIZED HEALTH CARE EDUCATION/TRAINING PROGRAM

## 2022-04-20 PROCEDURE — 1036F TOBACCO NON-USER: CPT | Performed by: STUDENT IN AN ORGANIZED HEALTH CARE EDUCATION/TRAINING PROGRAM

## 2022-04-20 PROCEDURE — 3017F COLORECTAL CA SCREEN DOC REV: CPT | Performed by: STUDENT IN AN ORGANIZED HEALTH CARE EDUCATION/TRAINING PROGRAM

## 2022-04-20 PROCEDURE — 99213 OFFICE O/P EST LOW 20 MIN: CPT | Performed by: STUDENT IN AN ORGANIZED HEALTH CARE EDUCATION/TRAINING PROGRAM

## 2022-04-20 RX ORDER — HYDRALAZINE HYDROCHLORIDE 50 MG/1
25 TABLET, FILM COATED ORAL 3 TIMES DAILY
Qty: 90 TABLET | Refills: 0 | Status: SHIPPED | OUTPATIENT
Start: 2022-04-20 | End: 2022-04-28 | Stop reason: SDUPTHER

## 2022-04-20 RX ORDER — AMLODIPINE BESYLATE 10 MG/1
10 TABLET ORAL NIGHTLY
Qty: 90 TABLET | Refills: 5 | Status: SHIPPED | OUTPATIENT
Start: 2022-04-20 | End: 2022-08-01 | Stop reason: SDUPTHER

## 2022-04-20 RX ORDER — LISINOPRIL 40 MG/1
40 TABLET ORAL DAILY
Qty: 30 TABLET | Refills: 3 | Status: SHIPPED | OUTPATIENT
Start: 2022-04-20 | End: 2022-08-01 | Stop reason: SDUPTHER

## 2022-04-20 RX ORDER — ATORVASTATIN CALCIUM 40 MG/1
40 TABLET, FILM COATED ORAL DAILY
Qty: 30 TABLET | Refills: 3 | Status: SHIPPED | OUTPATIENT
Start: 2022-04-20 | End: 2022-08-11

## 2022-04-20 RX ORDER — BACLOFEN 10 MG/1
10 TABLET ORAL NIGHTLY
Qty: 30 TABLET | Refills: 0 | Status: SHIPPED | OUTPATIENT
Start: 2022-04-20 | End: 2022-04-28 | Stop reason: SDUPTHER

## 2022-04-20 RX ORDER — ASPIRIN 81 MG/1
81 TABLET ORAL DAILY
Qty: 90 TABLET | Refills: 1 | Status: SHIPPED | OUTPATIENT
Start: 2022-04-20 | End: 2022-04-28 | Stop reason: SDUPTHER

## 2022-04-20 RX ORDER — HYDRALAZINE HYDROCHLORIDE 50 MG/1
25 TABLET, FILM COATED ORAL 3 TIMES DAILY
COMMUNITY
End: 2022-04-20 | Stop reason: SDUPTHER

## 2022-04-20 RX ORDER — CARVEDILOL 25 MG/1
25 TABLET ORAL 2 TIMES DAILY
Qty: 60 TABLET | Refills: 2 | Status: SHIPPED | OUTPATIENT
Start: 2022-04-20 | End: 2022-07-11

## 2022-04-20 ASSESSMENT — ENCOUNTER SYMPTOMS
SHORTNESS OF BREATH: 0
VOMITING: 0
SINUS PAIN: 0
CONSTIPATION: 0
WHEEZING: 0
DIARRHEA: 0
COUGH: 0
BLOOD IN STOOL: 0
ABDOMINAL PAIN: 0
BACK PAIN: 0
NAUSEA: 0
COLOR CHANGE: 0
SINUS PRESSURE: 0

## 2022-04-20 ASSESSMENT — PATIENT HEALTH QUESTIONNAIRE - PHQ9
SUM OF ALL RESPONSES TO PHQ QUESTIONS 1-9: 0
2. FEELING DOWN, DEPRESSED OR HOPELESS: 0
SUM OF ALL RESPONSES TO PHQ9 QUESTIONS 1 & 2: 0
SUM OF ALL RESPONSES TO PHQ QUESTIONS 1-9: 0
SUM OF ALL RESPONSES TO PHQ QUESTIONS 1-9: 0
1. LITTLE INTEREST OR PLEASURE IN DOING THINGS: 0
SUM OF ALL RESPONSES TO PHQ QUESTIONS 1-9: 0

## 2022-04-20 NOTE — PATIENT INSTRUCTIONS
Thank you for letting us take care of you today. We hope all your questions were addressed. If a question was overlooked or something else comes to mind after you return home, please contact a member of your Care Team listed below. Your Care Team at [place] [id number] is Team #5  Xin Williamson MD (Faculty)  Brandon Ricks MD (Resident)  Eleanor Dennison MD (Resident)  Gretchen Stallworth MD (Resident)  Georgi Pizarro MD (Resident)  Jessi Bunn., [facility]  Leatha Castellanos., EPHRAIM Piedra., Lauro Georges., [facility])  Idalia Dave, [address] (Clinical Practice Manager)  Jericho Reyez, [address] [place] [address] (Clinical Pharmacist)       Office phone number: [phone]    If you need to get in right away due to illness, please be advised we have \"Same Day\" appointments available Monday-Friday. Please call us at [phone] option #3 to schedule your \"Same Day\" appointment.

## 2022-04-20 NOTE — PROGRESS NOTES
Subjective:    Sherren Ade is a 45 y.o. male with  has a past medical history of Hypertension and Obesity. Presented to the office today for:  Chief Complaint   Patient presents with    Hypertension     follow up       HPI  Is a 42-year-old gentleman with a history of essential hypertension came in today for blood pressure follow-up. Cc: Essential HTN   Today's Blood Pressure: 147/84   BP monitor: No   Readings at home: NA    Symptoms of HA/CP/SOB/Blurry vision/Racing of heart/AP: No   Current medication: Bladder pain 10 mg, lisinopril 40 mg, Coreg 12.5 mg, hydralazine 50 mg 3 times daily   Compliance: Yes   Smoking: No   Previous CVA/CAD/DM: No   Last Lipid Profile: Ordered today   Last Hba1c: 6.1 on 1/6/2022   BP at Goal: No  Hypertension is defined according to age. Age group Hypertension Goal   Adults 18 to 72 ?140/90 120 to 130/70 to 79   Adults 65 to 79 ? 140/90 130 to 139/70 to 79     Adults 80 years and older   ? 160/90   130 to 139/70 to 78     Adults with diabetes, 25to 72years of age   ? 140/90   120 to 130/70 to 78     Adults with CKD, 25to 72years of age   ? 140/90   130 to 139/70 to 79               Review of Systems   Constitutional: Negative for chills and fever. HENT: Negative for congestion, sinus pressure and sinus pain. Respiratory: Negative for cough, shortness of breath and wheezing. Cardiovascular: Negative for chest pain, palpitations and leg swelling. Gastrointestinal: Negative for abdominal pain, blood in stool, constipation, diarrhea, nausea and vomiting. Genitourinary: Negative for difficulty urinating, flank pain and hematuria. Musculoskeletal: Negative for arthralgias, back pain and myalgias. Skin: Negative for color change and wound. Neurological: Negative for dizziness, light-headedness and headaches. Psychiatric/Behavioral: Negative for agitation and confusion. ROS negative except what mentioned in HPI.         The patient has a No family history on file. Objective:    BP (!) 147/84 (Site: Right Lower Arm, Position: Sitting, Cuff Size: Medium Adult)   Pulse 75   Wt (!) 310 lb 12.8 oz (141 kg)   BMI 44.60 kg/m²    BP Readings from Last 3 Encounters:   04/20/22 (!) 147/84   01/06/22 (!) 146/90   12/23/21 (!) 142/84       Physical Exam  Vitals and nursing note reviewed. Constitutional:       Appearance: Normal appearance. HENT:      Head: Normocephalic and atraumatic. Mouth/Throat:      Mouth: Mucous membranes are moist.   Eyes:      Conjunctiva/sclera: Conjunctivae normal.   Cardiovascular:      Rate and Rhythm: Normal rate and regular rhythm. Pulmonary:      Effort: Pulmonary effort is normal.      Breath sounds: Normal breath sounds. Abdominal:      General: Bowel sounds are normal. There is no distension. Palpations: Abdomen is soft. There is no mass. Tenderness: There is no abdominal tenderness. There is no guarding. Musculoskeletal:      Right lower leg: No edema. Left lower leg: No edema. Neurological:      General: No focal deficit present. Mental Status: He is alert and oriented to person, place, and time. Psychiatric:         Mood and Affect: Mood normal.         Behavior: Behavior normal.         Lab Results   Component Value Date    WBC 9.2 01/23/2018    HGB 11.7 (L) 01/23/2018    HCT 38.5 (L) 01/23/2018     01/23/2018    CHOL 124 09/22/2020    TRIG 102 09/22/2020    HDL 37 (L) 09/22/2020    ALT 21 02/04/2021    AST 24 02/04/2021     02/04/2021    K 4.6 02/04/2021    CL 99 02/04/2021    CREATININE 1.05 02/04/2021    BUN 15 02/04/2021    CO2 26 02/04/2021    TSH 1.57 05/25/2017    LABA1C 6.1 01/06/2022    LABMICR 898 (H) 08/12/2021     Lab Results   Component Value Date    CALCIUM 10.1 02/04/2021    PHOS 3.1 06/06/2017     Lab Results   Component Value Date    LDLCHOLESTEROL 67 09/22/2020       Examination including this and mentioned above in HPI.     Assessment and Plan: 1. Essential hypertension  -BP: 147/84  -Increased Coreg to 25 mg twice daily, hydralazine was previously discontinued, will titrate down to 25 mg 3 times daily. If patient blood pressure is better controlled will discontinue on the next encounter.  - amLODIPine (NORVASC) 10 MG tablet; Take 1 tablet by mouth nightly  Dispense: 90 tablet; Refill: 5  - aspirin EC 81 MG EC tablet; Take 1 tablet by mouth daily  Dispense: 90 tablet; Refill: 1  - carvedilol (COREG) 25 MG tablet; Take 1 tablet by mouth 2 times daily  Dispense: 60 tablet; Refill: 2  - lisinopril (PRINIVIL;ZESTRIL) 40 MG tablet; Take 1 tablet by mouth daily  Dispense: 30 tablet; Refill: 3  - atorvastatin (LIPITOR) 40 MG tablet; Take 1 tablet by mouth daily  Dispense: 30 tablet; Refill: 3  - Comprehensive Metabolic Panel; Future  - Lipid Panel; Future  - hydrALAZINE (APRESOLINE) 50 MG tablet; Take 0.5 tablets by mouth 3 times daily  Dispense: 90 tablet; Refill: 0    2. Chronic midline low back pain with right-sided sciatica  - baclofen (LIORESAL) 10 MG tablet; Take 1 tablet by mouth nightly  Dispense: 30 tablet;  Refill: 0          Requested Prescriptions     Signed Prescriptions Disp Refills    amLODIPine (NORVASC) 10 MG tablet 90 tablet 5     Sig: Take 1 tablet by mouth nightly    aspirin EC 81 MG EC tablet 90 tablet 1     Sig: Take 1 tablet by mouth daily    baclofen (LIORESAL) 10 MG tablet 30 tablet 0     Sig: Take 1 tablet by mouth nightly    carvedilol (COREG) 25 MG tablet 60 tablet 2     Sig: Take 1 tablet by mouth 2 times daily    lisinopril (PRINIVIL;ZESTRIL) 40 MG tablet 30 tablet 3     Sig: Take 1 tablet by mouth daily    atorvastatin (LIPITOR) 40 MG tablet 30 tablet 3     Sig: Take 1 tablet by mouth daily    hydrALAZINE (APRESOLINE) 50 MG tablet 90 tablet 0     Sig: Take 0.5 tablets by mouth 3 times daily       Medications Discontinued During This Encounter   Medication Reason    RA VITAMIN D-3 50 MCG (2000 UT) CAPS Therapy completed    amLODIPine (NORVASC) 10 MG tablet REORDER    aspirin EC 81 MG EC tablet REORDER    baclofen (LIORESAL) 10 MG tablet REORDER    lisinopril (PRINIVIL;ZESTRIL) 40 MG tablet REORDER    carvedilol (COREG) 12.5 MG tablet     hydrALAZINE (APRESOLINE) 50 MG tablet REORDER       Min received counseling on the following healthy behaviors: nutrition, exercise and medication adherence      Patient was counseled about importance of taking medication which were prescribed today and also which he is already using during today conversation. Discussed use,benefit, and side effects of prescribed medications. Barriers to medication compliance addressed. Patient was also counseled that lifestyle changes including diet, smoking and use of less alcohol will benefit their health in long term. All the question asked by the patient were answered in non-medical terms and to be best of writer knowledge. Patient understands and agree to the plan. Teach back method was used while having the conversation. All patient questions answered. Pt voiced understanding. Return in about 1 week (around 4/27/2022) for HTN, Lab Work. Disclaimer: Some oral of this note was transcribed using voice-recognition software. This may cause typographical errors occasionally, please review it with the context of the note. Although all effort is made to fix these errors, please do not hesitate to contact our office if there Valeri Beam concern with the understanding of this note.

## 2022-04-20 NOTE — PROGRESS NOTES
Attending Physician Statement  I  have discussed the care of Renee Arevalo including pertinent history and exam findings with the resident. I agree with the assessment, plan and orders as documented by the resident. BP (!) 147/84 (Site: Right Lower Arm, Position: Sitting, Cuff Size: Medium Adult)   Pulse 75   Wt (!) 310 lb 12.8 oz (141 kg)   BMI 44.60 kg/m²    BP Readings from Last 3 Encounters:   04/20/22 (!) 147/84   01/06/22 (!) 146/90   12/23/21 (!) 142/84     Wt Readings from Last 3 Encounters:   04/20/22 (!) 310 lb 12.8 oz (141 kg)   01/06/22 (!) 312 lb 12.8 oz (141.9 kg)   12/23/21 (!) 317 lb (143.8 kg)          Diagnosis Orders   1. Essential hypertension  amLODIPine (NORVASC) 10 MG tablet    aspirin EC 81 MG EC tablet    carvedilol (COREG) 25 MG tablet    lisinopril (PRINIVIL;ZESTRIL) 40 MG tablet    atorvastatin (LIPITOR) 40 MG tablet    Comprehensive Metabolic Panel    Lipid Panel    hydrALAZINE (APRESOLINE) 50 MG tablet   2.  Chronic midline low back pain with right-sided sciatica  baclofen (LIORESAL) 10 MG tablet       Airam Alvarez DO 4/20/2022 4:04 PM

## 2022-04-20 NOTE — PROGRESS NOTES
Visit Information    Have you changed or started any medications since your last visit including any over-the-counter medicines, vitamins, or herbal medicines? no   Are you having any side effects from any of your medications? -  no  Have you stopped taking any of your medications? Is so, why? -  no    Have you seen any other physician or provider since your last visit? No  Have you had any other diagnostic tests since your last visit? No  Have you been seen in the emergency room and/or had an admission to a hospital since we last saw you? No  Have you had your routine dental cleaning in the past 6 months? no    Have you activated your [employer] account? If not, what are your barriers?  Yes     Patient Care Team:  Jasmina Haq MD as PCP - General (Emergency Medicine)  Cheko Burks MD as PCP - [facility] Provider    Medical History Review  Past Medical, Family, and Social History reviewed and does not contribute to the patient presenting condition    Health Maintenance   Topic Date Due    Diabetic retinal exam  Never done    Pneumococcal 0-64 years Vaccine (2 - PCV) 01/10/2018    Creatinine  02/08/2020    Shingles Vaccine (2 of 2) 05/03/2021    Lipids  09/22/2021    Potassium  02/04/2022    COVID-19 Vaccine (3 - Booster for Moderna series) 02/10/2022    Diabetic foot exam  02/19/2022    Depression Screen  05/06/2022    Annual Wellness Visit (AWV)  05/07/2022    Flu vaccine (Season Ended) 09/01/2022    A1C test (Diabetic or Prediabetic)  01/06/2023    Colorectal Cancer Screen  02/10/2023    DTaP/Tdap/Td vaccine (2 - Td or Tdap) 02/01/2029    Hepatitis B vaccine  Completed    Hepatitis C screen  Completed    HIV screen  Completed    Hepatitis A vaccine  Aged Out    Hib vaccine  Aged Out    Meningococcal (ACWY) vaccine  Aged Out

## 2022-04-28 ENCOUNTER — OFFICE VISIT (OUTPATIENT)
Dept: FAMILY MEDICINE CLINIC | Age: 52
End: 2022-04-28
Payer: MEDICARE

## 2022-04-28 VITALS
WEIGHT: 308 LBS | BODY MASS INDEX: 44.19 KG/M2 | TEMPERATURE: 96.6 F | DIASTOLIC BLOOD PRESSURE: 85 MMHG | HEART RATE: 70 BPM | SYSTOLIC BLOOD PRESSURE: 131 MMHG

## 2022-04-28 DIAGNOSIS — I15.9 SECONDARY HYPERTENSION: Primary | ICD-10-CM

## 2022-04-28 DIAGNOSIS — M54.50 CHRONIC BILATERAL LOW BACK PAIN WITHOUT SCIATICA: ICD-10-CM

## 2022-04-28 DIAGNOSIS — G89.29 CHRONIC BILATERAL LOW BACK PAIN WITHOUT SCIATICA: ICD-10-CM

## 2022-04-28 DIAGNOSIS — M54.41 CHRONIC MIDLINE LOW BACK PAIN WITH RIGHT-SIDED SCIATICA: ICD-10-CM

## 2022-04-28 DIAGNOSIS — M1A.9XX1 CHRONIC GOUT INVOLVING TOE OF LEFT FOOT WITH TOPHUS, UNSPECIFIED CAUSE: ICD-10-CM

## 2022-04-28 DIAGNOSIS — G89.29 CHRONIC MIDLINE LOW BACK PAIN WITH RIGHT-SIDED SCIATICA: ICD-10-CM

## 2022-04-28 PROCEDURE — 99213 OFFICE O/P EST LOW 20 MIN: CPT | Performed by: STUDENT IN AN ORGANIZED HEALTH CARE EDUCATION/TRAINING PROGRAM

## 2022-04-28 PROCEDURE — G8427 DOCREV CUR MEDS BY ELIG CLIN: HCPCS | Performed by: STUDENT IN AN ORGANIZED HEALTH CARE EDUCATION/TRAINING PROGRAM

## 2022-04-28 PROCEDURE — G8417 CALC BMI ABV UP PARAM F/U: HCPCS | Performed by: STUDENT IN AN ORGANIZED HEALTH CARE EDUCATION/TRAINING PROGRAM

## 2022-04-28 PROCEDURE — 99211 OFF/OP EST MAY X REQ PHY/QHP: CPT | Performed by: STUDENT IN AN ORGANIZED HEALTH CARE EDUCATION/TRAINING PROGRAM

## 2022-04-28 PROCEDURE — 3017F COLORECTAL CA SCREEN DOC REV: CPT | Performed by: STUDENT IN AN ORGANIZED HEALTH CARE EDUCATION/TRAINING PROGRAM

## 2022-04-28 PROCEDURE — 1036F TOBACCO NON-USER: CPT | Performed by: STUDENT IN AN ORGANIZED HEALTH CARE EDUCATION/TRAINING PROGRAM

## 2022-04-28 RX ORDER — BACLOFEN 10 MG/1
10 TABLET ORAL NIGHTLY
Qty: 30 TABLET | Refills: 0 | Status: SHIPPED | OUTPATIENT
Start: 2022-04-28

## 2022-04-28 RX ORDER — ASPIRIN 81 MG/1
81 TABLET ORAL DAILY
Qty: 90 TABLET | Refills: 1 | Status: SHIPPED | OUTPATIENT
Start: 2022-04-28 | End: 2022-08-01 | Stop reason: SDUPTHER

## 2022-04-28 RX ORDER — ALLOPURINOL 100 MG/1
TABLET ORAL
Qty: 90 TABLET | Refills: 1 | Status: SHIPPED | OUTPATIENT
Start: 2022-04-28

## 2022-04-28 RX ORDER — HYDRALAZINE HYDROCHLORIDE 50 MG/1
25 TABLET, FILM COATED ORAL 3 TIMES DAILY
Qty: 90 TABLET | Refills: 2 | Status: SHIPPED | OUTPATIENT
Start: 2022-04-28 | End: 2022-08-01

## 2022-04-28 RX ORDER — ACETAMINOPHEN 500 MG
1000 TABLET ORAL EVERY 6 HOURS PRN
Qty: 180 TABLET | Refills: 1 | Status: SHIPPED | OUTPATIENT
Start: 2022-04-28

## 2022-04-28 SDOH — ECONOMIC STABILITY: FOOD INSECURITY: WITHIN THE PAST 12 MONTHS, THE FOOD YOU BOUGHT JUST DIDN'T LAST AND YOU DIDN'T HAVE MONEY TO GET MORE.: OFTEN TRUE

## 2022-04-28 SDOH — ECONOMIC STABILITY: FOOD INSECURITY: WITHIN THE PAST 12 MONTHS, YOU WORRIED THAT YOUR FOOD WOULD RUN OUT BEFORE YOU GOT MONEY TO BUY MORE.: OFTEN TRUE

## 2022-04-28 ASSESSMENT — SOCIAL DETERMINANTS OF HEALTH (SDOH): HOW HARD IS IT FOR YOU TO PAY FOR THE VERY BASICS LIKE FOOD, HOUSING, MEDICAL CARE, AND HEATING?: NOT HARD AT ALL

## 2022-04-28 ASSESSMENT — ENCOUNTER SYMPTOMS
BLOOD IN STOOL: 0
COUGH: 0
DIARRHEA: 0
VOMITING: 0
COLOR CHANGE: 0
CONSTIPATION: 0
ABDOMINAL PAIN: 0
NAUSEA: 0
SINUS PAIN: 0
BACK PAIN: 0
SHORTNESS OF BREATH: 0
WHEEZING: 0
SINUS PRESSURE: 0

## 2022-04-28 NOTE — PROGRESS NOTES
HYPERTENSION visit     BP Readings from Last 3 Encounters:   04/20/22 (!) 147/84   01/06/22 (!) 146/90   12/23/21 (!) 142/84       LDL Cholesterol (mg/dL)   Date Value   04/20/2022 127     HDL (mg/dL)   Date Value   04/20/2022 39 (L)     BUN (mg/dL)   Date Value   04/20/2022 20     CREATININE (mg/dL)   Date Value   04/20/2022 1.22 (H)     Glucose (mg/dL)   Date Value   04/20/2022 169 (H)   06/06/2017 222 (H)              Have you changed or started any medications since your last visit including any over-the-counter medicines, vitamins, or herbal medicines? no   Have you stopped taking any of your medications? Is so, why? -  no  Are you having any side effects from any of your medications? - no  How often do you miss doses of your medication? no      Have you seen any other physician or provider since your last visit?  no   Have you had any other diagnostic tests since your last visit?  no   Have you been seen in the emergency room and/or had an admission in a hospital since we last saw you?  no   Have you had your routine dental cleaning in the past 6 months?  no     Do you have an active MyChart account? If no, what is the barrier?   No:     Patient Care Team:  Arsh Sultana MD as PCP - General (Emergency Medicine)  Clive Barber MD as PCP - [facility] Provider    Medical History Review  Past Medical, Family, and Social History reviewed and does not contribute to the patient presenting condition    Health Maintenance   Topic Date Due    Diabetic retinal exam  Never done    Pneumococcal 0-64 years Vaccine (2 - PCV) 01/10/2018    Shingles Vaccine (2 of 2) 05/03/2021    COVID-19 Vaccine (3 - Booster for [place] series) 02/10/2022    Diabetic foot exam  02/19/2022    Annual Wellness Visit (AWV)  05/07/2022    Flu vaccine (Season Ended) 09/01/2022    A1C test (Diabetic or Prediabetic)  01/06/2023    Colorectal Cancer Screen  02/10/2023    Lipids  04/20/2023    Depression Screen  04/20/2023    Potassium  04/20/2023    Creatinine  04/20/2023    DTaP/Tdap/Td vaccine (2 - Td or Tdap) 02/01/2029    Hepatitis B vaccine  Completed    Hepatitis C screen  Completed    HIV screen  Completed    Hepatitis A vaccine  Aged Out    Hib vaccine  Aged Out    Meningococcal (ACWY) vaccine  Aged Out

## 2022-04-28 NOTE — PROGRESS NOTES
Attending Physician Statement  I  have discussed the care of Anna Malone including pertinent history and exam findings with the resident. I agree with the assessment, plan and orders as documented by the resident. /85 (Site: Left Upper Arm, Position: Sitting, Cuff Size: Large Adult)   Pulse 70   Temp 96.6 °F (35.9 °C) (Temporal)   Wt (!) 308 lb (139.7 kg)   BMI 44.19 kg/m²    BP Readings from Last 3 Encounters:   04/28/22 131/85   04/20/22 (!) 147/84   01/06/22 (!) 146/90     Wt Readings from Last 3 Encounters:   04/28/22 (!) 308 lb (139.7 kg)   04/20/22 (!) 310 lb 12.8 oz (141 kg)   01/06/22 (!) 312 lb 12.8 oz (141.9 kg)          Diagnosis Orders   1. Secondary hypertension  aspirin EC 81 MG EC tablet    hydrALAZINE (APRESOLINE) 50 MG tablet    Aldosterone    Renin    Metanephrines Plasma Free    Metanephrines Urine    AFL (Epic) - Dennys Palmer MD, Nephrology, [facility]   2. Chronic midline low back pain with right-sided sciatica  baclofen (LIORESAL) 10 MG tablet   3. Chronic bilateral low back pain without sciatica  acetaminophen (APAP EXTRA STRENGTH) 500 MG tablet   4.  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

## 2022-04-28 NOTE — PROGRESS NOTES
Subjective:    Leonard Carter is a 45 y.o. male with  has a past medical history of Hypertension and Obesity. Presented to the office today for:  Chief Complaint   Patient presents with    Hypertension     patient states he is taking his b/p medication everyday    Results     patient is wanting results on blood work       HPI  This is a 79-year-old gentleman with a history of hypertension came in today for blood pressure follow-up. Cc: Hypertension   Today's Blood Pressure: 131/85   BP monitor: Yes   Readings at home: 130s/80s    Symptoms of HA/CP/SOB/Blurry vision/Racing of heart/AP: None   Current medication: Lisinopril 40 mg, hydralazine 25 mg 3 times daily, Coreg 25 mg twice daily, amlodipine 10 Mg daily.  Compliance: Yes   Smoking: No   Previous CVA/CAD/DM: None   Last Lipid Profile: Cholesterol: 203, LDL: 127, triglyceride: 187 on 4/20/2022   Last Hba1c: 6.1 on 1/6/2022   BP at Goal: Yes  Hypertension is defined according to age. Age group Hypertension Goal   Adults 18 to 72 ?140/90 120 to 130/70 to 79   Adults 65 to 79 ? 140/90 130 to 139/70 to 79     Adults 80 years and older   ? 160/90   130 to 139/70 to 78     Adults with diabetes, 25to 72years of age   ? 140/90   120 to 130/70 to 78     Adults with CKD, 25to 72years of age   ? 140/90   130 to 139/70 to 79               Review of Systems   Constitutional: Negative for chills and fever. HENT: Negative for congestion, sinus pressure and sinus pain. Respiratory: Negative for cough, shortness of breath and wheezing. Cardiovascular: Negative for chest pain, palpitations and leg swelling. Gastrointestinal: Negative for abdominal pain, blood in stool, constipation, diarrhea, nausea and vomiting. Genitourinary: Negative for difficulty urinating, flank pain and hematuria. Musculoskeletal: Negative for arthralgias, back pain and myalgias. Skin: Negative for color change and wound.    Neurological: Negative for dizziness, light-headedness and headaches. Psychiatric/Behavioral: Negative for agitation and confusion. ROS negative except what mentioned in HPI. The patient has a No family history on file. Objective:    /85 (Site: Left Upper Arm, Position: Sitting, Cuff Size: Large Adult)   Pulse 70   Temp 96.6 °F (35.9 °C) (Temporal)   Wt (!) 308 lb (139.7 kg)   BMI 44.19 kg/m²    BP Readings from Last 3 Encounters:   04/28/22 131/85   04/20/22 (!) 147/84   01/06/22 (!) 146/90       Physical Exam  Vitals and nursing note reviewed. Constitutional:       Appearance: Normal appearance. HENT:      Head: Normocephalic and atraumatic. Mouth/Throat:      Mouth: Mucous membranes are moist.   Eyes:      Conjunctiva/sclera: Conjunctivae normal.   Cardiovascular:      Rate and Rhythm: Normal rate and regular rhythm. Pulmonary:      Effort: Pulmonary effort is normal.      Breath sounds: Normal breath sounds. Abdominal:      General: Bowel sounds are normal. There is no distension. Palpations: Abdomen is soft. There is no mass. Tenderness: There is no abdominal tenderness. There is no guarding. Musculoskeletal:      Right lower leg: No edema. Left lower leg: No edema. Neurological:      General: No focal deficit present. Mental Status: He is alert and oriented to person, place, and time.    Psychiatric:         Mood and Affect: Mood normal.         Behavior: Behavior normal.         Lab Results   Component Value Date    WBC 9.2 01/23/2018    HGB 11.7 (L) 01/23/2018    HCT 38.5 (L) 01/23/2018     01/23/2018    CHOL 203 (H) 04/20/2022    TRIG 187 (H) 04/20/2022    HDL 39 (L) 04/20/2022    ALT 13 04/20/2022    AST 15 04/20/2022     04/20/2022    K 4.9 04/20/2022     04/20/2022    CREATININE 1.22 (H) 04/20/2022    BUN 20 04/20/2022    CO2 23 04/20/2022    TSH 1.57 05/25/2017    LABA1C 6.1 01/06/2022    LABMICR 898 (H) 08/12/2021     Lab Results   Component Value Date CALCIUM 9.8 04/20/2022    PHOS 3.1 06/06/2017     Lab Results   Component Value Date    LDLCHOLESTEROL 127 04/20/2022       Examination including this and mentioned above in HPI. Assessment and Plan:    1. Secondary hypertension  -Patient is on for blood pressure medication, 3 maxed out, thiazide diuretic is contraindicated because of history of gout, will start patient working up for secondary hypertension will refer him to nephrologist for further evaluation for concern of CKD. -Might have element of obstructive sleep apnea and blood pressure, will do a sleep titration study and  .  - aspirin EC 81 MG EC tablet; Take 1 tablet by mouth daily  Dispense: 90 tablet; Refill: 1  - hydrALAZINE (APRESOLINE) 50 MG tablet; Take 0.5 tablets by mouth 3 times daily  Dispense: 90 tablet; Refill: 2  - Aldosterone; Future  - Renin; Future  - Metanephrines Plasma Free; Future  - Metanephrines Urine;  Future  - AFL (Epic) - Messi Layton MD, Nephrology, [facility] Prescriptions     Signed Prescriptions Disp Refills    baclofen (LIORESAL) 10 MG tablet 30 tablet 0     Sig: Take 1 tablet by mouth nightly    aspirin EC 81 MG EC tablet 90 tablet 1     Sig: Take 1 tablet by mouth daily    acetaminophen (APAP EXTRA STRENGTH) 500 MG tablet 180 tablet 1     Sig: Take 2 tablets by mouth every 6 hours as needed for Pain    hydrALAZINE (APRESOLINE) 50 MG tablet 90 tablet 2     Sig: Take 0.5 tablets by mouth 3 times daily    allopurinol (ZYLOPRIM) 100 MG tablet 90 tablet 1     Sig: take 1 tablet by mouth once daily       Medications Discontinued During This Encounter   Medication Reason    acetaminophen (APAP EXTRA STRENGTH) 500 MG tablet REORDER    allopurinol (ZYLOPRIM) 100 MG tablet REORDER    aspirin EC 81 MG EC tablet REORDER    baclofen (LIORESAL) 10 MG tablet REORDER    hydrALAZINE (APRESOLINE) 50 MG tablet REORDER       Min received counseling on the following healthy behaviors: nutrition, exercise and medication adherence      Patient was counseled about importance of taking medication which were prescribed today and also which he is already using during today conversation. Discussed use,benefit, and side effects of prescribed medications. Barriers to medication compliance addressed. Patient was also counseled that lifestyle changes including diet, smoking and use of less alcohol will benefit their health in long term. All the question asked by the patient were answered in non-medical terms and to be best of writer knowledge. Patient understands and agree to the plan. Teach back method was used while having the conversation. All patient questions answered. Pt voiced understanding. Return in about 3 months (around 7/28/2022) for HTN, Lab Work. Disclaimer: Some oral of this note was transcribed using voice-recognition software. This may cause typographical errors occasionally, please review it with the context of the note. Although all effort is made to fix these errors, please do not hesitate to contact our office if there Johnnie Broach concern with the understanding of this note.

## 2022-05-12 ENCOUNTER — TELEPHONE (OUTPATIENT)
Dept: FAMILY MEDICINE CLINIC | Age: 52
End: 2022-05-12

## 2022-05-12 DIAGNOSIS — N18.32 STAGE 3B CHRONIC KIDNEY DISEASE (HCC): Primary | ICD-10-CM

## 2022-05-12 NOTE — TELEPHONE ENCOUNTER
----- Message from Julisa Randy sent at 5/12/2022 10:55 AM EDT -----  Subject: Referral Request    QUESTIONS   Reason for referral request? Pt would like a referral to [facility] for the   kidney doctor because he was there before. Has the physician seen you for this condition before? No   Preferred Specialist (if applicable)? Do you already have an appointment scheduled? Additional Information for Provider?   ---------------------------------------------------------------------------  --------------  CALL BACK INFO  What is the best way for the office to contact you? OK to leave message on   voicemail  Preferred Call Back Phone Number? [phone]  ---------------------------------------------------------------------------  --------------  SCRIPT ANSWERS  Relationship to Patient?  Self

## 2022-05-12 NOTE — TELEPHONE ENCOUNTER
Per previous message, patient called office asking for a referral to a Nephrologist office at [facility].  Please advise

## 2022-05-13 NOTE — TELEPHONE ENCOUNTER
Can you get the name of nephrologist as I looked up there is no [facility] nephrologist referral in the system.       Thank you

## 2022-05-13 NOTE — TELEPHONE ENCOUNTER
Spoke with patient and he gave writer the name James Leigh MD. She is at [facility]

## 2022-07-11 DIAGNOSIS — I10 ESSENTIAL HYPERTENSION: ICD-10-CM

## 2022-07-11 RX ORDER — CARVEDILOL 25 MG/1
TABLET ORAL
Qty: 60 TABLET | Refills: 2 | Status: SHIPPED | OUTPATIENT
Start: 2022-07-11 | End: 2022-10-12

## 2022-07-11 NOTE — TELEPHONE ENCOUNTER
E-scribe request for med refills. Please review and e-scribe if applicable. Last Visit Date:  4/28/22  Next Visit Date:  Visit date not found    Hemoglobin A1C (%)   Date Value   01/06/2022 6.1   12/23/2021 6.0   07/12/2021 10.6             ( goal A1C is < 7)   Microalb/Crt.  Ratio (mcg/mg creat)   Date Value   08/12/2021 898 (H)     LDL Cholesterol (mg/dL)   Date Value   04/20/2022 127       (goal LDL is <100)   AST (U/L)   Date Value   04/20/2022 15     ALT (U/L)   Date Value   04/20/2022 13     BUN (mg/dL)   Date Value   04/20/2022 20     BP Readings from Last 3 Encounters:   04/28/22 131/85   04/20/22 (!) 147/84   01/06/22 (!) 146/90          (goal 120/80)        Patient Active Problem List:     Essential hypertension     Chronic back pain     Morbid obesity with BMI of 50.0-59.9, adult ([facility])     ZURDO (obstructive sleep apnea)     Gout     CKD (chronic kidney disease)     Mixed hyperlipidemia     Squamous cell carcinoma of face     Type 2 diabetes mellitus without complication, without long-term current use of insulin ([facility] [address]     Need for prophylactic vaccination and inoculation against varicella     Vitamin D deficiency     BMI 50.0-59.9, adult ([facility] [address]     Type 2 diabetes mellitus with hyperglycemia     Type 2 diabetes mellitus with chronic kidney disease     Back spasm     Secondary hypertension      ----Carol Wells

## 2022-07-26 ENCOUNTER — HOSPITAL ENCOUNTER (OUTPATIENT)
Age: 52
Setting detail: SPECIMEN
Discharge: HOME | End: 2022-07-26

## 2022-07-26 DIAGNOSIS — I15.9 SECONDARY HYPERTENSION: ICD-10-CM

## 2022-07-28 LAB
ALDOSTERONE COMMENT: NORMAL
ALDOSTERONE: 63.1 NG/DL
METANEPH/PLASMA INTERP: ABNORMAL
METANEPHRINE: 0.31 NMOL/L (ref 0–0.49)
NORMETANEPHRINE PLASMA: 1 NMOL/L (ref 0–0.89)
RENIN ACTIVITY: 0.7 NG/ML/HR
RENIN COMMENT: NORMAL

## 2022-07-29 LAB
CREATININE URINE /24 HR: NORMAL MG/D (ref 800–2100)
CREATININE URINE /VOLUME: 185 MG/DL
HOURS COLLECTED: NORMAL
METANEPHRINE UF INTERPRETATION: NORMAL
METANEPHRINE UG/G CRE: 77 UG/G CRT (ref 0–300)
METANEPHRINES 24 HOUR URINE: NORMAL UG/D (ref 55–320)
METANEPHRINES, URINE (UMOL/L): 142 UG/L
NORMETANEPHRINE, (G/CRT): 225 UG/G CRT (ref 0–400)
NORMETANEPHRINE, (NMOL/DAY): NORMAL UG/D (ref 114–865)
NORMETANEPHRINES, NMOL/L: 416 UG/L
URINE VOLUME: NORMAL

## 2022-08-01 ENCOUNTER — OFFICE VISIT (OUTPATIENT)
Dept: FAMILY MEDICINE CLINIC | Age: 52
End: 2022-08-01
Payer: MEDICARE

## 2022-08-01 VITALS
SYSTOLIC BLOOD PRESSURE: 132 MMHG | TEMPERATURE: 97.2 F | BODY MASS INDEX: 43.67 KG/M2 | HEIGHT: 70 IN | WEIGHT: 305 LBS | DIASTOLIC BLOOD PRESSURE: 76 MMHG | HEART RATE: 64 BPM

## 2022-08-01 DIAGNOSIS — E11.9 TYPE 2 DIABETES MELLITUS WITHOUT COMPLICATION, WITHOUT LONG-TERM CURRENT USE OF INSULIN (HCC): Primary | ICD-10-CM

## 2022-08-01 DIAGNOSIS — I10 ESSENTIAL HYPERTENSION: ICD-10-CM

## 2022-08-01 DIAGNOSIS — I15.9 SECONDARY HYPERTENSION: ICD-10-CM

## 2022-08-01 DIAGNOSIS — M54.41 CHRONIC MIDLINE LOW BACK PAIN WITH RIGHT-SIDED SCIATICA: ICD-10-CM

## 2022-08-01 DIAGNOSIS — G89.29 CHRONIC MIDLINE LOW BACK PAIN WITH RIGHT-SIDED SCIATICA: ICD-10-CM

## 2022-08-01 LAB — HBA1C MFR BLD: 7.1 %

## 2022-08-01 PROCEDURE — 1036F TOBACCO NON-USER: CPT | Performed by: STUDENT IN AN ORGANIZED HEALTH CARE EDUCATION/TRAINING PROGRAM

## 2022-08-01 PROCEDURE — 3017F COLORECTAL CA SCREEN DOC REV: CPT | Performed by: STUDENT IN AN ORGANIZED HEALTH CARE EDUCATION/TRAINING PROGRAM

## 2022-08-01 PROCEDURE — G8417 CALC BMI ABV UP PARAM F/U: HCPCS | Performed by: STUDENT IN AN ORGANIZED HEALTH CARE EDUCATION/TRAINING PROGRAM

## 2022-08-01 PROCEDURE — 99213 OFFICE O/P EST LOW 20 MIN: CPT | Performed by: STUDENT IN AN ORGANIZED HEALTH CARE EDUCATION/TRAINING PROGRAM

## 2022-08-01 PROCEDURE — 2022F DILAT RTA XM EVC RTNOPTHY: CPT | Performed by: STUDENT IN AN ORGANIZED HEALTH CARE EDUCATION/TRAINING PROGRAM

## 2022-08-01 PROCEDURE — 99211 OFF/OP EST MAY X REQ PHY/QHP: CPT | Performed by: FAMILY MEDICINE

## 2022-08-01 PROCEDURE — 83036 HEMOGLOBIN GLYCOSYLATED A1C: CPT | Performed by: STUDENT IN AN ORGANIZED HEALTH CARE EDUCATION/TRAINING PROGRAM

## 2022-08-01 PROCEDURE — G8427 DOCREV CUR MEDS BY ELIG CLIN: HCPCS | Performed by: STUDENT IN AN ORGANIZED HEALTH CARE EDUCATION/TRAINING PROGRAM

## 2022-08-01 PROCEDURE — 3051F HG A1C>EQUAL 7.0%<8.0%: CPT | Performed by: STUDENT IN AN ORGANIZED HEALTH CARE EDUCATION/TRAINING PROGRAM

## 2022-08-01 RX ORDER — HYDRALAZINE HYDROCHLORIDE 50 MG/1
50 TABLET, FILM COATED ORAL 4 TIMES DAILY
Qty: 90 TABLET | Refills: 2 | Status: SHIPPED | OUTPATIENT
Start: 2022-08-01

## 2022-08-01 RX ORDER — ASPIRIN 81 MG/1
81 TABLET ORAL DAILY
Qty: 90 TABLET | Refills: 5 | Status: SHIPPED | OUTPATIENT
Start: 2022-08-01

## 2022-08-01 RX ORDER — GABAPENTIN 300 MG/1
300 CAPSULE ORAL 3 TIMES DAILY
Qty: 90 CAPSULE | Refills: 2 | Status: SHIPPED | OUTPATIENT
Start: 2022-08-01 | End: 2022-10-30

## 2022-08-01 RX ORDER — AMLODIPINE AND OLMESARTAN MEDOXOMIL 10; 40 MG/1; MG/1
1 TABLET ORAL DAILY
Qty: 90 TABLET | Refills: 5 | Status: SHIPPED | OUTPATIENT
Start: 2022-08-01 | End: 2022-08-23

## 2022-08-01 ASSESSMENT — ENCOUNTER SYMPTOMS
SHORTNESS OF BREATH: 0
ABDOMINAL PAIN: 0
VOMITING: 0
COLOR CHANGE: 0
BLOOD IN STOOL: 0
BACK PAIN: 1
COUGH: 0
SINUS PAIN: 0
DIARRHEA: 0
CONSTIPATION: 0
WHEEZING: 0
SINUS PRESSURE: 0
NAUSEA: 0

## 2022-08-01 ASSESSMENT — PATIENT HEALTH QUESTIONNAIRE - PHQ9
1. LITTLE INTEREST OR PLEASURE IN DOING THINGS: 0
SUM OF ALL RESPONSES TO PHQ QUESTIONS 1-9: 0
2. FEELING DOWN, DEPRESSED OR HOPELESS: 0
SUM OF ALL RESPONSES TO PHQ QUESTIONS 1-9: 0
SUM OF ALL RESPONSES TO PHQ9 QUESTIONS 1 & 2: 0

## 2022-08-01 NOTE — PROGRESS NOTES
Subjective:    Sal Bhat is a 45 y.o. male with  has a past medical history of Hypertension and Obesity. Presented to the office today for:  Chief Complaint   Patient presents with    Hypertension     BP check     Back Pain     Right side back pain, pt went to see nephrologist was told all okay with kidneys        HPI  This is a 67-year-old gentleman with a history of essential hypertension, CKD stage IIIb, chronic back pain came in today for follow-up on blood pressure. Cc: HTN    Today's Blood Pressure: 132/76  BP monitor: Yes  Readings at home: 110s/70s   Symptoms of HA/CP/SOB/Blurry vision/Racing of heart/AP: None  Current medication: Lisinopril 40 mg, hydralazine 50 mg 4 times a day. Amlodipine 10 Mg, lisinopril 40 mg  Compliance: Yes  Smoking: None  Previous CVA/CAD/DM: DM2  Last Lipid Profile: See report  Last Hba1c:  7.1 (T)  BP at Goal: Yes  Hypertension is defined according to age. Age group Hypertension Goal   Adults 18 to 72 ?140/90 120 to 130/70 to 79   Adults 65 to 79 ? 140/90 130 to 139/70 to 79     Adults 80 years and older   ? 160/90   130 to 139/70 to 78     Adults with diabetes, 25to 72years of age   ? 140/90   120 to 130/70 to 78     Adults with CKD, 25to 72years of age   ? 140/90   130 to 139/70 to 79               Review of Systems   Constitutional:  Negative for chills and fever. HENT:  Negative for congestion, sinus pressure and sinus pain. Respiratory:  Negative for cough, shortness of breath and wheezing. Cardiovascular:  Negative for chest pain, palpitations and leg swelling. Gastrointestinal:  Negative for abdominal pain, blood in stool, constipation, diarrhea, nausea and vomiting. Genitourinary:  Negative for difficulty urinating, flank pain and hematuria. Musculoskeletal:  Positive for back pain. Negative for arthralgias and myalgias. Skin:  Negative for color change and wound. Neurological:  Negative for dizziness, light-headedness and headaches. Psychiatric/Behavioral:  Negative for agitation and confusion. ROS negative except what mentioned in HPI. The patient has a No family history on file. Objective:    /76 (Site: Left Upper Arm, Position: Sitting, Cuff Size: Large Adult)   Pulse 64   Temp 97.2 °F (36.2 °C) (Temporal)   Ht 5' 10\" (1.778 m)   Wt (!) 305 lb (138.3 kg)   BMI 43.76 kg/m²    BP Readings from Last 3 Encounters:   08/01/22 132/76   04/28/22 131/85   04/20/22 (!) 147/84       Physical Exam  Vitals and nursing note reviewed. Constitutional:       Appearance: Normal appearance. HENT:      Mouth/Throat:      Mouth: Mucous membranes are moist.   Eyes:      Conjunctiva/sclera: Conjunctivae normal.   Cardiovascular:      Rate and Rhythm: Normal rate and regular rhythm. Pulmonary:      Effort: Pulmonary effort is normal.      Breath sounds: Normal breath sounds. Abdominal:      General: Bowel sounds are normal. There is no distension. Palpations: Abdomen is soft. There is no mass. Tenderness: There is no abdominal tenderness. There is no guarding. Musculoskeletal:      Right lower leg: No edema. Left lower leg: No edema. Neurological:      General: No focal deficit present. Mental Status: He is alert and oriented to person, place, and time.    Psychiatric:         Mood and Affect: Mood normal.         Behavior: Behavior normal.       Lab Results   Component Value Date    WBC 9.2 01/23/2018    HGB 11.7 (L) 01/23/2018    HCT 38.5 (L) 01/23/2018     01/23/2018    CHOL 203 (H) 04/20/2022    TRIG 187 (H) 04/20/2022    HDL 39 (L) 04/20/2022    ALT 13 04/20/2022    AST 15 04/20/2022     04/20/2022    K 4.9 04/20/2022     04/20/2022    CREATININE 1.22 (H) 04/20/2022    BUN 20 04/20/2022    CO2 23 04/20/2022    TSH 1.57 05/25/2017    LABA1C 7.1 08/01/2022    LABMICR 898 (H) 08/12/2021     Lab Results   Component Value Date    CALCIUM 9.8 04/20/2022    PHOS 3.1 06/06/2017     Lab Results   Component Value Date    LDLCHOLESTEROL 127 04/20/2022       Examination including this and mentioned above in HPI. Assessment and Plan:    1. Type 2 diabetes mellitus without complication, without long-term current use of insulin (HCC)  - HbA1c: 6.7 ---> 7.1 (T)  - Patient is already working on weight loss, encouraged the patient to continue healthy lifestyle modification, will start patient on metformin 500 mg twice daily, counseled the patient about long-term consequences of uncontrolled blood sugar numbers.  - metFORMIN (GLUCOPHAGE) 500 MG tablet; Take 1 tablet by mouth in the morning and 1 tablet in the evening. Take with meals. Dispense: 180 tablet; Refill: [address] [facility]    2. Essential hypertension  - BP: 132/76  -We will try to simplify patient medication regimen, counseled the patient that if the new medication is covered do not take lisinopril or amlodipine.   -Likely contributing factor is obstructive sleep apnea, evaluated for sleep study, unable to lay down on the back and cannot wear the mask. - amLODIPine-olmesartan (ELIZABETH) 10-40 MG per tablet; Take 1 tablet by mouth in the morning. Dispense: 90 tablet; Refill: 5  - hydrALAZINE (APRESOLINE) 50 MG tablet; Take 1 tablet by mouth in the morning and 1 tablet at noon and 1 tablet in the evening and 1 tablet before bedtime. Dispense: 90 tablet; Refill: 2  - aspirin EC 81 MG EC tablet; Take 1 tablet by mouth in the morning. Dispense: 90 tablet; Refill: 5    3. Chronic midline low back pain with right-sided sciatica  -Chronic back pain going on since accident in 2000. We will start patient on gabapentin and refer to orthopedic for further evaluation.  - [address] Eduardo Oiler, MD, Orthopedic Surgery, [place]  - gabapentin (NEURONTIN) 300 MG capsule; Take 1 capsule by mouth in the morning and 1 capsule at noon and 1 capsule before bedtime. Do all this for 90 days.  Intended supply: 90 days. Dispense: 90 capsule; Refill: 2          Requested Prescriptions     Signed Prescriptions Disp Refills    amLODIPine-olmesartan (ELIZABETH) 10-40 MG per tablet 90 tablet 5     Sig: Take 1 tablet by mouth in the morning. hydrALAZINE (APRESOLINE) 50 MG tablet 90 tablet 2     Sig: Take 1 tablet by mouth in the morning and 1 tablet at noon and 1 tablet in the evening and 1 tablet before bedtime. gabapentin (NEURONTIN) 300 MG capsule 90 capsule 2     Sig: Take 1 capsule by mouth in the morning and 1 capsule at noon and 1 capsule before bedtime. Do all this for 90 days. Intended supply: 90 days. aspirin EC 81 MG EC tablet 90 tablet 5     Sig: Take 1 tablet by mouth in the morning. metFORMIN (GLUCOPHAGE) 500 MG tablet 180 tablet 1     Sig: Take 1 tablet by mouth in the morning and 1 tablet in the evening. Take with meals. Medications Discontinued During This Encounter   Medication Reason    hydrALAZINE (APRESOLINE) 50 MG tablet     aspirin EC 81 MG EC tablet REORDER       Min received counseling on the following healthy behaviors: nutrition, exercise and medication adherence      Patient was counseled about importance of taking medication which were prescribed today and also which he is already using during today conversation. Discussed use,benefit, and side effects of prescribed medications. Barriers to medication compliance addressed. Patient was also counseled that lifestyle changes including diet, smoking and use of less alcohol will benefit their health in long term. All the question asked by the patient were answered in non-medical terms and to be best of writer knowledge. Patient understands and agree to the plan. Teach back method was used while having the conversation. All patient questions answered. Pt voiced understanding. Return in about 3 months (around 11/1/2022) for Lab Work, HTN, DM.         Disclaimer: Some oral of this note was transcribed using voice-recognition software. This may cause typographical errors occasionally, please review it with the context of the note. Although all effort is made to fix these errors, please do not hesitate to contact our office if there Naif Slater concern with the understanding of this note.

## 2022-08-01 NOTE — PROGRESS NOTES
Attending Physician Statement  I  have discussed the care of Jung Anne including pertinent history and exam findings with the resident. I agree with the assessment, plan and orders as documented by the resident. /76 (Site: Left Upper Arm, Position: Sitting, Cuff Size: Large Adult)   Pulse 64   Temp 97.2 °F (36.2 °C) (Temporal)   Ht 5' 10\" (1.778 m)   Wt (!) 305 lb (138.3 kg)   BMI 43.76 kg/m²    BP Readings from Last 3 Encounters:   08/01/22 132/76   04/28/22 131/85   04/20/22 (!) 147/84     Wt Readings from Last 3 Encounters:   08/01/22 (!) 305 lb (138.3 kg)   04/28/22 (!) 308 lb (139.7 kg)   04/20/22 (!) 310 lb 12.8 oz (141 kg)          Diagnosis Orders   1. Type 2 diabetes mellitus without complication, without long-term current use of insulin ([facility])  POCT glycosylated hemoglobin (Hb A1C)    metFORMIN (GLUCOPHAGE) 500 MG tablet    [address] [facility]      2. Essential hypertension  amLODIPine-olmesartan (ELIZABETH) 10-40 MG per tablet      3. Secondary hypertension  hydrALAZINE (APRESOLINE) 50 MG tablet    aspirin EC 81 MG EC tablet      4.  Chronic midline low back pain with right-sided sciatica  [address] Heladio Simon MD, Orthopedic Surgery, [place]    gabapentin (NEURONTIN) 300 MG capsule          [address] 8/1/2022 2:33 PM

## 2022-08-01 NOTE — PATIENT INSTRUCTIONS
Thank you for letting us take care of you today. We hope all your questions were addressed. If a question was overlooked or something else comes to mind after you return home, please contact a member of your Care Team listed below. Your Care Team at [place] [address] is Team #5  Melanie pSarks MD (Faculty)  Elly Mcfarlane MD (Resident)  Enmanuel Abdalla MD (Resident)  Ventura Aguirre MD (Resident)  Key Colon MD, (Resident)  Lyubov Navarro., CMA  Katharine Wade., RMA  Desiree Landon.,  LPN  Braulio Tucker., Lis Arce., [facility] office)  Harini De La Cruz, [address] [facility] [address] (Clinical Practice Manager)  Jonathan Durbin [facility] (Clinical Pharmacist)       Office phone number: [phone]    If you need to get in right away due to illness, please be advised we have \"Same Day\" appointments available Monday-Friday. Please call us at [phone] option #3 to schedule your \"Same Day\" appointment.

## 2022-08-01 NOTE — PROGRESS NOTES
HYPERTENSION visit     BP Readings from Last 3 Encounters:   08/01/22 132/76   04/28/22 131/85   04/20/22 (!) 147/84       LDL Cholesterol (mg/dL)   Date Value   04/20/2022 127     HDL (mg/dL)   Date Value   04/20/2022 39 (L)     BUN (mg/dL)   Date Value   04/20/2022 20     Creatinine (mg/dL)   Date Value   04/20/2022 1.22 (H)     Glucose (mg/dL)   Date Value   04/20/2022 169 (H)   06/06/2017 222 (H)              Have you changed or started any medications since your last visit including any over-the-counter medicines, vitamins, or herbal medicines? no   Have you stopped taking any of your medications? Is so, why? -  yes - see list  Are you having any side effects from any of your medications? - no  How often do you miss doses of your medication? no      Have you seen any other physician or provider since your last visit? yes - [facility] nephrologist    Have you had any other diagnostic tests since your last visit?  no   Have you been seen in the emergency room and/or had an admission in a hospital since we last saw you?  no   Have you had your routine dental cleaning in the past 6 months?  no     Do you have an active MyChart account? If no, what is the barrier?   Yes    Patient Care Team:  Nataly El MD as PCP - General (Emergency Medicine)  Iban Quintanilla MD as PCP - [facility] Provider    Medical History Review  Past Medical, Family, and Social History reviewed and does not contribute to the patient presenting condition    Health Maintenance   Topic Date Due    Diabetic retinal exam  Never done    COVID-19 Vaccine (3 - Booster for Moderna series) 02/10/2022    Diabetic foot exam  02/19/2022    Annual Wellness Visit (AWV)  05/07/2022    Flu vaccine (1) 09/01/2022    A1C test (Diabetic or Prediabetic)  01/06/2023    Colorectal Cancer Screen  02/10/2023    Lipids  04/20/2023    Depression Screen  04/20/2023    DTaP/Tdap/Td vaccine (4 - Td or Tdap) 07/10/2032    Hepatitis B vaccine  Completed    Shingles vaccine  Completed    Pneumococcal 0-64 years Vaccine  Completed    Hepatitis C screen  Completed    HIV screen  Completed    Hepatitis A vaccine  Aged Out    Hib vaccine  Aged Out    Meningococcal (ACWY) vaccine  Aged Out

## 2022-08-09 NOTE — TELEPHONE ENCOUNTER
E-scribe request for flexiril. Please review and e-scribe if applicable. Last Visit Date:  1-17-20  Next Visit Date:  Visit date not found    Hemoglobin A1C (%)   Date Value   01/17/2020 5.6   10/17/2019 5.3   06/11/2019 5.1             ( goal A1C is < 7)   Microalb/Crt.  Ratio (mcg/mg creat)   Date Value   02/01/2019 196 (H)     LDL Cholesterol (mg/dL)   Date Value   06/11/2019 113       (goal LDL is <100)   AST (U/L)   Date Value   05/25/2017 28     ALT (U/L)   Date Value   05/25/2017 28     BUN (mg/dL)   Date Value   02/08/2019 17     BP Readings from Last 3 Encounters:   01/17/20 (!) 150/94   10/17/19 (!) 154/90   06/11/19 (!) 176/101          (goal 120/80)        Patient Active Problem List:     Essential hypertension     Chronic back pain     Morbid obesity with BMI of 50.0-59.9, adult (HCC)     ZURDO (obstructive sleep apnea)     Gout     CKD (chronic kidney disease)     Mixed hyperlipidemia     Squamous cell carcinoma of face     Type 2 diabetes mellitus without complication, without long-term current use of insulin ([facility] [address]      ----James Ma [As Noted in HPI] : as noted in HPI [Negative] : Heme/Lymph

## 2022-08-11 DIAGNOSIS — I10 ESSENTIAL HYPERTENSION: ICD-10-CM

## 2022-08-11 RX ORDER — ATORVASTATIN CALCIUM 40 MG/1
TABLET, FILM COATED ORAL
Qty: 30 TABLET | Refills: 3 | Status: SHIPPED | OUTPATIENT
Start: 2022-08-11

## 2022-08-11 NOTE — TELEPHONE ENCOUNTER
E-scribe request for med refill. Please review and e-scribe if applicable. Last Visit Date:  08/01/2022  Next Visit Date:  8/22/2022    Hemoglobin A1C (%)   Date Value   08/01/2022 7.1   01/06/2022 6.1   12/23/2021 6.0             ( goal A1C is < 7)   Microalb/Crt.  Ratio (mcg/mg creat)   Date Value   08/12/2021 898 (H)     LDL Cholesterol (mg/dL)   Date Value   04/20/2022 127       (goal LDL is <100)   AST (U/L)   Date Value   04/20/2022 15     ALT (U/L)   Date Value   04/20/2022 13     BUN (mg/dL)   Date Value   04/20/2022 20     BP Readings from Last 3 Encounters:   08/01/22 132/76   04/28/22 131/85   04/20/22 (!) 147/84          (goal 120/80)        Patient Active Problem List:     Essential hypertension     Chronic back pain     Morbid obesity with BMI of 50.0-59.9, adult ([facility])     ZURDO (obstructive sleep apnea)     Gout     CKD (chronic kidney disease)     Mixed hyperlipidemia     Squamous cell carcinoma of face     Type 2 diabetes mellitus without complication, without long-term current use of insulin ([address]     Need for prophylactic vaccination and inoculation against varicella     Vitamin D deficiency     BMI 50.0-59.9, adult ([address]     Type 2 diabetes mellitus with hyperglycemia     Diabetes mellitus ([address]     Back spasm     Secondary hypertension      ----Leslie Hernandez

## 2022-08-18 ENCOUNTER — OFFICE VISIT (OUTPATIENT)
Dept: ORTHOPEDIC SURGERY | Age: 52
End: 2022-08-18
Payer: MEDICARE

## 2022-08-18 VITALS — WEIGHT: 305 LBS | RESPIRATION RATE: 14 BRPM | HEIGHT: 70 IN | BODY MASS INDEX: 43.67 KG/M2

## 2022-08-18 DIAGNOSIS — M54.16 LUMBAR RADICULOPATHY: ICD-10-CM

## 2022-08-18 DIAGNOSIS — M51.36 DDD (DEGENERATIVE DISC DISEASE), LUMBAR: Primary | ICD-10-CM

## 2022-08-18 PROCEDURE — G8427 DOCREV CUR MEDS BY ELIG CLIN: HCPCS | Performed by: PHYSICIAN ASSISTANT

## 2022-08-18 PROCEDURE — 99204 OFFICE O/P NEW MOD 45 MIN: CPT | Performed by: PHYSICIAN ASSISTANT

## 2022-08-18 PROCEDURE — 1036F TOBACCO NON-USER: CPT | Performed by: PHYSICIAN ASSISTANT

## 2022-08-18 PROCEDURE — G8417 CALC BMI ABV UP PARAM F/U: HCPCS | Performed by: PHYSICIAN ASSISTANT

## 2022-08-18 PROCEDURE — 3017F COLORECTAL CA SCREEN DOC REV: CPT | Performed by: PHYSICIAN ASSISTANT

## 2022-08-18 NOTE — PROGRESS NOTES
gabapentin (NEURONTIN) 300 MG capsule, Take 1 capsule by mouth in the morning and 1 capsule at noon and 1 capsule before bedtime. Do all this for 90 days. Intended supply: 90 days. , Disp: 90 capsule, Rfl: 2    aspirin EC 81 MG EC tablet, Take 1 tablet by mouth in the morning., Disp: 90 tablet, Rfl: 5    metFORMIN (GLUCOPHAGE) 500 MG tablet, Take 1 tablet by mouth in the morning and 1 tablet in the evening. Take with meals. , Disp: 180 tablet, Rfl: 1    carvedilol (COREG) 25 MG tablet, take 1 tablet by mouth twice a day, Disp: 60 tablet, Rfl: 2    baclofen (LIORESAL) 10 MG tablet, Take 1 tablet by mouth nightly, Disp: 30 tablet, Rfl: 0    acetaminophen (APAP EXTRA STRENGTH) 500 MG tablet, Take 2 tablets by mouth every 6 hours as needed for Pain, Disp: 180 tablet, Rfl: 1    allopurinol (ZYLOPRIM) 100 MG tablet, take 1 tablet by mouth once daily, Disp: 90 tablet, Rfl: 1    blood glucose monitor kit and supplies, Dispense sufficient amount for indicated testing frequency plus additional to accommodate PRN testing needs. Dispense all needed supplies to include: monitor, strips, lancing device, lancets, control solutions, alcohol swabs., Disp: 1 kit, Rfl: 0    blood glucose monitor kit and supplies, Dispense sufficient amount for indicated testing frequency plus additional to accommodate PRN testing needs.  Dispense all needed supplies to include: monitor, strips, lancing device, lancets, control solutions, alcohol swabs., Disp: 1 kit, Rfl: 0    Lancets MISC, 1 each by Does not apply route 3 times daily, Disp: 600 each, Rfl: 1    Blood Pressure KIT, 1 kit by Does not apply route daily, Disp: 1 kit, Rfl: 0  No Known Allergies  Social History     Socioeconomic History    Marital status: Single     Spouse name: Not on file    Number of children: Not on file    Years of education: Not on file    Highest education level: Not on file   Occupational History    Not on file   Tobacco Use    Smoking status: Former     Packs/day: 0.25 Years: 5.00     Pack years: 1.25     Types: Cigarettes     Quit date: 2008     Years since quittin.3    Smokeless tobacco: Never   Substance and Sexual Activity    Alcohol use: Yes    Drug use: Yes     Frequency: 7.0 times per week     Types: Marijuana Prabhu Kinjal)    Sexual activity: Not on file   Other Topics Concern    Not on file   Social History Narrative    Not on file     Social Determinants of Health     Financial Resource Strain: Low Risk     Difficulty of Paying Living Expenses: Not hard at all   Food Insecurity: Food Insecurity Present    Worried About [address] [employer] in the Last Year: Often true    Ran Out of Food in the Last Year: Often true   Transportation Needs: Not on file   Physical Activity: Not on file   Stress: Not on file   Social Connections: Not on file   Intimate Partner Violence: Not on file   Housing Stability: Not on file     Past Medical History:   Diagnosis Date    Hypertension     Obesity      No past surgical history on file. No family history on file. Review of Systems   Constitutional: Negative for fever, chills, sweats. Eyes: Negative for changes in vision, or pain. HENT: Negative for ear ache, epistaxis, or sore throat. Respiratory/Cardio: Negative for Chest pain, palpitations, SOB, or cough. Gastrointestinal: Negative for abdominal pain, N/V/D. Genitourinary: Negative for dysuria, frequency, urgency, or hematuria. Neurological: Negative for headache, numbness, or weakness. Integumentary: Negative for rash, itching, laceration, or abrasion. Musculoskeletal: Positive for Lower Back Pain       Physical Exam:  Constitutional: Patient is oriented to person, place, and time. Patient appears well-developed and well nourished. HENT: Negative otherwise noted  Head: Normocephalic and Atraumatic  Nose: Normal  Eyes: Conjunctivae and EOM are normal  Neck: Normal range of motion Neck supple.     Respiratory/Cardio: Effort normal. No respiratory distress. Musculoskeletal:    LUMBAR/SACRAL EXAMINATION:  Inspection: Local inspection shows no step-off or bruising. Lumbar alignment is normal.  Sagittal and Coronal balance is neutral.      Palpation:   Moderate right sided tenderness at the lumbar paraspinal. No evidence of tenderness at the midline. Appears to be a lumbar paraspinal muscle spasm on the right. Range of Motion: Lumbar flexion, extension and rotation are mildly limited due to pain. Strength:   Strength testing is 5/5 in all muscle groups tested. Special Tests:   Straight leg raise and crossed SLR positive on the right, negative on left. Leg length and pelvis level.  0 out of 5 Juan's signs. Skin: There are no rashes, ulcerations or lesions. Reflexes: Reflexes are symmetrically 2+ at the patellar and ankle tendons. Clonus absent bilaterally at the feet. Gait & station: normal, patient ambulates without assistance  Additional Examinations:   RIGHT LOWER EXTREMITY: Inspection/examination of the right lower extremity does not show any tenderness, deformity or injury. Range of motion is unremarkable. There is no gross instability. There are no rashes, ulcerations or lesions. Strength and tone are normal.  LEFT LOWER EXTREMITY:  Inspection/examination of the left lower extremity does not show any tenderness, deformity or injury. Range of motion is unremarkable. There is no gross instability. There are no rashes, ulcerations or lesions. Strength and tone are normal.    Neurological: Patient is alert and oriented to person, place, and time. Normal strenght. No sensory deficit. Skin: Skin is warm and dry  Psychiatric: Behavior is normal. Thought content normal.  Nursing note and vitals reviewed. Labs and Imaging:     XR taken today:  No results found. X-rays taken in clinic today and preliminarily reviewed by me 8/18/22:  AP and lateral views of the lumbar spine demonstrate straightening of lumbar lordosis.  Multilevel lumbar DDD most severe at L3-4 with significant disc height collapse at this level. Anterior osteophyte formation of the lower lumbar levels as well. No evidence of acute fracture or obvious spondylolisthesis. Compared with previous x-rays on 4/5/2021 does appear to be some increased disc height collapse at L3-4 but no evidence of other acute abnormality. Orders Placed This Encounter   Procedures    XR LUMBAR SPINE (2-3 VIEWS)     Standing Status:   Future     Number of Occurrences:   1     Standing Expiration Date:   8/18/2023    [facility] Physical Therapy - [place]     Referral Priority:   Routine     Referral Type:   Eval and Treat     Referral Reason:   Specialty Services Required     Requested Specialty:   Physical Therapist     Number of Visits Requested:   Brandie Mead MD, Pain Management, [place]     Referral Priority:   Routine     Referral Type:   Eval and Treat     Referral Reason:   Specialty Services Required     Referred to Provider:   Linda Bojorquez MD     Requested Specialty:   Pain Management     Number of Visits Requested:   1       Assessment and Plan:  1. DDD (degenerative disc disease), lumbar    2. Lumbar radiculopathy          PLAN:  Danyelle Morton is a 45 y.o. old malewho presents today for evaluation of low back pain. Pain has been present for years. Differential includes lumbar strain, fracture, spondylitic spondylolisthesis, lumbar DDD, lumbar radiculopathy, epidural abscess, epidural hematoma and cauda equina syndrome. Based on examination there is no evidence of cord compression syndrome or infectious etiology. Examination is consistent with lumbar radiculopathy secondary to lumbar DDD without evidence of cord compression or other red flag symptoms. .    We discussed treatment options available to him, including nonoperative and operative intervention. At this time I believe he will benefit from conservative management.       Consequently, a prescription for therapy was provided and referral to pain management for possible lumbar epidural steroid injections. I'll see him back my clinic in 8 weeks for reevaluation but he was encouraged to return or call earlier with questions and/or concerns. Please note that this chart was generated using voice recognition Dragon dictation software. Although every effort was made to ensure the accuracy of this automated transcription, some errors in transcription may have occurred.

## 2022-08-23 ENCOUNTER — OFFICE VISIT (OUTPATIENT)
Dept: FAMILY MEDICINE CLINIC | Age: 52
End: 2022-08-23
Payer: MEDICARE

## 2022-08-23 VITALS
HEART RATE: 61 BPM | DIASTOLIC BLOOD PRESSURE: 95 MMHG | BODY MASS INDEX: 43.98 KG/M2 | HEIGHT: 70 IN | WEIGHT: 307.2 LBS | TEMPERATURE: 97 F | SYSTOLIC BLOOD PRESSURE: 179 MMHG

## 2022-08-23 DIAGNOSIS — I10 ESSENTIAL HYPERTENSION: Primary | ICD-10-CM

## 2022-08-23 PROCEDURE — 1036F TOBACCO NON-USER: CPT

## 2022-08-23 PROCEDURE — 3017F COLORECTAL CA SCREEN DOC REV: CPT

## 2022-08-23 PROCEDURE — G8417 CALC BMI ABV UP PARAM F/U: HCPCS

## 2022-08-23 PROCEDURE — 99213 OFFICE O/P EST LOW 20 MIN: CPT

## 2022-08-23 PROCEDURE — G8427 DOCREV CUR MEDS BY ELIG CLIN: HCPCS

## 2022-08-23 RX ORDER — AMLODIPINE AND VALSARTAN 10; 160 MG/1; MG/1
1 TABLET ORAL DAILY
Qty: 30 TABLET | Refills: 3 | Status: SHIPPED | OUTPATIENT
Start: 2022-08-23

## 2022-08-23 ASSESSMENT — ENCOUNTER SYMPTOMS
SHORTNESS OF BREATH: 0
ABDOMINAL PAIN: 0
DIARRHEA: 0
NAUSEA: 0
CONSTIPATION: 0
COUGH: 0
WHEEZING: 0

## 2022-08-23 NOTE — PROGRESS NOTES
Jerry Granados (:  1970) is a 45 y.o. male,Established patient, here for evaluation of the following chief complaint(s):  Hypertension (Patient states he is here for a medication check and was prescribed a new )         ASSESSMENT/PLAN:  1. Essential hypertension  -    Start amlodipine-valsartan as it is covered by patient's insurance  -Continue Coreg and hydralazine   - amLODIPine-valsartan (EXFORGE)  MG per tablet; Take 1 tablet by mouth daily, Disp-30 tablet, R-3Normal  -Follow-up in 2 weeks  -I educated the patient about hypertension symptoms and I asked him to call back the office if there is any problem with getting the new medication from the pharmacy    Return in about 2 weeks (around 2022) for BP follow up. Subjective   SUBJECTIVE/OBJECTIVE:  45years old male patient with past medical history of hypertension, diabetes came to the office for hypertension follow-up. Patient's blood pressure today is elevated 179/95. Patient was given a new medication for hypertension last visit, Any. However, the medication is not covered by his insurance and patient has not been taking this medication since the last visit. I called the pharmacy and they suggested adding amlodipine-valsartan that is covered by patient's insurance. Currently, patient denies having any headache, lightheadedness, dizziness, weakness, numbness, chest pain, shortness of breath, leg swellings, bowel or urinary habit changes. Review of Systems   Constitutional:  Negative for diaphoresis, fatigue and fever. Eyes:  Negative for visual disturbance. Respiratory:  Negative for cough, shortness of breath and wheezing. Cardiovascular:  Negative for chest pain, palpitations and leg swelling. Gastrointestinal:  Negative for abdominal pain, constipation, diarrhea and nausea. Neurological:  Negative for dizziness, weakness, light-headedness, numbness and headaches.         Objective   Physical Exam  Constitutional:       General: He is not in acute distress. Appearance: Normal appearance. Cardiovascular:      Rate and Rhythm: Normal rate and regular rhythm. Pulses: Normal pulses. Heart sounds: Normal heart sounds. No murmur heard. Pulmonary:      Effort: Pulmonary effort is normal.      Breath sounds: Normal breath sounds. No wheezing, rhonchi or rales. Abdominal:      General: Abdomen is flat. Bowel sounds are normal.      Palpations: Abdomen is soft. Musculoskeletal:      Right lower leg: No edema. Left lower leg: No edema. Neurological:      General: No focal deficit present. Mental Status: He is alert and oriented to person, place, and time. Sensory: No sensory deficit. Motor: No weakness. On this date 8/23/2022 I have spent 25 minutes reviewing previous notes, test results and face to face with the patient discussing the diagnosis and importance of compliance with the treatment plan as well as documenting on the day of the visit. An electronic signature was used to authenticate this note.     --Gabrielle Gray MD

## 2022-08-23 NOTE — PROGRESS NOTES
Visit Information    Have you changed or started any medications since your last visit including any over-the-counter medicines, vitamins, or herbal medicines? no   Have you stopped taking any of your medications? Is so, why? -  no  Are you having any side effects from any of your medications? - no    Have you seen any other physician or provider since your last visit?  no   Have you had any other diagnostic tests since your last visit?  no   Have you been seen in the emergency room and/or had an admission in a hospital since we last saw you?  no   Have you had your routine dental cleaning in the past 6 months?  no     Do you have an active MyChart account? If no, what is the barrier?   Yes    Patient Care Team:  Yamil Morocho MD as PCP - General (Emergency Medicine)  Stephy Husain DO as PCP - [facility]    Medical History Review  Past Medical, Family, and Social History reviewed and does not contribute to the patient presenting condition    Health Maintenance   Topic Date Due    Diabetic retinal exam  Never done    Diabetic foot exam  02/19/2022    Annual Wellness Visit (AWV)  05/07/2022    Flu vaccine (1) 09/01/2022    COVID-19 Vaccine (4 - Booster for Westly Poisson series) 09/21/2022    Colorectal Cancer Screen  02/10/2023    Lipids  04/20/2023    A1C test (Diabetic or Prediabetic)  08/01/2023    Depression Screen  08/01/2023    DTaP/Tdap/Td vaccine (4 - Td or Tdap) 07/10/2032    Hepatitis B vaccine  Completed    Shingles vaccine  Completed    Pneumococcal 0-64 years Vaccine  Completed    Hepatitis C screen  Completed    HIV screen  Completed    Hepatitis A vaccine  Aged Out    Hib vaccine  Aged Out    Meningococcal (ACWY) vaccine  Aged Out

## 2022-08-23 NOTE — PROGRESS NOTES
Attending Physician Statement  I have discussed the care of Anika Shaw 45 y.o. male, including pertinent history and exam findings, with the resident Dr. Pamela Riddle MD.    History and Exam:   Chief Complaint   Patient presents with    Hypertension     Patient states he is here for a medication check and was prescribed a new        Past Medical History:   Diagnosis Date    Hypertension     Obesity      No Known Allergies   I have seen and examined the patient and the key elements of the encounter have been performed by me. BP Readings from Last 3 Encounters:   08/23/22 (!) 179/95   08/01/22 132/76   04/28/22 131/85     BP (!) 179/95 (Site: Right Upper Arm, Position: Sitting, Cuff Size: Large Adult)   Pulse 61   Temp 97 °F (36.1 °C) (Temporal)   Ht 5' 10\" (1.778 m)   Wt (!) 307 lb 3.2 oz (139.3 kg)   BMI 44.08 kg/m²   Lab Results   Component Value Date    WBC 9.2 01/23/2018    HGB 11.7 (L) 01/23/2018    HCT 38.5 (L) 01/23/2018     01/23/2018    CHOL 203 (H) 04/20/2022    TRIG 187 (H) 04/20/2022    HDL 39 (L) 04/20/2022    ALT 13 04/20/2022    AST 15 04/20/2022     04/20/2022    K 4.9 04/20/2022     04/20/2022    CREATININE 1.22 (H) 04/20/2022    BUN 20 04/20/2022    CO2 23 04/20/2022    TSH 1.57 05/25/2017    LABA1C 7.1 08/01/2022    LABMICR 898 (H) 08/12/2021     Lab Results   Component Value Date    LABPROT 8.5 (H) 01/30/2013    LABALBU 4.4 04/20/2022     No results found for: IRON, TIBC, FERRITIN  Lab Results   Component Value Date    LDLCHOLESTEROL 127 04/20/2022     I agree with the assessment, plan and the diagnosis of    Diagnosis Orders   1. Essential hypertension  amLODIPine-valsartan (EXFORGE)  MG per tablet       . I agree with orders as documented by the resident. More than 25 minutes spent  in face to face encounter with the patient and more than half in counseling. Patient's questions were answered. Patient Voiced understanding to the counseling.   Return in about 2 weeks (around 9/6/2022) for BP follow up.    (GC Modifier)-Dr. Filipe Pereira MD

## 2022-08-25 ENCOUNTER — HOSPITAL ENCOUNTER (OUTPATIENT)
Dept: DIABETES SERVICES | Age: 52
Setting detail: THERAPIES SERIES
Discharge: HOME | End: 2022-08-25
Payer: MEDICARE

## 2022-08-25 PROCEDURE — G0108 DIAB MANAGE TRN  PER INDIV: HCPCS

## 2022-08-25 SDOH — ECONOMIC STABILITY: FOOD INSECURITY: ADDITIONAL INFORMATION: NO

## 2022-08-25 ASSESSMENT — PROBLEM AREAS IN DIABETES QUESTIONNAIRE (PAID)
FEELING SCARED WHEN YOU THINK ABOUT LIVING WITH DIABETES: 4
COPING WITH COMPLICATIONS OF DIABETES: 4
FEELING DEPRESSED WHEN YOU THINK ABOUT LIVING WITH DIABETES: 2
PAID-5 TOTAL SCORE: 15
WORRYING ABOUT THE FUTURE AND THE POSSIBILITY OF SERIOUS COMPLICATIONS: 4
FEELING THAT DIABETES IS TAKING UP TOO MUCH OF YOUR MENTAL AND PHYSICAL ENERGY EVERY DAY: 1

## 2022-08-25 NOTE — PROGRESS NOTES
John No, was evaluated through a synchronous (real-time) audio-video encounter. The patient (or guardian if applicable) is aware that this is a billable service, which includes applicable co-pays. This Virtual Visit was conducted with patient's (and/or legal guardian's) consent. The visit was conducted pursuant to the emergency declaration under the [facility], [address] [place] [address] authority and the [employer] General Act. Patient identification was verified, and a caregiver was present when appropriate. The patient was located in a state where the provider was licensed to provide care. Total time spent for this encounter: 60 minutes    Also see Diabetic Screening  Patient, klaudia Dacosta for diabetes self-management education  assessment on 8/25/22       MEDICAL HISTORY:  Past Medical History:   Diagnosis Date    Hypertension     Obesity      No family history on file. Patient has no known allergies. Immunization History   Administered Date(s) Administered    COVID-19, MODERNA BLUE border, Primary or Immunocompromised, (age 12y+), IM, 100 mcg/0.5mL 08/13/2021, 09/10/2021, 05/21/2022    Hepatitis B Adult (Engerix-B) 02/19/2021, 07/12/2021, 12/23/2021    Pneumococcal Polysaccharide (Baucnjpln20) 01/10/2017    Pneumococcal conjugate PCV20, PF (Prevnar 20) 07/19/2022    Tdap (Boostrix, Adacel) 02/01/2019, 07/05/2022, 07/10/2022    Zoster Recombinant (Shingrix) 03/08/2021, 05/21/2022     Current Medications  Current Outpatient Medications   Medication Sig Dispense Refill    amLODIPine-valsartan (EXFORGE)  MG per tablet Take 1 tablet by mouth daily 30 tablet 3    atorvastatin (LIPITOR) 40 MG tablet take 1 tablet by mouth once daily 30 tablet 3    hydrALAZINE (APRESOLINE) 50 MG tablet Take 1 tablet by mouth in the morning and 1 tablet at noon and 1 tablet in the evening and 1 tablet before bedtime.  90 tablet 2 gabapentin (NEURONTIN) 300 MG capsule Take 1 capsule by mouth in the morning and 1 capsule at noon and 1 capsule before bedtime. Do all this for 90 days. Intended supply: 90 days. 90 capsule 2    aspirin EC 81 MG EC tablet Take 1 tablet by mouth in the morning. 90 tablet 5    metFORMIN (GLUCOPHAGE) 500 MG tablet Take 1 tablet by mouth in the morning and 1 tablet in the evening. Take with meals. 180 tablet 1    carvedilol (COREG) 25 MG tablet take 1 tablet by mouth twice a day 60 tablet 2    baclofen (LIORESAL) 10 MG tablet Take 1 tablet by mouth nightly 30 tablet 0    acetaminophen (APAP EXTRA STRENGTH) 500 MG tablet Take 2 tablets by mouth every 6 hours as needed for Pain 180 tablet 1    allopurinol (ZYLOPRIM) 100 MG tablet take 1 tablet by mouth once daily 90 tablet 1    blood glucose monitor kit and supplies Dispense sufficient amount for indicated testing frequency plus additional to accommodate PRN testing needs. Dispense all needed supplies to include: monitor, strips, lancing device, lancets, control solutions, alcohol swabs. 1 kit 0    blood glucose monitor kit and supplies Dispense sufficient amount for indicated testing frequency plus additional to accommodate PRN testing needs. Dispense all needed supplies to include: monitor, strips, lancing device, lancets, control solutions, alcohol swabs.  1 kit 0    Lancets MISC 1 each by Does not apply route 3 times daily 600 each 1    Blood Pressure KIT 1 kit by Does not apply route daily 1 kit 0     No current facility-administered medications for this encounter.   :     Comments:  Allergies:  No Known Allergies    A1C blood level   Lab Results   Component Value Date    LABA1C 7.1 08/01/2022    LABA1C 6.1 01/06/2022    LABA1C 6.0 12/23/2021     Lab Results   Component Value Date    LABMICR 898 (H) 08/12/2021    CREATININE 1.22 (H) 04/20/2022       Blood pressure   BP Readings from Last 3 Encounters:   08/23/22 (!) 179/95   08/01/22 132/76   04/28/22 131/85 Cholesterol  Lab Results   Component Value Date    LDLCHOLESTEROL 127 04/20/2022        Diabetes Self- Management Education Record    Participant Name: Kacey Montes  Referring Provider: Rosa Suarez MD   Assessment/Evaluation Ratings:  1=Needs Instruction   4=Demonstrates Understanding/Competency  2=Needs Review   NC=Not Covered    3=Comprehends Key Points  N/A=Not Applicable  Topics/Learning Objectives Pre-session Assess Date:  8-25-22BB Instr. Date Reinforce Date Post- session Eval Comments   Diabetes disease process & Treatment process: Define diabetes & pre-diabetes; Identify own type of diabetes; role of the pancreas; signs/symptoms; diagnostic criteria; prevention & treatment options; contributing factors. 1       Incorporating nutritional management into lifestyle: Describe effect of type, amount & timing of food on blood glucose; Describe basic meal planning techniques & current nutrition guideline   1    What to eat - Food groups, When to eat - timing of meals and snacks, and How much to eat - portions control. calories/ day   CHO choices/ meal   CHO choices/  day   grams of protein /day   gram of fat /day     Correctly read food labels & demonstrate CHO counting & portion control with personalized meal plan. Identify dining out strategies, & dietary sick day guidelines. 1       Incorporating physical activity into lifestyle:   Verbalize effect of exercise on blood glucose levels; benefits of regular exercise; safety considerations; contraindications; maintenance of activity. 1    8-25-22 BB has bike and treadmill - uses daily. States \"I use it until my hip starts to hurt\". Typically about 15-20 minutes    Using medications safely:  Identify effects of diabetes medicines on blood glucose levels;  List diabetes medication taken, action & side effects;    1    8-25-22 BB recently started metformin 500mg bid - states that he is using it consistently and tolerating okay   Insulin / Injectable - Appropriate injection sites; proper storage; supplies needed; proper technique; safe needle disposal guidelines. 1NA       Monitoring blood glucose, interpreting and using results:  Identify recommended & personal blood glucose targets; importance of testing; testing supplies; HgbA1C target levels; Factors affecting blood glucose; Importance of logging blood glucose levels for pattern recognition; ketone testing; safe lancet disposal.   1    8-25-22 OMAR has Rx for glucometer. He states that [address] [place] [address] on [place] ([phone]) told him that \"insurance does not cover\". I called on speaker phone to inquire - no answer - I left a detailed message asking for a reply     States that he used his sister's glucometer once - BG was 129 fasting    Prevention, detection & treatment of acute complications:  Identify symptoms of hyper & hypoglycemia, and prevention & treatment strategies. 1       Describe sick day guidelines & indications for  physician notification. Identify short term consequences of poor control. Disaster preparedness strategies    1       Prevention, detection & treatment of chronic complications:  Define the natural course of diabetes & describe the relationship of blood glucose levels to long term complications of diabetes. Identify preventative measures & standards of care. 1    8-25-22 OMAR partial denture - goes to dentist only if necessary. Has been about 1 yr since last eye appt   Developing strategies to address psychosocial issues:  Describe feelings about living with diabetes; Describe how stress, depression & anxiety affect blood glucose; Identify coping strategies; Identify support needed & support network available. 1    8-25-22 OMAR lives alone, sister is helpful  Not much alcohol use, states uses marijuana to help him relax and help with pain    Developing strategies to promote health/change behavior: Identify 7 self-care behaviors; Personal health risk factors;  Benefits, challenges & strategies for behavioral change;    1    8-25-22 BB experienced significant wt loss in the past - was as high as 500lbs in 2005 - open to goal setting and behavior change     Individualized goal selection. My goal , to help me improve my health, I will:   Healthy Eating - increase non-starchy veggies      2. Healthy Eating - start to pay attention to food labels        Plan  Follow-up Appointments planned 9-8-22    Instruction Method: [x]Lecture/Discussion  []Power Point Presentation  [x]Handouts  []Return Demonstration    Education Materials/Equipment Provided (VIA Mail for phone visits)  :    [x]Self-Management - Initial assessment - Program explanation of session and cover letter for contact program and providers. PAID questionnaire  and one day diet history questionnaire  with postage paid envelope for patient to send back. []Self-Management  Class 1 -Self-Management  Class 1 - \"How to Thrive: A guide for Your Journey with Diabetes\" ADA booklet 2020 -pages 4, 11- 15 , 18 -23    You tube and website resource sheet-Understanding Type 2 Diabetes from Animated Diabetes Patient [email or web address] [email or web address]    [] Self-Management  Class 2 - \"How to Thrive: A guide for Your journey with Diabetes\" - ADA booklet 2020  - pages 12 -16  Handouts: Meal Plan, serving sizes how much food should I eat, Smarter snacking, lowdown on low - carb diets, supermarket savvy, Ready Set Carb Counting, Handout Reading Nutrition fact labels, 7 ways to size up your servings and Nutrition in the Fast Josh   fast facts about fast food (if available)     [] Self-Management  Class 3 -   \"How to Thrive: A guide for Your Journey with Diabetes\"  pages 6- 9 &  21 - 28,  Handouts:Type 2 diabetes and the role of GLP- 1, Know your numbers, Diabetes by the numbers, vaccinations for Adults with diabetes, how to care for your teeth and gums with diabetes, caring for your feet, how to pick the right shoes, foot care preventing diabetic foot ulcers, Individualized Diabetes report card ( sent via my chart and/ e mail)    [] Self-Management Class 4 - \"How to Thrive: A guide for Your journey with Diabetes\" - ADA booklet 2020  - pages 39 -44 -Handouts: Sick Day Rules, [employer], Diabetes and Alcohol, traveling with diabetes, Diabetes disaster preparedness plan, holidays and special occasions    --- SEND OUT after Class 4  -  Follow up PAID questionnaire  and  Program evaluation with postage paid return envelope. Program cookbook  and  Personal goal worksheet      []Self-Management - 3 month follow -  AADE7 Self care behaviors work sheets,  Online resource list - March 2020 , [place] community support program, Starting fresh program ( send out electronically) ,  How to Thrive: A guide for Your journey with Diabetes\" - ADA booklet 2020  - pages 44. []Self-Management  Gestational - RN class -Resource materials sent out : care booklet - \" Gestational Diabetes Mellitus ( GDM) toolkit form [place] gestational diabetes postpartum care learning collaborative 2018. \"Simple Guidelines for meal planning with gestational diabetes. SMBG sheets to fax back to [facility] weekly. BD  healthy injection site selection and rotation with 6 mm insulin syringe and 4 mm pen needle. Gestational diabetes handout from [facility] 2016. Did you have gestational diabetes when you were pregnant?  Handout from [facility]  April 2014    []Self-Management Gestational - RD class - My Food Plan for Gestational diabetes    []Glucose Meter     []Insulin Kit     []Other      Encounter Type Date Start Time End Time Comments No Show Dates   Assessment 8-25-22BB   11:30am 12:30pm   []In Person  [x]Telehealth    Class 1 - Understanding diabetes     []In Person   []Telehealth    Class 2- Nutrition and diabetes      []In Person   []Telehealth    Class 3 - Preventing Complications     []In Person   []Telehealth    Class 4 -  In depth Nutrition and sick day care    []In Person   []Telehealth Class 5 - 3 month follow up / goal reassessment    []In Person   []Telehealth    Gestational - RN         Gestational - RD        Individual MNT         Shared Med Appt         Yearly Follow-up        Meter Instrx      How to Measure Your Blood Sugar - [facility] Patient Education  [email or web address]://[email or web address] be/[email or web address]    Insulin Instrx      []Pen  []Vial & Syringe   BD Diabetes Care: How to Inject Insulin with a Pen Needle  https://[email or web address] [email or web address]    Diabetes Care: How to Inject Insulin with a Syringe  https://[email or web address] be/[email or web address]         DSMS Support :   [] MNT      [] Annual update     [] Starting Fresh  adults living with diabetes or pre diabetes. [address] [facility] [place] [phone] call for dates    []  Diabetes Group at  [place] [address] of [place]ers - Free 6 week diabetes education support   classes - use web site interest form found at  Eventdoo.pt - to enroll       []ADA  Where do I Begin, Living with Type 2 diabetes ADA home support program  Web site: diabetes. org/living    Call: 1800 DIABETES  e-mail: [email or web address] [email or web address]     []  Internet web sites - ADAWeb site: diabetes. org and diabetesfoodhub.org      Post Education Referrals:      [] [address] information sheet and [address] [facility] , [address]       [] Dental care - [facility] of [facility]     [] [address] link  phone number - for information and referral to [address]  Clinically  [address] [place] [address], FOOT, CARDIAC, WOUND, WEIGHT MANAGEMENT        []Other  SILVER PLATA RN

## 2022-08-25 NOTE — LETTER
[facility]  [address]  [facility] [address]  Phone: [phone]         August 25, 2022    To Shira Gregory MD  From: Isabel Philippe RN    Patient: Moisés Horne   YOB: 1970   Date of Visit: 8/25/22     An initial assessment to determine diabetes education needs was completed. Education included:interpretation of lab results, blood sugar goals, complications of diabetes mellitus, hypoglycemia prevention and treatment, exercise, illness management, self-monitoring of blood glucose skills and nutrition. An ongoing plan was created to include: Individual follow up    Thank you for the opportunity to provide Diabetes Self Management Education to your patient. Moisés Horne completed a Diabetes Self- Management Education Assessment on 8/25/22. Part of our assessment is having the patient complete the PAID (Problem Areas in Diabetes Scale)-5 survey. This tool  measures diabetes-related emotional distress a patient may be feeling. Moisés Horne scored 15   A total score of >8 indicates possible diabetes related emotional distress, which warrants further assessment and a referral to mental health professional for psychological support and treatment.

## 2022-09-08 ENCOUNTER — APPOINTMENT (OUTPATIENT)
Dept: DIABETES SERVICES | Age: 52
End: 2022-09-08
Payer: MEDICARE

## 2022-09-09 DIAGNOSIS — I10 ESSENTIAL HYPERTENSION: ICD-10-CM

## 2022-09-09 RX ORDER — LABETALOL 300 MG/1
TABLET, FILM COATED ORAL
Qty: 240 TABLET | Refills: 5 | OUTPATIENT
Start: 2022-09-09

## 2022-09-09 NOTE — TELEPHONE ENCOUNTER
Please address the medication refill and close the encounter. If I can be of assistance, please route to the applicable pool. Thank you. Last visit: 8-23-22  Last Med refill: 6-1-2020  Does patient have enough medication for 72 hours: No:     Next Visit Date:  Future Appointments   Date Time Provider Lizette Marques   9/12/2022 12:45 PM Jayda Suarez MD [address] [place] [address]   9/14/2022  8:30 AM Elyssa Samuels RN [facility] DIAB ED [facility]   10/13/2022  8:30 AM [facility] [address] Maintenance   Topic Date Due    Diabetic retinal exam  Never done    Diabetic foot exam  02/19/2022    Annual Wellness Visit (AWV)  05/07/2022    Flu vaccine (1) Never done    COVID-19 Vaccine (4 - Booster for Moderna series) 09/21/2022    Colorectal Cancer Screen  02/10/2023    Lipids  04/20/2023    A1C test (Diabetic or Prediabetic)  08/01/2023    Depression Screen  08/01/2023    DTaP/Tdap/Td vaccine (4 - Td or Tdap) 07/10/2032    Hepatitis B vaccine  Completed    Shingles vaccine  Completed    Pneumococcal 0-64 years Vaccine  Completed    Hepatitis C screen  Completed    HIV screen  Completed    Hepatitis A vaccine  Aged Out    Hib vaccine  Aged Out    Meningococcal (ACWY) vaccine  Aged Out       Hemoglobin A1C (%)   Date Value   08/01/2022 7.1   01/06/2022 6.1   12/23/2021 6.0             ( goal A1C is < 7)   Microalb/Crt.  Ratio (mcg/mg creat)   Date Value   08/12/2021 898 (H)     LDL Cholesterol (mg/dL)   Date Value   04/20/2022 127   09/22/2020 67       (goal LDL is <100)   AST (U/L)   Date Value   04/20/2022 15     ALT (U/L)   Date Value   04/20/2022 13     BUN (mg/dL)   Date Value   04/20/2022 20     BP Readings from Last 3 Encounters:   08/23/22 (!) 179/95   08/01/22 132/76   04/28/22 131/85          (goal 120/80)    All Future Testing planned in [facility]  Lab Frequency Next Occurrence   Hemoglobin A1C Once 09/16/2022   Creatinine, Serum Once 12/23/2022               Patient Active Problem List:     Essential hypertension     Chronic back pain     Morbid obesity with BMI of 50.0-59.9, adult ([facility])     ZURDO (obstructive sleep apnea)     Gout     CKD (chronic kidney disease)     Mixed hyperlipidemia     Squamous cell carcinoma of face     Type 2 diabetes mellitus without complication, without long-term current use of insulin ([facility]     Need for prophylactic vaccination and inoculation against varicella     Vitamin D deficiency     BMI 50.0-59.9, adult ([facility]     Type 2 diabetes mellitus with hyperglycemia     Diabetes mellitus ([facility]     Back spasm     Secondary hypertension

## 2022-09-14 ENCOUNTER — HOSPITAL ENCOUNTER (OUTPATIENT)
Dept: DIABETES SERVICES | Age: 52
Setting detail: THERAPIES SERIES
Discharge: HOME | End: 2022-09-14
Payer: MEDICARE

## 2022-09-14 PROCEDURE — G0108 DIAB MANAGE TRN  PER INDIV: HCPCS

## 2022-09-14 NOTE — PROGRESS NOTES
Radha Hill, was evaluated through a synchronous (real-time) audio-video encounter. The patient (or guardian if applicable) is aware that this is a billable service, which includes applicable co-pays. This Virtual Visit was conducted with patient's (and/or legal guardian's) consent. The visit was conducted pursuant to the emergency declaration under the [address] [place] [address] authority and the [employer] and [place] [employer] Act. Patient identification was verified, and a caregiver was present when appropriate. The patient was located in a state where the provider was licensed to provide care. Total time spent for this encounter: 60 minutes    Also see Diabetic Screening  Patient, Radha Hill, virtual for diabetes self-management education  assessment on 8/25/22       MEDICAL HISTORY:  Past Medical History:   Diagnosis Date    Hypertension     Obesity      No family history on file. Patient has no known allergies. Immunization History   Administered Date(s) Administered    COVID-19, MODERNA BLUE border, Primary or Immunocompromised, (age 12y+), IM, 100 mcg/0.5mL 08/13/2021, 09/10/2021, 05/21/2022    Hepatitis B Adult (Engerix-B) 02/19/2021, 07/12/2021, 12/23/2021    Pneumococcal Polysaccharide ([id number]) 01/10/2017    Pneumococcal conjugate PCV20, PF (Prevnar 20) 07/19/2022    Tdap (Boostrix, Adacel) 02/01/2019, 07/05/2022, 07/10/2022    Zoster Recombinant (Shingrix) 03/08/2021, 05/21/2022     Current Medications  Current Outpatient Medications   Medication Sig Dispense Refill    amLODIPine-valsartan (EXFORGE)  MG per tablet Take 1 tablet by mouth daily 30 tablet 3    atorvastatin (LIPITOR) 40 MG tablet take 1 tablet by mouth once daily 30 tablet 3    hydrALAZINE (APRESOLINE) 50 MG tablet Take 1 tablet by mouth in the morning and 1 tablet at noon and 1 tablet in the evening and 1 tablet before bedtime.  90 tablet 2 gabapentin (NEURONTIN) 300 MG capsule Take 1 capsule by mouth in the morning and 1 capsule at noon and 1 capsule before bedtime. Do all this for 90 days. Intended supply: 90 days. 90 capsule 2    aspirin EC 81 MG EC tablet Take 1 tablet by mouth in the morning. 90 tablet 5    metFORMIN (GLUCOPHAGE) 500 MG tablet Take 1 tablet by mouth in the morning and 1 tablet in the evening. Take with meals. 180 tablet 1    carvedilol (COREG) 25 MG tablet take 1 tablet by mouth twice a day 60 tablet 2    baclofen (LIORESAL) 10 MG tablet Take 1 tablet by mouth nightly 30 tablet 0    acetaminophen (APAP EXTRA STRENGTH) 500 MG tablet Take 2 tablets by mouth every 6 hours as needed for Pain 180 tablet 1    allopurinol (ZYLOPRIM) 100 MG tablet take 1 tablet by mouth once daily 90 tablet 1    blood glucose monitor kit and supplies Dispense sufficient amount for indicated testing frequency plus additional to accommodate PRN testing needs. Dispense all needed supplies to include: monitor, strips, lancing device, lancets, control solutions, alcohol swabs. 1 kit 0    blood glucose monitor kit and supplies Dispense sufficient amount for indicated testing frequency plus additional to accommodate PRN testing needs. Dispense all needed supplies to include: monitor, strips, lancing device, lancets, control solutions, alcohol swabs.  1 kit 0    Lancets MISC 1 each by Does not apply route 3 times daily 600 each 1    Blood Pressure KIT 1 kit by Does not apply route daily 1 kit 0     No current facility-administered medications for this encounter.   :     Comments:  Allergies:  No Known Allergies    A1C blood level   Lab Results   Component Value Date    LABA1C 7.1 08/01/2022    LABA1C 6.1 01/06/2022    LABA1C 6.0 12/23/2021     Lab Results   Component Value Date    LABMICR 898 (H) 08/12/2021    CREATININE 1.22 (H) 04/20/2022       Blood pressure   BP Readings from Last 3 Encounters:   08/23/22 (!) 179/95   08/01/22 132/76   04/28/22 131/85 Cholesterol  Lab Results   Component Value Date    LDLCHOLESTEROL 127 04/20/2022        Diabetes Self- Management Education Record    Participant Name: Patience Schaffer  Referring Provider: Luis Enrique Estrella MD   Assessment/Evaluation Ratings:  1=Needs Instruction   4=Demonstrates Understanding/Competency  2=Needs Review   NC=Not Covered    3=Comprehends Key Points  N/A=Not Applicable  Topics/Learning Objectives Pre-session Assess Date:  8-25-22BB Instr. Date Reinforce Date Post- session Eval Comments   Diabetes disease process & Treatment process: Define diabetes & pre-diabetes; Identify own type of diabetes; role of the pancreas; signs/symptoms; diagnostic criteria; prevention & treatment options; contributing factors. 1 9-14-22BB      Incorporating nutritional management into lifestyle: Describe effect of type, amount & timing of food on blood glucose; Describe basic meal planning techniques & current nutrition guideline   1    What to eat - Food groups, When to eat - timing of meals and snacks, and How much to eat - portions control. calories/ day   CHO choices/ meal   CHO choices/  day   grams of protein /day   gram of fat /day     Correctly read food labels & demonstrate CHO counting & portion control with personalized meal plan. Identify dining out strategies, & dietary sick day guidelines. 1    9-14-22 BB describes himself as having a \"sweet tooth\". Likes Alexi & other candy. Brainstormed some alternative \"dessert\" strategies with patient. SF chocolate pudding swirling in some PB. PB on apple with dark lisa chips. Try PB cookie recipe from Diabetes [employer] (left recipe for pt - he may pick it up with a complimentary glucometer)    Incorporating physical activity into lifestyle:   Verbalize effect of exercise on blood glucose levels; benefits of regular exercise; safety considerations; contraindications; maintenance of activity. 1 9-14-22BB 8-25-22 BB has bike and treadmill - uses daily.  States eye appt   Developing strategies to address psychosocial issues:  Describe feelings about living with diabetes; Describe how stress, depression & anxiety affect blood glucose; Identify coping strategies; Identify support needed & support network available. 1 9-14-22BB   8-25-22 OMAR lives alone, sister is helpful  Not much alcohol use, states uses marijuana to help him relax and help with pain    Developing strategies to promote health/change behavior: Identify 7 self-care behaviors; Personal health risk factors; Benefits, challenges & strategies for behavioral change;    1    8-25-22 OMAR experienced significant wt loss in the past - was as high as 500lbs in 2005 - open to goal setting and behavior change     Individualized goal selection. My goal , to help me improve my health, I will:   Healthy Eating - increase non-starchy veggies      2. Healthy Eating - start to pay attention to food labels        Plan  Follow-up Appointments planned 9-30-22    Instruction Method: [x]Lecture/Discussion  []Power Point Presentation  [x]Handouts  []Return Demonstration    Education Materials/Equipment Provided (VIA Mail for phone visits)  :    [x]Self-Management - Initial assessment - Program explanation of session and cover letter for contact program and providers. PAID questionnaire  and one day diet history questionnaire  with postage paid envelope for patient to send back. [x]Self-Management  Class 1 -Self-Management  Class 1 - \"How to Thrive: A guide for Your Journey with Diabetes\" ADA booklet 2020 -pages 4, 11- 15 , 18 -23    You tube and website resource sheet-Understanding Type 2 Diabetes from Animated Diabetes Patient [email or web address] be/[email or web address]    [] Self-Management  Class 2 - \"How to Thrive: A guide for Your journey with Diabetes\" - ADA booklet 2020  - pages 16 -17  Handouts: Meal Plan, serving sizes how much food should I eat, Smarter snacking, lowdown on low - carb diets, supermarket savvy, Ready Set Carb Counting, Handout Reading Nutrition fact labels, 7 ways to size up your servings and Nutrition in the Fast Josh   fast facts about fast food (if available)     [] Self-Management  Class 3 -   \"How to Thrive: A guide for Your Journey with Diabetes\"  pages 6- 9 &  21 - 28,  Handouts:Type 2 diabetes and the role of GLP- 1, Know your numbers, Diabetes by the numbers, vaccinations for Adults with diabetes, how to care for your teeth and gums with diabetes, caring for your feet, how to pick the right shoes, foot care preventing diabetic foot ulcers, Individualized Diabetes report card ( sent via my chart and/ e mail)    [] Self-Management Class 4 - \"How to Thrive: A guide for Your journey with Diabetes\" - ADA booklet 2020  - pages 39 -44 -Handouts: Sick Day Rules, [employer], Diabetes and Alcohol, traveling with diabetes, Diabetes disaster preparedness plan, holidays and special occasions    --- SEND OUT after Class 4  -  Follow up PAID questionnaire  and  Program evaluation with postage paid return envelope. Program cookbook  and  Personal goal worksheet      []Self-Management - 3 month follow -  AADE7 Self care behaviors work sheets,  Online resource list - March 2020 , [employer] community support program, Starting fresh program ( send out electronically) ,  How to Thrive: A guide for Your journey with Diabetes\" - ADA booklet 2020  - pages 44. []Self-Management  Gestational - RN class -Resource materials sent out : care booklet - \" Gestational Diabetes Mellitus ( GDM) toolkit form [place] gestational diabetes postpartum care learning collaborative 2018. \"Simple Guidelines for meal planning with gestational diabetes. SMBG sheets to fax back to MFM weekly. BD  healthy injection site selection and rotation with 6 mm insulin syringe and 4 mm pen needle. Gestational diabetes handout from [facility] 2016. Did you have gestational diabetes when you were pregnant?  Handout from [facility]  April 2014    []Self-Management Gestational - RD class - My Food Plan for Gestational diabetes    []Glucose Meter     []Insulin Kit     []Other      Encounter Type Date Start Time End Time Comments No Show Dates   Assessment 8-25-22BB   11:30am 12:30pm   []In Person  [x]Telehealth    Class 1 - Understanding diabetes 9-14-22BB 8:30am 9:30am  []In Person   [x]Telehealth    Class 2- Nutrition and diabetes      []In Person   []Telehealth    Class 3 - Preventing Complications     []In Person   []Telehealth    Class 4 -  In depth Nutrition and sick day care    []In Person   []Telehealth    Class 5 - 3 month follow up / goal reassessment    []In Person   []Telehealth    Gestational - RN         Gestational - RD        Individual MNT         Shared Med Appt         Yearly Follow-up        Meter Instrx      How to Measure Your Blood Sugar - [facility] Patient Education  [email or web address] [email or web address]    Insulin Instrx      []Pen  []Vial & Syringe   BD Diabetes Care: How to Inject Insulin with a Pen Needle  [email or web address]    Diabetes Care: How to Inject Insulin with a Syringe  [email or web address]         DSMS Support :   [] MNT      [] Annual update     [] Starting Fresh  adults living with diabetes or pre diabetes. [address] [place] [address] [phone] call for dates    []  Diabetes Group at  [address] jeffers - Free 6 week diabetes education support   classes - use web site interest form found at  Robotronica.pt - to enroll       []ADA  Where do I Begin, Living with Type 2 diabetes ADA home support program  Web site: diabetes. org/living    Call: 1800 DIABETES  e-mail: Jer@Tealeaf. org     []  Internet web sites - ADAWeb site: diabetes. org and diabetesfoodhub.org      Post Education Referrals:      [] [place] Tobacco Quit information sheet and [address] [facility] , [address] [phone]      [] Dental care - Dental care of [facility]     [] [facility] ([facility]) link  phone number - for information and referral to [facility] Estela AMBROCIO  Clinically  [address], FOOT, CARDIAC, WOUND, WEIGHT MANAGEMENT        []Other  SILVER PLATA RN

## 2022-10-10 ENCOUNTER — OFFICE VISIT (OUTPATIENT)
Dept: PAIN MANAGEMENT | Age: 52
End: 2022-10-10
Payer: MEDICARE

## 2022-10-10 VITALS — WEIGHT: 300 LBS | BODY MASS INDEX: 42.95 KG/M2 | HEIGHT: 70 IN

## 2022-10-10 DIAGNOSIS — M47.817 LUMBOSACRAL SPONDYLOSIS WITHOUT MYELOPATHY: Primary | ICD-10-CM

## 2022-10-10 DIAGNOSIS — M54.17 LUMBOSACRAL NEURITIS: ICD-10-CM

## 2022-10-10 PROCEDURE — 3017F COLORECTAL CA SCREEN DOC REV: CPT | Performed by: PAIN MEDICINE

## 2022-10-10 PROCEDURE — G8484 FLU IMMUNIZE NO ADMIN: HCPCS | Performed by: PAIN MEDICINE

## 2022-10-10 PROCEDURE — 99204 OFFICE O/P NEW MOD 45 MIN: CPT | Performed by: PAIN MEDICINE

## 2022-10-10 PROCEDURE — G8417 CALC BMI ABV UP PARAM F/U: HCPCS | Performed by: PAIN MEDICINE

## 2022-10-10 PROCEDURE — 1036F TOBACCO NON-USER: CPT | Performed by: PAIN MEDICINE

## 2022-10-10 PROCEDURE — G8427 DOCREV CUR MEDS BY ELIG CLIN: HCPCS | Performed by: PAIN MEDICINE

## 2022-10-10 ASSESSMENT — ENCOUNTER SYMPTOMS: BACK PAIN: 1

## 2022-10-10 NOTE — PROGRESS NOTES
HPI:     Back Pain  This is a chronic problem. The current episode started more than 1 year ago. The problem occurs constantly. The problem has been waxing and waning since onset. The pain is present in the lumbar spine. The quality of the pain is described as burning (throbbing). The pain radiates to the left knee and left thigh. The pain is at a severity of 8/10. The pain is severe. The pain is The same all the time. The symptoms are aggravated by bending, position, twisting, standing and sitting. Stiffness is present All day. Associated symptoms include leg pain, numbness and tingling. Pertinent negatives include no weakness. Risk factors include obesity. He has tried heat for the symptoms. Pain in the low back down the right leg. X-ray with degenerative changes. He has seen the surgical team.  Nothing surgical planned at this time. Patient denies any new neurological symptoms. No bowel or bladder incontinence, no weakness, and no falling. Review of OARRS does not show any aberrant prescription behavior. Past Medical History:   Diagnosis Date    Hypertension     Obesity        No past surgical history on file. No Known Allergies      Current Outpatient Medications:     Cholecalciferol 50 MCG (2000 UT) CAPS, Take 1 capsule by mouth daily, Disp: , Rfl:     amLODIPine-valsartan (EXFORGE)  MG per tablet, Take 1 tablet by mouth daily, Disp: 30 tablet, Rfl: 3    atorvastatin (LIPITOR) 40 MG tablet, take 1 tablet by mouth once daily, Disp: 30 tablet, Rfl: 3    hydrALAZINE (APRESOLINE) 50 MG tablet, Take 1 tablet by mouth in the morning and 1 tablet at noon and 1 tablet in the evening and 1 tablet before bedtime. , Disp: 90 tablet, Rfl: 2    gabapentin (NEURONTIN) 300 MG capsule, Take 1 capsule by mouth in the morning and 1 capsule at noon and 1 capsule before bedtime. Do all this for 90 days. Intended supply: 90 days. , Disp: 90 capsule, Rfl: 2    aspirin EC 81 MG EC tablet, Take 1 tablet by mouth in the morning., Disp: 90 tablet, Rfl: 5    metFORMIN (GLUCOPHAGE) 500 MG tablet, Take 1 tablet by mouth in the morning and 1 tablet in the evening. Take with meals. , Disp: 180 tablet, Rfl: 1    carvedilol (COREG) 25 MG tablet, take 1 tablet by mouth twice a day, Disp: 60 tablet, Rfl: 2    baclofen (LIORESAL) 10 MG tablet, Take 1 tablet by mouth nightly, Disp: 30 tablet, Rfl: 0    acetaminophen (APAP EXTRA STRENGTH) 500 MG tablet, Take 2 tablets by mouth every 6 hours as needed for Pain, Disp: 180 tablet, Rfl: 1    allopurinol (ZYLOPRIM) 100 MG tablet, take 1 tablet by mouth once daily, Disp: 90 tablet, Rfl: 1    blood glucose monitor kit and supplies, Dispense sufficient amount for indicated testing frequency plus additional to accommodate PRN testing needs. Dispense all needed supplies to include: monitor, strips, lancing device, lancets, control solutions, alcohol swabs., Disp: 1 kit, Rfl: 0    blood glucose monitor kit and supplies, Dispense sufficient amount for indicated testing frequency plus additional to accommodate PRN testing needs. Dispense all needed supplies to include: monitor, strips, lancing device, lancets, control solutions, alcohol swabs., Disp: 1 kit, Rfl: 0    Lancets MISC, 1 each by Does not apply route 3 times daily, Disp: 600 each, Rfl: 1    Blood Pressure KIT, 1 kit by Does not apply route daily, Disp: 1 kit, Rfl: 0    No family history on file.     Social History     Socioeconomic History    Marital status: Single     Spouse name: Not on file    Number of children: Not on file    Years of education: Not on file    Highest education level: Not on file   Occupational History    Not on file   Tobacco Use    Smoking status: Former     Packs/day: 0.25     Years: 5.00     Pack years: 1.25     Types: Cigarettes     Quit date: 2008     Years since quittin.5    Smokeless tobacco: Never   Substance and Sexual Activity    Alcohol use: Yes    Drug use: Yes     Frequency: 7.0 times per week Types: Marijuana Zach Snow)    Sexual activity: Not on file   Other Topics Concern    Not on file   Social History Narrative    Not on file     Social Determinants of Health     Financial Resource Strain: Low Risk     Difficulty of Paying Living Expenses: Not hard at all   Food Insecurity: Food Insecurity Present    Worried About [address] in the Last Year: Often true    Ran Out of Food in the Last Year: Often true   Transportation Needs: Not on file   Physical Activity: Not on file   Stress: Not on file   Social Connections: Not on file   Intimate Partner Violence: Not on file   Housing Stability: Not on file       Review of Systems:  Review of Systems   Musculoskeletal:  Positive for back pain. Neurological:  Positive for numbness and tingling. Negative for weakness. Physical Exam:  Ht 5' 10\" (1.778 m)   Wt 300 lb (136.1 kg)   BMI 43.05 kg/m²     Physical Exam  Constitutional:       Appearance: Normal appearance. Pulmonary:      Effort: Pulmonary effort is normal.   Neurological:      Mental Status: He is alert. Psychiatric:         Attention and Perception: Attention and perception normal.         Mood and Affect: Mood and affect normal.       Record/Diagnostics Review:    As above, I did independently review the imaging    Orders:  Orders Placed This Encounter   Procedures    MRI LUMBAR SPINE [address]    Ambulatory referral to Physical Therapy       Assessment:  1. Lumbosacral spondylosis without myelopathy    2. Lumbosacral neuritis        Treatment Plan:  DISCUSSION: Treatment options discussed with patient and all questions answered to patient's satisfaction. OARRS Review: Reviewed and acceptable for medications prescribed. TREATMENT OPTIONS:     Discussed different treatment options including continued conservative care such as physical therapy, chiropractic care, acupuncture. Discussed different interventional options such as epidural steroids or medial branch blocks.   Also discussed neuromodulation in the form of spinal cord stimulation. Also discussed surgical evaluation. Low back down the right leg has been quite persistent, will obtain an MRI of the lumbar spine to further evaluate pathology and guide treatment plan. Discussed the importance of continued physical therapy and exercise program as well. Scott Abreu M.D. I have reviewed the chief complaint and history of present illness (including ROS and PFSH) and vital documentation by my staff and I agree with their documentation and have added where applicable.

## 2022-10-12 DIAGNOSIS — I10 ESSENTIAL HYPERTENSION: ICD-10-CM

## 2022-10-12 RX ORDER — CARVEDILOL 25 MG/1
TABLET ORAL
Qty: 60 TABLET | Refills: 2 | Status: SHIPPED | OUTPATIENT
Start: 2022-10-12

## 2022-10-12 NOTE — TELEPHONE ENCOUNTER
Last visit: 8/23/22  Last Med refill: 9/11/22  Does patient have enough medication for 72 hours: No:     Next Visit Date:  Future Appointments   Date Time Provider Lizette Marques   10/13/2022  8:30 AM [facility]   83/02/8146  2:34 AM Keegan Garrett PT [facility]   11/14/2022  1:45 PM Luis Liang MD [address] [facility]   11/28/2022 10:00 AM Tigre Mcneil MD [address] Maintenance   Topic Date Due    Diabetic retinal exam  Never done    Diabetic foot exam  02/19/2022    Annual Wellness Visit (AWV)  05/07/2022    Flu vaccine (1) Never done    Colorectal Cancer Screen  02/10/2023    Lipids  04/20/2023    A1C test (Diabetic or Prediabetic)  08/01/2023    Depression Screen  08/01/2023    DTaP/Tdap/Td vaccine (4 - Td or Tdap) 07/10/2032    Hepatitis B vaccine  Completed    Shingles vaccine  Completed    Pneumococcal 0-64 years Vaccine  Completed    COVID-19 Vaccine  Completed    Hepatitis C screen  Completed    HIV screen  Completed    Hepatitis A vaccine  Aged Out    Hib vaccine  Aged Out    Meningococcal (ACWY) vaccine  Aged Out       Hemoglobin A1C (%)   Date Value   08/01/2022 7.1   01/06/2022 6.1   12/23/2021 6.0             ( goal A1C is < 7)   Microalb/Crt.  Ratio (mcg/mg creat)   Date Value   08/12/2021 898 (H)     LDL Cholesterol (mg/dL)   Date Value   04/20/2022 127   09/22/2020 67       (goal LDL is <100)   AST (U/L)   Date Value   04/20/2022 15     ALT (U/L)   Date Value   04/20/2022 13     BUN (mg/dL)   Date Value   04/20/2022 20     BP Readings from Last 3 Encounters:   08/23/22 (!) 179/95   08/01/22 132/76   04/28/22 131/85          (goal 120/80)    All Future Testing planned in [facility]  Lab Frequency Next Occurrence   Creatinine, Serum Once 12/23/2022   MRI LUMBAR SPINE WO CONTRAST Once 10/10/2022               Patient Active Problem List:     Essential hypertension     Chronic back pain     Morbid obesity with BMI of 50.0-59.9, adult ([facility]     ZURDO (obstructive sleep apnea)     Gout     CKD (chronic kidney disease)     Mixed hyperlipidemia     Squamous cell carcinoma of face     Type 2 diabetes mellitus without complication, without long-term current use of insulin ([facility]     Need for prophylactic vaccination and inoculation against varicella     Vitamin D deficiency     BMI 50.0-59.9, adult ([facility]     Type 2 diabetes mellitus with hyperglycemia     Diabetes mellitus ([facility]     Back spasm     Secondary hypertension

## 2022-10-19 ENCOUNTER — HOSPITAL ENCOUNTER (OUTPATIENT)
Dept: PHYSICAL THERAPY | Facility: CLINIC | Age: 52
Setting detail: THERAPIES SERIES
Discharge: HOME | End: 2022-10-19
Payer: MEDICARE

## 2022-10-19 PROCEDURE — 97110 THERAPEUTIC EXERCISES: CPT

## 2022-10-19 PROCEDURE — 97162 PT EVAL MOD COMPLEX 30 MIN: CPT

## 2022-10-19 PROCEDURE — 97140 MANUAL THERAPY 1/> REGIONS: CPT
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
## 2022-10-20 DIAGNOSIS — I10 ESSENTIAL HYPERTENSION: ICD-10-CM

## 2022-10-20 RX ORDER — AMLODIPINE BESYLATE 10 MG/1
10 TABLET ORAL NIGHTLY
Qty: 90 TABLET | Refills: 5 | Status: SHIPPED | OUTPATIENT
Start: 2022-10-20

## 2022-10-20 NOTE — TELEPHONE ENCOUNTER
E-scribe request for med refill. Please review and e-scribe if applicable. Last Visit Date:  08/23/2022  Next Visit Date:  11/14/2022    Hemoglobin A1C (%)   Date Value   08/01/2022 7.1   01/06/2022 6.1   12/23/2021 6.0             ( goal A1C is < 7)   Microalb/Crt.  Ratio (mcg/mg creat)   Date Value   08/12/2021 898 (H)     LDL Cholesterol (mg/dL)   Date Value   04/20/2022 127       (goal LDL is <100)   AST (U/L)   Date Value   04/20/2022 15     ALT (U/L)   Date Value   04/20/2022 13     BUN (mg/dL)   Date Value   04/20/2022 20     BP Readings from Last 3 Encounters:   08/23/22 (!) 179/95   08/01/22 132/76   04/28/22 131/85          (goal 120/80)        Patient Active Problem List:     Essential hypertension     Chronic back pain     Morbid obesity with BMI of 50.0-59.9, adult ([facility])     ZURDO (obstructive sleep apnea)     Gout     CKD (chronic kidney disease)     Mixed hyperlipidemia     Squamous cell carcinoma of face     Type 2 diabetes mellitus without complication, without long-term current use of insulin ([facility]     Need for prophylactic vaccination and inoculation against varicella     Vitamin D deficiency     BMI 50.0-59.9, adult ([address]     Type 2 diabetes mellitus with hyperglycemia     Diabetes mellitus ([address]     Back spasm     Secondary hypertension      ----Dorothy Queen

## 2022-10-20 NOTE — PRE-CERTIFICATION NOTE
Medicare Cap   [x] Physical Therapy  [] Speech Therapy  [] Occupational therapy  *PT and Speech caps combine      $2150 Limit for PT and Speech combined  $2150 Limit for OT individually  At the beginning of the month where you expect to go over $2150, please add the [address] modifier      Patient Name: Inessa Jacques  YOB: 1970    Note:  This is an estimate of charges billed.      Date of [address] Name # units/ charge $$$ charge Daily Total Charge Ongoing Total $$$   10/19/22 ALEXI George,MT 1+1+1 97.02+22.29+20.83 140.12 140.12

## 2022-10-20 NOTE — FLOWSHEET NOTE
Maddy Fall Risk Assessment    Patient Name:  Koko Butt  : 1970    Risk Factor Scale  Score   History of Falls [] Yes  [x] No 25  0 0   Secondary Diagnosis [] Yes  [x] No 15  0 0   Ambulatory Aid [] Furniture  [] Crutches/cane/walker  [x] None/bedrest/wheelchair/nurse 30  15  0 0   IV/Heparin Lock [] Yes  [x] No 20  0 0   Gait/Transferring [] Impaired  [] Weak  [x] Normal/bedrest/immobile 20  10  0 0   Mental Status [] Forgets limitations  [x] Oriented to own ability 15  0 0      Total:0     Based on the Assessment score: check the appropriate box.     [x]  No intervention needed   Low =   Score of 0-24    []  Use standard prevention interventions Moderate =  Score of 24-44   [] Give patient handout and discuss fall prevention strategies   [] Establish goal of education for patient/family RE: fall prevention strategies    []  Use high risk prevention interventions High = Score of 45 and higher   [] Give patient handout and discuss fall prevention strategies   [] Establish goal of education for patient/family Re: fall prevention strategies   [] Discuss lifeline / other resources    Electronically signed by:   India Garcia PT  Date: 10/20/2022

## 2022-11-09 DIAGNOSIS — I15.9 SECONDARY HYPERTENSION: ICD-10-CM

## 2022-11-09 RX ORDER — HYDRALAZINE HYDROCHLORIDE 50 MG/1
50 TABLET, FILM COATED ORAL 4 TIMES DAILY
Qty: 120 TABLET | Refills: 3 | Status: SHIPPED | OUTPATIENT
Start: 2022-11-09 | End: 2023-02-07

## 2022-11-09 NOTE — TELEPHONE ENCOUNTER
E-scribe request for med refills. Please review and e-scribe if applicable. Last Visit Date:  8/23/22  Next Visit Date:  11/14/2022    Hemoglobin A1C (%)   Date Value   08/01/2022 7.1   01/06/2022 6.1   12/23/2021 6.0             ( goal A1C is < 7)   Microalb/Crt.  Ratio (mcg/mg creat)   Date Value   08/12/2021 898 (H)     LDL Cholesterol (mg/dL)   Date Value   04/20/2022 127       (goal LDL is <100)   AST (U/L)   Date Value   04/20/2022 15     ALT (U/L)   Date Value   04/20/2022 13     BUN (mg/dL)   Date Value   04/20/2022 20     BP Readings from Last 3 Encounters:   08/23/22 (!) 179/95   08/01/22 132/76   04/28/22 131/85          (goal 120/80)        Patient Active Problem List:     Essential hypertension     Chronic back pain     Morbid obesity with BMI of 50.0-59.9, adult ([facility])     ZURDO (obstructive sleep apnea)     Gout     CKD (chronic kidney disease)     Mixed hyperlipidemia     Squamous cell carcinoma of face     Type 2 diabetes mellitus without complication, without long-term current use of insulin ([address]     Need for prophylactic vaccination and inoculation against varicella     Vitamin D deficiency     BMI 50.0-59.9, adult ([address]     Type 2 diabetes mellitus with hyperglycemia     Diabetes mellitus ([address]     Back spasm     Secondary hypertension      ----Severino Jeffery
Home

## 2022-11-14 ENCOUNTER — HOSPITAL ENCOUNTER (OUTPATIENT)
Age: 52
Setting detail: SPECIMEN
Discharge: HOME | End: 2022-11-14

## 2022-11-14 ENCOUNTER — OFFICE VISIT (OUTPATIENT)
Dept: FAMILY MEDICINE CLINIC | Age: 52
End: 2022-11-14
Payer: MEDICARE

## 2022-11-14 VITALS
SYSTOLIC BLOOD PRESSURE: 152 MMHG | HEART RATE: 72 BPM | BODY MASS INDEX: 45.1 KG/M2 | WEIGHT: 315 LBS | DIASTOLIC BLOOD PRESSURE: 90 MMHG | HEIGHT: 70 IN

## 2022-11-14 DIAGNOSIS — N18.31 TYPE 2 DIABETES MELLITUS WITH STAGE 3A CHRONIC KIDNEY DISEASE, WITHOUT LONG-TERM CURRENT USE OF INSULIN (HCC): ICD-10-CM

## 2022-11-14 DIAGNOSIS — Z23 FLU VACCINE NEED: ICD-10-CM

## 2022-11-14 DIAGNOSIS — I10 UNCONTROLLED HYPERTENSION: ICD-10-CM

## 2022-11-14 DIAGNOSIS — E11.22 TYPE 2 DIABETES MELLITUS WITH STAGE 3A CHRONIC KIDNEY DISEASE, WITHOUT LONG-TERM CURRENT USE OF INSULIN (HCC): ICD-10-CM

## 2022-11-14 DIAGNOSIS — E11.9 TYPE 2 DIABETES MELLITUS WITHOUT COMPLICATION, WITHOUT LONG-TERM CURRENT USE OF INSULIN (HCC): Primary | ICD-10-CM

## 2022-11-14 DIAGNOSIS — E11.9 TYPE 2 DIABETES MELLITUS WITHOUT COMPLICATION, WITHOUT LONG-TERM CURRENT USE OF INSULIN (HCC): ICD-10-CM

## 2022-11-14 LAB
ANION GAP SERPL CALCULATED.3IONS-SCNC: 16 MMOL/L (ref 9–17)
BUN BLDV-MCNC: 25 MG/DL (ref 6–20)
CALCIUM SERPL-MCNC: 10 MG/DL (ref 8.6–10.4)
CHLORIDE BLD-SCNC: 102 MMOL/L (ref 98–107)
CO2: 24 MMOL/L (ref 20–31)
CREAT SERPL-MCNC: 1.26 MG/DL (ref 0.7–1.2)
GFR SERPL CREATININE-BSD FRML MDRD: >60 ML/MIN/1.73M2
GLUCOSE BLD-MCNC: 122 MG/DL (ref 70–99)
HBA1C MFR BLD: 6.4 %
POTASSIUM SERPL-SCNC: 4.8 MMOL/L (ref 3.7–5.3)
SODIUM BLD-SCNC: 142 MMOL/L (ref 135–144)

## 2022-11-14 PROCEDURE — 83036 HEMOGLOBIN GLYCOSYLATED A1C: CPT | Performed by: STUDENT IN AN ORGANIZED HEALTH CARE EDUCATION/TRAINING PROGRAM

## 2022-11-14 PROCEDURE — G8427 DOCREV CUR MEDS BY ELIG CLIN: HCPCS | Performed by: STUDENT IN AN ORGANIZED HEALTH CARE EDUCATION/TRAINING PROGRAM

## 2022-11-14 PROCEDURE — 3017F COLORECTAL CA SCREEN DOC REV: CPT | Performed by: STUDENT IN AN ORGANIZED HEALTH CARE EDUCATION/TRAINING PROGRAM

## 2022-11-14 PROCEDURE — 99213 OFFICE O/P EST LOW 20 MIN: CPT | Performed by: STUDENT IN AN ORGANIZED HEALTH CARE EDUCATION/TRAINING PROGRAM

## 2022-11-14 PROCEDURE — 1036F TOBACCO NON-USER: CPT | Performed by: STUDENT IN AN ORGANIZED HEALTH CARE EDUCATION/TRAINING PROGRAM

## 2022-11-14 PROCEDURE — 3044F HG A1C LEVEL LT 7.0%: CPT | Performed by: STUDENT IN AN ORGANIZED HEALTH CARE EDUCATION/TRAINING PROGRAM

## 2022-11-14 PROCEDURE — 3074F SYST BP LT 130 MM HG: CPT | Performed by: STUDENT IN AN ORGANIZED HEALTH CARE EDUCATION/TRAINING PROGRAM

## 2022-11-14 PROCEDURE — G8482 FLU IMMUNIZE ORDER/ADMIN: HCPCS | Performed by: STUDENT IN AN ORGANIZED HEALTH CARE EDUCATION/TRAINING PROGRAM

## 2022-11-14 PROCEDURE — G0008 ADMIN INFLUENZA VIRUS VAC: HCPCS | Performed by: STUDENT IN AN ORGANIZED HEALTH CARE EDUCATION/TRAINING PROGRAM

## 2022-11-14 PROCEDURE — 2022F DILAT RTA XM EVC RTNOPTHY: CPT | Performed by: STUDENT IN AN ORGANIZED HEALTH CARE EDUCATION/TRAINING PROGRAM

## 2022-11-14 PROCEDURE — 3078F DIAST BP <80 MM HG: CPT | Performed by: STUDENT IN AN ORGANIZED HEALTH CARE EDUCATION/TRAINING PROGRAM

## 2022-11-14 PROCEDURE — G8417 CALC BMI ABV UP PARAM F/U: HCPCS | Performed by: STUDENT IN AN ORGANIZED HEALTH CARE EDUCATION/TRAINING PROGRAM

## 2022-11-14 NOTE — PROGRESS NOTES
HYPERTENSION visit     BP Readings from Last 3 Encounters:   08/23/22 (!) 179/95   08/01/22 132/76   04/28/22 131/85       LDL Cholesterol (mg/dL)   Date Value   04/20/2022 127     HDL (mg/dL)   Date Value   04/20/2022 39 (L)     BUN (mg/dL)   Date Value   04/20/2022 20     Creatinine (mg/dL)   Date Value   04/20/2022 1.22 (H)     Glucose (mg/dL)   Date Value   04/20/2022 169 (H)   06/06/2017 222 (H)              Have you changed or started any medications since your last visit including any over-the-counter medicines, vitamins, or herbal medicines? no   Have you stopped taking any of your medications? Is so, why? -  no  Are you having any side effects from any of your medications? - no  How often do you miss doses of your medication? rare      Have you seen any other physician or provider since your last visit? yes - PT, Pain Management   Have you had any other diagnostic tests since your last visit?  no   Have you been seen in the emergency room and/or had an admission in a hospital since we last saw you?  no   Have you had your routine dental cleaning in the past 6 months?  no     Do you have an active MyChart account? If no, what is the barrier?   Yes    Patient Care Team:  Fadi Thornton MD as PCP - General (Emergency Medicine)  Yancy Lacy DO as PCP - [facility] Provider    Medical History Review  Past Medical, Family, and Social History reviewed and does contribute to the patient presenting condition    Health Maintenance   Topic Date Due    Diabetic retinal exam  Never done    Diabetic foot exam  02/19/2022    Annual Wellness Visit (AWV)  05/07/2022    Flu vaccine (1) Never done    Colorectal Cancer Screen  02/10/2023    Lipids  04/20/2023    A1C test (Diabetic or Prediabetic)  08/01/2023    Depression Screen  08/01/2023    DTaP/Tdap/Td vaccine (4 - Td or Tdap) 07/10/2032    Hepatitis B vaccine  Completed    Shingles vaccine  Completed    Pneumococcal 0-64 years Vaccine  Completed    COVID-19 Vaccine  Completed    Hepatitis C screen  Completed    HIV screen  Completed    Hepatitis A vaccine  Aged Out    Hib vaccine  Aged Out    Meningococcal (ACWY) vaccine  Aged Out

## 2022-11-14 NOTE — PATIENT INSTRUCTIONS
Thank you for letting us take care of you today. We hope all your questions were addressed. If a question was overlooked or something else comes to mind after you return home, please contact a member of your Care Team listed below. Your Care Team at [place] [address] is Team #5  Randa Duvall MD (Faculty)  Jacinto Srinivasan MD (Resident)  Maikol Mason MD (Resident)  Radha Mustafa MD (Resident)  Bharati Dempsey MD, (Resident)  Donell Rajan., DUSTIN Mcnally., MEDARDO Dodd.,  LPN  Aydee Hui., Amber Pollard., [facility] office)  Filipe Simeon, [address] (Clinical Practice Manager)  Monica Starr, [facility] (Clinical Pharmacist)       Office phone number: [phone]    If you need to get in right away due to illness, please be advised we have \"Same Day\" appointments available Monday-Friday. Please call us at [phone] option #3 to schedule your \"Same Day\" appointment.

## 2022-11-14 NOTE — PROGRESS NOTES
[facility]    Family Medicine Residency Program - Outpatient Note      Subjective:      Kristi Ray is a 45 y.o. male  presented to the office on 11/14/22 with complaints of: Essential hypertension    CC: Essential hypertension  Today's Blood Pressure: 152/90  BP monitor: None  Readings at home: NA   Symptoms of HA/CP/SOB/Blurry vision/Racing of heart/AP: NA  Current medication: Amlodipine 10 Mg, Coreg 25 Mg twice daily, hydralazine 50 Mg 4 times a day  Compliance: Yes  Smoking: None  Previous CVA/CAD/DM: CKD  Last Lipid Profile: See report  Last Hba1c: 6.4 (T)  BP at Goal: None     Underlying comorbidty ACC/AHA NICE   Established atherosclerotic cardiovascular disease* <130/80 <140/90   Heart failure <130/80 <140/90   Diabetes mellitus <130/80 <140/90   Chronic kidney disease <130/80 <140/90   High cardiovascular risk <130/80 <140/90   Older adults? <130/80 <140/90   No comorbidity <130/80 <140/90             Review of systems (Except what is mentioned in the HPI)  CONSTITUTIONAL: Negative  RESPIRATORY: Negative  CARDIOVASCULAR: Negative  GASTROINTESTINAL: Negative  GENITOURINARY: Negative   MUSCULOSKELETAL: Negative  NEUROLOGICAL: Negative  BEHAVIOR/PSYCH: Negative      Objective:      Vitals:    11/14/22 1400   BP: (!) 152/90   Pulse: 72       General Appearance - Alert and oriented x 3  HEENT - No obvious deformity  Lungs - Bilateral good air entry , no wheezes or rales  Cardiovascular - Regular rate and rhythm. No murmur  Abdomen - Soft and nontender  Neurologic - No new focal motor or sensory deficits  Skin - No bruising or bleeding on exposed skin area  MSK - No new joint/bone pains   Psych - normal affect       Assessment:        ICD-10-CM    1. Type 2 diabetes mellitus without complication, without long-term current use of insulin (HCC)  E11.9 POCT glycosylated hemoglobin (Hb A1C)     Basic Metabolic Panel      2.  Type 2 diabetes mellitus with stage 3a chronic kidney disease, without long-term current use of insulin (HCC)  E11.22     N18.31       3. Flu vaccine need  Z23 Influenza, AFLURIA, (age 1 y+), IM, Preservative Free, 0.5 mL      4. Uncontrolled hypertension  I10 Sleep Study with PAP Titration          Plan: Will recheck patient blood pressure, patient was prescribed combination pill of amlodipine and valsartan, patient insurance is not covering. After checking BMP will prescribe valsartan. Secondary hypertension work-up is mostly unremarkable, patient was counseled to do a sleep study for CPAP, that might be contributing towards uncontrolled blood pressure. Counseled the patient to bring the blood pressure log next time and we will adjust the blood pressure medication. Since patient is diabetic, will reassess kidney function and add ARB for better blood pressure control or possible Aldactone in the next encounter. Return in about 1 week (around 11/21/2022) for Lab Work, HTN. Requested Prescriptions      No prescriptions requested or ordered in this encounter       Medications Discontinued During This Encounter   Medication Reason    baclofen (LIORESAL) 10 MG tablet LIST CLEANUP    amLODIPine-valsartan (EXFORGE)  MG per tablet Therapy completed       Heike Nevarez received counseling on the following healthy behaviors: nutrition, exercise and medication adherence    Discussed use, benefit, and side effects of prescribed medications. Barriers to medication compliance addressed. All patient questions answered. Pt voiced understanding. Disclaimer: Some oral of this note was transcribed using voice-recognition software. This may cause typographical errors occasionally. Although all effort is made to fix these errors, please do not hesitate to contact our office if there Magan Mazariegos concern with the understanding of this note.     Luis Edwards  PGY-3  Family Medicine     11/14/2022  4:05 PM

## 2022-11-16 ENCOUNTER — TELEPHONE (OUTPATIENT)
Dept: PAIN MANAGEMENT | Age: 52
End: 2022-11-16

## 2022-11-17 ENCOUNTER — TELEPHONE (OUTPATIENT)
Dept: PAIN MANAGEMENT | Age: 52
End: 2022-11-17

## 2022-12-08 ENCOUNTER — OFFICE VISIT (OUTPATIENT)
Dept: FAMILY MEDICINE CLINIC | Age: 52
End: 2022-12-08
Payer: MEDICARE

## 2022-12-08 VITALS
WEIGHT: 313.2 LBS | BODY MASS INDEX: 44.84 KG/M2 | DIASTOLIC BLOOD PRESSURE: 82 MMHG | HEIGHT: 70 IN | HEART RATE: 67 BPM | SYSTOLIC BLOOD PRESSURE: 132 MMHG

## 2022-12-08 DIAGNOSIS — I10 ESSENTIAL HYPERTENSION: Primary | ICD-10-CM

## 2022-12-08 DIAGNOSIS — I15.9 SECONDARY HYPERTENSION: ICD-10-CM

## 2022-12-08 PROCEDURE — G8417 CALC BMI ABV UP PARAM F/U: HCPCS | Performed by: STUDENT IN AN ORGANIZED HEALTH CARE EDUCATION/TRAINING PROGRAM

## 2022-12-08 PROCEDURE — 3017F COLORECTAL CA SCREEN DOC REV: CPT | Performed by: STUDENT IN AN ORGANIZED HEALTH CARE EDUCATION/TRAINING PROGRAM

## 2022-12-08 PROCEDURE — 1036F TOBACCO NON-USER: CPT | Performed by: STUDENT IN AN ORGANIZED HEALTH CARE EDUCATION/TRAINING PROGRAM

## 2022-12-08 PROCEDURE — G8482 FLU IMMUNIZE ORDER/ADMIN: HCPCS | Performed by: STUDENT IN AN ORGANIZED HEALTH CARE EDUCATION/TRAINING PROGRAM

## 2022-12-08 PROCEDURE — 3074F SYST BP LT 130 MM HG: CPT | Performed by: STUDENT IN AN ORGANIZED HEALTH CARE EDUCATION/TRAINING PROGRAM

## 2022-12-08 PROCEDURE — 99213 OFFICE O/P EST LOW 20 MIN: CPT | Performed by: STUDENT IN AN ORGANIZED HEALTH CARE EDUCATION/TRAINING PROGRAM

## 2022-12-08 PROCEDURE — G8427 DOCREV CUR MEDS BY ELIG CLIN: HCPCS | Performed by: STUDENT IN AN ORGANIZED HEALTH CARE EDUCATION/TRAINING PROGRAM

## 2022-12-08 PROCEDURE — 3078F DIAST BP <80 MM HG: CPT | Performed by: STUDENT IN AN ORGANIZED HEALTH CARE EDUCATION/TRAINING PROGRAM

## 2022-12-08 RX ORDER — HYDRALAZINE HYDROCHLORIDE 50 MG/1
25 TABLET, FILM COATED ORAL 3 TIMES DAILY
Qty: 120 TABLET | Refills: 3 | Status: SHIPPED | OUTPATIENT
Start: 2022-12-08 | End: 2023-03-08

## 2022-12-08 RX ORDER — AMLODIPINE AND VALSARTAN 5; 160 MG/1; MG/1
1 TABLET ORAL DAILY
Qty: 30 TABLET | Refills: 3 | Status: SHIPPED | OUTPATIENT
Start: 2022-12-08

## 2022-12-08 ASSESSMENT — PATIENT HEALTH QUESTIONNAIRE - PHQ9
SUM OF ALL RESPONSES TO PHQ QUESTIONS 1-9: 0
SUM OF ALL RESPONSES TO PHQ QUESTIONS 1-9: 0
SUM OF ALL RESPONSES TO PHQ9 QUESTIONS 1 & 2: 0
2. FEELING DOWN, DEPRESSED OR HOPELESS: 0
1. LITTLE INTEREST OR PLEASURE IN DOING THINGS: 0
SUM OF ALL RESPONSES TO PHQ QUESTIONS 1-9: 0
SUM OF ALL RESPONSES TO PHQ QUESTIONS 1-9: 0

## 2022-12-08 NOTE — PROGRESS NOTES
Attending Physician Statement  I  have discussed the care of Lavella Boast including pertinent history and exam findings with the resident. I agree with the assessment, plan and orders as documented by the resident. /82 (Site: Right Upper Arm, Position: Sitting, Cuff Size: Large Adult)   Pulse 67   Ht 5' 10\" (1.778 m)   Wt (!) 313 lb 3.2 oz (142.1 kg)   BMI 44.94 kg/m²    BP Readings from Last 3 Encounters:   12/08/22 132/82   11/14/22 (!) 152/90   08/23/22 (!) 179/95     Wt Readings from Last 3 Encounters:   12/08/22 (!) 313 lb 3.2 oz (142.1 kg)   11/14/22 (!) 315 lb 3.2 oz (143 kg)   10/10/22 300 lb (136.1 kg)          Diagnosis Orders   1. Essential hypertension  Microalbumin, Ur    amLODIPine-valsartan (EXFORGE) 5-160 MG per tablet      2.  Secondary hypertension  hydrALAZINE (APRESOLINE) 50 MG tablet          Umesh Alanis DO 12/8/2022 11:22 AM

## 2022-12-08 NOTE — PATIENT INSTRUCTIONS
Thank you for letting us take care of you today. We hope all your questions were addressed. If a question was overlooked or something else comes to mind after you return home, please contact a member of your Care Team listed below. Your Care Team at [place] [address] is Team #5  Irineo Jean MD (Faculty)  Monse Walsh MD (Resident)  Mary Jolley MD (Resident)  Isai Braun MD (Resident)  Reyes Revel, MD, (Resident)  Danny Wang., CMA  Vanessa Horton., RMA  Zeke Ramirez.,  LPN  Allegra Nascimento., Ramakrishna Lal., [facility] office)  Renea Marlow, [facility] [facility] [address] (Clinical Practice Manager)  Eve Padilla [facility] (Clinical Pharmacist)       Office phone number: [phone]    If you need to get in right away due to illness, please be advised we have \"Same Day\" appointments available Monday-Friday. Please call us at [phone] option #3 to schedule your \"Same Day\" appointment.

## 2022-12-08 NOTE — PROGRESS NOTES
[facility] 44    Family Medicine Residency Program - Outpatient Note      Subjective:      Gail Rodriguez is a 47 y.o. male  presented to the office on 12/08/22 with complaints of: Essential HTN  Cc: Blood pressure  Today's Blood Pressure: 132/82  BP monitor: Yes  Readings at home: 120-130/80-90s   Symptoms of HA/CP/SOB/Blurry vision/Racing of heart/AP: None  Current medication: Amlodipine 10 Mg, Dralzine 50 Mg 4 times a day, Coreg 25 Mg twice daily  Compliance: Yes  Smoking: No  Previous CVA/CAD/DM: DM  Last Lipid Profile: Unremarkable  Last Hba1c: See report, controlled  BP at Goal:      Underlying comorbidty ACC/AHA NICE   Established atherosclerotic cardiovascular disease* <130/80 <140/90   Heart failure <130/80 <140/90   Diabetes mellitus <130/80 <140/90   Chronic kidney disease <130/80 <140/90   High cardiovascular risk <130/80 <140/90   Older adults? <130/80 <140/90   No comorbidity <130/80 <140/90             Review of systems (Except what is mentioned in the HPI)  CONSTITUTIONAL: Negative  RESPIRATORY: Negative  CARDIOVASCULAR: Negative  GASTROINTESTINAL: Negative  GENITOURINARY: Negative   MUSCULOSKELETAL: Negative  NEUROLOGICAL: Negative  BEHAVIOR/PSYCH: Negative      Objective:      Vitals:    12/08/22 0844   BP: 132/82   Pulse: 67       General Appearance - Alert and oriented x 3  HEENT - No obvious deformity  Lungs - Bilateral good air entry , no wheezes or rales  Cardiovascular - Regular rate and rhythm. No murmur  Abdomen - Soft and nontender  Neurologic - No new focal motor or sensory deficits  Skin - No bruising or bleeding on exposed skin area  MSK - No new joint/bone pains   Psych - normal affect       Assessment:        ICD-10-CM    1. Essential hypertension  I10 Microalbumin, Ur     amLODIPine-valsartan (EXFORGE) 5-160 MG per tablet      2.  Secondary hypertension  I15.9 hydrALAZINE (APRESOLINE) 50 MG tablet          Plan:    Patient is completely asymptomatic, blood pressure is well controlled on the current regimen. Since patient is diabetic we will start ARBS. We will start patient on forage and decrease hydralazine to 25 mg 3 times daily. Long-term plan is to wean down hydralazine if he can and escalate valsartan and amlodipine combination. Counseled the patient to  the medication from the pharmacy and call back before starting the medication to prevent any confusion. Patient understand and agrees with the plan  Counseled the patient to schedule appoint with ophthalmologist, no neuropathic symptoms as of this time. Will reorder microalbumin. Return in about 3 months (around 3/8/2023) for DM, HTN, Lab Work. Requested Prescriptions     Signed Prescriptions Disp Refills    amLODIPine-valsartan (EXFORGE) 5-160 MG per tablet 30 tablet 3     Sig: Take 1 tablet by mouth daily    hydrALAZINE (APRESOLINE) 50 MG tablet 120 tablet 3     Sig: Take 0.5 tablets by mouth 3 times daily       Medications Discontinued During This Encounter   Medication Reason    Cholecalciferol 50 MCG (2000 UT) CAPS Therapy completed    hydrALAZINE (APRESOLINE) 50 MG tablet        Don Purchase received counseling on the following healthy behaviors: nutrition, exercise and medication adherence    Discussed use, benefit, and side effects of prescribed medications. Barriers to medication compliance addressed. All patient questions answered. Pt voiced understanding. Disclaimer: Some oral of this note was transcribed using voice-recognition software. This may cause typographical errors occasionally. Although all effort is made to fix these errors, please do not hesitate to contact our office if there Racquel Hay concern with the understanding of this note.     Luis Rodriguez  PGY-3  Family Medicine     12/8/2022  9:19 AM

## 2022-12-08 NOTE — PROGRESS NOTES
Visit Information    Have you changed or started any medications since your last visit including any over-the-counter medicines, vitamins, or herbal medicines? no   Are you having any side effects from any of your medications? -  no  Have you stopped taking any of your medications? Is so, why? -  no    Have you seen any other physician or provider since your last visit? No  Have you had any other diagnostic tests since your last visit? No  Have you been seen in the emergency room and/or had an admission to a hospital since we last saw you? No  Have you had your routine dental cleaning in the past 6 months? no    Have you activated your [employer] account? If not, what are your barriers?  Yes     Patient Care Team:  Annabel Wilde MD as PCP - General (Emergency Medicine)  Koki Schulz DO as PCP - [facility]    Medical History Review  Past Medical, Family, and Social History reviewed and does not contribute to the patient presenting condition    Health Maintenance   Topic Date Due    Diabetic retinal exam  Never done    Diabetic foot exam  02/19/2022    Annual Wellness Visit (AWV)  05/07/2022    Diabetic microalbuminuria test  08/12/2022    Colorectal Cancer Screen  02/10/2023    Lipids  04/20/2023    Depression Screen  08/01/2023    A1C test (Diabetic or Prediabetic)  11/14/2023    DTaP/Tdap/Td vaccine (4 - Td or Tdap) 07/10/2032    Hepatitis B vaccine  Completed    Flu vaccine  Completed    Shingles vaccine  Completed    Pneumococcal 0-64 years Vaccine  Completed    COVID-19 Vaccine  Completed    Hepatitis C screen  Completed    HIV screen  Completed    Hepatitis A vaccine  Aged Out    Hib vaccine  Aged Out    Meningococcal (ACWY) vaccine  Aged Out

## 2022-12-20 ENCOUNTER — TELEPHONE (OUTPATIENT)
Dept: FAMILY MEDICINE CLINIC | Age: 52
End: 2022-12-20

## 2022-12-20 DIAGNOSIS — I10 ESSENTIAL HYPERTENSION: ICD-10-CM

## 2022-12-20 RX ORDER — ATORVASTATIN CALCIUM 40 MG/1
TABLET, FILM COATED ORAL
Qty: 30 TABLET | Refills: 3 | Status: SHIPPED | OUTPATIENT
Start: 2022-12-20

## 2022-12-20 NOTE — TELEPHONE ENCOUNTER
Patient called office to inform his PCP that he received his Amlodipine - Valsartan. During call, patient asked if he was supposed to continue with the other Amlodipine he was taking, and writer spoke with Dr. Dawson Do, and he confirmed patient is to discontinue that amlodipine and start the Amlodipine - Valsartan. Patient acknowledged understanding.

## 2022-12-30 DIAGNOSIS — E11.9 TYPE 2 DIABETES MELLITUS WITHOUT COMPLICATION, WITHOUT LONG-TERM CURRENT USE OF INSULIN (HCC): ICD-10-CM

## 2022-12-30 DIAGNOSIS — M1A.9XX1 CHRONIC GOUT INVOLVING TOE OF LEFT FOOT WITH TOPHUS, UNSPECIFIED CAUSE: ICD-10-CM

## 2022-12-30 RX ORDER — ALLOPURINOL 100 MG/1
TABLET ORAL
Qty: 90 TABLET | Refills: 1 | Status: SHIPPED | OUTPATIENT
Start: 2022-12-30

## 2022-12-30 RX ORDER — LANCETS 30 GAUGE
1 EACH MISCELLANEOUS 3 TIMES DAILY
Qty: 600 EACH | Refills: 1 | Status: SHIPPED | OUTPATIENT
Start: 2022-12-30

## 2022-12-30 NOTE — TELEPHONE ENCOUNTER
Last visit:   Last Med refill:   Does patient have enough medication for 72 hours: No:     Next Visit Date:  No future appointments. Health Maintenance   Topic Date Due    Diabetic Alb to Cr ratio (uACR) test  Never done    Diabetic retinal exam  Never done    Diabetic foot exam  02/19/2022    Annual Wellness Visit (AWV)  05/07/2022    Colorectal Cancer Screen  02/10/2023    Lipids  04/20/2023    A1C test (Diabetic or Prediabetic)  11/14/2023    GFR test (Diabetes, CKD 3-4, OR last GFR 15-59)  11/14/2023    Depression Screen  12/08/2023    DTaP/Tdap/Td vaccine (4 - Td or Tdap) 07/10/2032    Hepatitis B vaccine  Completed    Flu vaccine  Completed    Shingles vaccine  Completed    Pneumococcal 0-64 years Vaccine  Completed    COVID-19 Vaccine  Completed    Hepatitis C screen  Completed    HIV screen  Completed    Hepatitis A vaccine  Aged Out    Hib vaccine  Aged Out    Meningococcal (ACWY) vaccine  Aged Out       Hemoglobin A1C (%)   Date Value   11/14/2022 6.4   08/01/2022 7.1   01/06/2022 6.1             ( goal A1C is < 7)   Microalb/Crt.  Ratio (mcg/mg creat)   Date Value   08/12/2021 898 (H)     LDL Cholesterol (mg/dL)   Date Value   04/20/2022 127   09/22/2020 67       (goal LDL is <100)   AST (U/L)   Date Value   04/20/2022 15     ALT (U/L)   Date Value   04/20/2022 13     BUN (mg/dL)   Date Value   11/14/2022 25 (H)     BP Readings from Last 3 Encounters:   12/08/22 132/82   11/14/22 (!) 152/90   08/23/22 (!) 179/95          (goal 120/80)    All Future Testing planned in [facility]  Lab Frequency Next Occurrence   MRI LUMBAR SPINE WO CONTRAST Once 10/10/2022   Sleep Study with PAP Titration Once 11/14/2022   Microalbumin, Ur Once 12/08/2022               Patient Active Problem List:     Essential hypertension     Chronic back pain     Morbid obesity with BMI of 50.0-59.9, adult (HCC)     ZURDO (obstructive sleep apnea)     Gout     CKD (chronic kidney disease)     Mixed hyperlipidemia     Squamous cell carcinoma of face     Type 2 diabetes mellitus without complication, without long-term current use of insulin (HCC)     Need for prophylactic vaccination and inoculation against varicella     Vitamin D deficiency     BMI 50.0-59.9, adult ([facility] [address]     Type 2 diabetes mellitus with hyperglycemia     Diabetes mellitus ([facility] [address]     Back spasm     Secondary hypertension     Type 2 diabetes mellitus with chronic kidney disease           Please address the medication refill and close the encounter. If I can be of assistance, please route to the applicable pool. Thank you.

## 2023-01-01 DIAGNOSIS — M1A.9XX1 CHRONIC GOUT INVOLVING TOE OF LEFT FOOT WITH TOPHUS, UNSPECIFIED CAUSE: ICD-10-CM

## 2023-01-03 RX ORDER — ALLOPURINOL 100 MG/1
TABLET ORAL
Qty: 90 TABLET | Refills: 1 | OUTPATIENT
Start: 2023-01-03

## 2023-01-03 NOTE — TELEPHONE ENCOUNTER
Last visit: 12/8/22  Last Med refill: 12/30/22  Does patient have enough medication for 72 hours: Yes    Next Visit Date:  No future appointments. Health Maintenance   Topic Date Due    Diabetic Alb to Cr ratio (uACR) test  Never done    Diabetic retinal exam  Never done    Diabetic foot exam  02/19/2022    Annual Wellness Visit (AWV)  05/07/2022    Colorectal Cancer Screen  02/10/2023    Lipids  04/20/2023    A1C test (Diabetic or Prediabetic)  11/14/2023    GFR test (Diabetes, CKD 3-4, OR last GFR 15-59)  11/14/2023    Depression Screen  12/08/2023    DTaP/Tdap/Td vaccine (4 - Td or Tdap) 07/10/2032    Hepatitis B vaccine  Completed    Flu vaccine  Completed    Shingles vaccine  Completed    Pneumococcal 0-64 years Vaccine  Completed    COVID-19 Vaccine  Completed    Hepatitis C screen  Completed    HIV screen  Completed    Hepatitis A vaccine  Aged Out    Hib vaccine  Aged Out    Meningococcal (ACWY) vaccine  Aged Out       Hemoglobin A1C (%)   Date Value   11/14/2022 6.4   08/01/2022 7.1   01/06/2022 6.1             ( goal A1C is < 7)   Microalb/Crt.  Ratio (mcg/mg creat)   Date Value   08/12/2021 898 (H)     LDL Cholesterol (mg/dL)   Date Value   04/20/2022 127   09/22/2020 67       (goal LDL is <100)   AST (U/L)   Date Value   04/20/2022 15     ALT (U/L)   Date Value   04/20/2022 13     BUN (mg/dL)   Date Value   11/14/2022 25 (H)     BP Readings from Last 3 Encounters:   12/08/22 132/82   11/14/22 (!) 152/90   08/23/22 (!) 179/95          (goal 120/80)    All Future Testing planned in [facility]  Lab Frequency Next Occurrence   MRI LUMBAR SPINE WO CONTRAST Once 10/10/2022   Sleep Study with PAP Titration Once 11/14/2022   Microalbumin, Ur Once 12/08/2022               Patient Active Problem List:     Essential hypertension     Chronic back pain     Morbid obesity with BMI of 50.0-59.9, adult (HCC)     ZURDO (obstructive sleep apnea)     Gout     CKD (chronic kidney disease)     Mixed hyperlipidemia     Squamous cell carcinoma of face     Type 2 diabetes mellitus without complication, without long-term current use of insulin ([address]     Need for prophylactic vaccination and inoculation against varicella     Vitamin D deficiency     BMI 50.0-59.9, adult ([facility] [address]     Type 2 diabetes mellitus with hyperglycemia     Diabetes mellitus ([address]     Back spasm     Secondary hypertension     Type 2 diabetes mellitus with chronic kidney disease

## 2023-01-06 DIAGNOSIS — M1A.9XX1 CHRONIC GOUT INVOLVING TOE OF LEFT FOOT WITH TOPHUS, UNSPECIFIED CAUSE: ICD-10-CM

## 2023-01-06 RX ORDER — ALLOPURINOL 100 MG/1
TABLET ORAL
Qty: 90 TABLET | Refills: 1 | OUTPATIENT
Start: 2023-01-06

## 2023-01-15 DIAGNOSIS — I10 ESSENTIAL HYPERTENSION: ICD-10-CM

## 2023-01-16 RX ORDER — CARVEDILOL 25 MG/1
TABLET ORAL
Qty: 60 TABLET | Refills: 2 | Status: SHIPPED | OUTPATIENT
Start: 2023-01-16

## 2023-01-16 NOTE — TELEPHONE ENCOUNTER
E-scribe request for med refills. Please review and e-scribe if applicable. Last Visit Date:  12/8/22  Next Visit Date:  Visit date not found    Hemoglobin A1C (%)   Date Value   11/14/2022 6.4   08/01/2022 7.1   01/06/2022 6.1             ( goal A1C is < 7)   Microalb/Crt.  Ratio (mcg/mg creat)   Date Value   08/12/2021 898 (H)     LDL Cholesterol (mg/dL)   Date Value   04/20/2022 127       (goal LDL is <100)   AST (U/L)   Date Value   04/20/2022 15     ALT (U/L)   Date Value   04/20/2022 13     BUN (mg/dL)   Date Value   11/14/2022 25 (H)     BP Readings from Last 3 Encounters:   12/08/22 132/82   11/14/22 (!) 152/90   08/23/22 (!) 179/95          (goal 120/80)        Patient Active Problem List:     Essential hypertension     Chronic back pain     Morbid obesity with BMI of 50.0-59.9, adult ([facility])     ZURDO (obstructive sleep apnea)     Gout     CKD (chronic kidney disease)     Mixed hyperlipidemia     Squamous cell carcinoma of face     Type 2 diabetes mellitus without complication, without long-term current use of insulin ([facility] [address]     Need for prophylactic vaccination and inoculation against varicella     Vitamin D deficiency     BMI 50.0-59.9, adult ([facility] [address]     Type 2 diabetes mellitus with hyperglycemia     Diabetes mellitus ([facility] [address]     Back spasm     Secondary hypertension     Type 2 diabetes mellitus with chronic kidney disease      ----Astrid Arreola

## 2023-01-19 DIAGNOSIS — I15.9 SECONDARY HYPERTENSION: ICD-10-CM

## 2023-01-19 DIAGNOSIS — E11.9 TYPE 2 DIABETES MELLITUS WITHOUT COMPLICATION, WITHOUT LONG-TERM CURRENT USE OF INSULIN (HCC): ICD-10-CM

## 2023-01-19 DIAGNOSIS — G89.29 CHRONIC MIDLINE LOW BACK PAIN WITH RIGHT-SIDED SCIATICA: ICD-10-CM

## 2023-01-19 DIAGNOSIS — I10 ESSENTIAL HYPERTENSION: ICD-10-CM

## 2023-01-19 DIAGNOSIS — M54.41 CHRONIC MIDLINE LOW BACK PAIN WITH RIGHT-SIDED SCIATICA: ICD-10-CM

## 2023-01-19 DIAGNOSIS — M1A.9XX1 CHRONIC GOUT INVOLVING TOE OF LEFT FOOT WITH TOPHUS, UNSPECIFIED CAUSE: ICD-10-CM

## 2023-01-19 RX ORDER — AMLODIPINE AND VALSARTAN 5; 160 MG/1; MG/1
1 TABLET ORAL DAILY
Qty: 30 TABLET | Refills: 3 | Status: CANCELLED | OUTPATIENT
Start: 2023-01-19

## 2023-01-19 RX ORDER — ASPIRIN 81 MG/1
81 TABLET ORAL DAILY
Qty: 90 TABLET | Refills: 5 | Status: CANCELLED | OUTPATIENT
Start: 2023-01-19

## 2023-01-19 RX ORDER — ALLOPURINOL 100 MG/1
TABLET ORAL
Qty: 90 TABLET | Refills: 1 | Status: CANCELLED | OUTPATIENT
Start: 2023-01-19

## 2023-01-19 RX ORDER — ATORVASTATIN CALCIUM 40 MG/1
TABLET, FILM COATED ORAL
Qty: 30 TABLET | Refills: 3 | Status: CANCELLED | OUTPATIENT
Start: 2023-01-19

## 2023-01-19 RX ORDER — HYDRALAZINE HYDROCHLORIDE 50 MG/1
25 TABLET, FILM COATED ORAL 3 TIMES DAILY
Qty: 120 TABLET | Refills: 3 | Status: CANCELLED | OUTPATIENT
Start: 2023-01-19 | End: 2023-04-19

## 2023-01-19 NOTE — TELEPHONE ENCOUNTER
Last visit:   Last Med refill:   Does patient have enough medication for 72 hours: No:     Next Visit Date:  No future appointments. Health Maintenance   Topic Date Due    Diabetic retinal exam  Never done    Diabetic foot exam  02/19/2022    Annual Wellness Visit (AWV)  05/07/2022    Diabetic Alb to Cr ratio (uACR) test  08/12/2022    Colorectal Cancer Screen  02/10/2023    Lipids  04/20/2023    A1C test (Diabetic or Prediabetic)  11/14/2023    GFR test (Diabetes, CKD 3-4, OR last GFR 15-59)  11/14/2023    Depression Screen  12/08/2023    DTaP/Tdap/Td vaccine (4 - Td or Tdap) 07/10/2032    Hepatitis B vaccine  Completed    Flu vaccine  Completed    Shingles vaccine  Completed    Pneumococcal 0-64 years Vaccine  Completed    COVID-19 Vaccine  Completed    Hepatitis C screen  Completed    HIV screen  Completed    Hepatitis A vaccine  Aged Out    Hib vaccine  Aged Out    Meningococcal (ACWY) vaccine  Aged Out       Hemoglobin A1C (%)   Date Value   11/14/2022 6.4   08/01/2022 7.1   01/06/2022 6.1             ( goal A1C is < 7)   Microalb/Crt.  Ratio (mcg/mg creat)   Date Value   08/12/2021 898 (H)     LDL Cholesterol (mg/dL)   Date Value   04/20/2022 127   09/22/2020 67       (goal LDL is <100)   AST (U/L)   Date Value   04/20/2022 15     ALT (U/L)   Date Value   04/20/2022 13     BUN (mg/dL)   Date Value   11/14/2022 25 (H)     BP Readings from Last 3 Encounters:   12/08/22 132/82   11/14/22 (!) 152/90   08/23/22 (!) 179/95          (goal 120/80)    All Future Testing planned in [facility]  Lab Frequency Next Occurrence   MRI LUMBAR SPINE WO CONTRAST Once 10/10/2022   Sleep Study with PAP Titration Once 11/14/2022   Microalbumin, Ur Once 12/08/2022               Patient Active Problem List:     Essential hypertension     Chronic back pain     Morbid obesity with BMI of 50.0-59.9, adult (HCC)     ZURDO (obstructive sleep apnea)     Gout     CKD (chronic kidney disease)     Mixed hyperlipidemia     Squamous cell carcinoma of face     Type 2 diabetes mellitus without complication, without long-term current use of insulin (HCC)     Need for prophylactic vaccination and inoculation against varicella     Vitamin D deficiency     BMI 50.0-59.9, adult ([facility] [address]     Type 2 diabetes mellitus with hyperglycemia     Diabetes mellitus ([facility] [address]     Back spasm     Secondary hypertension     Type 2 diabetes mellitus with chronic kidney disease           Please address the medication refill and close the encounter. If I can be of assistance, please route to the applicable pool. Thank you.

## 2023-01-20 DIAGNOSIS — E11.9 TYPE 2 DIABETES MELLITUS WITHOUT COMPLICATION, WITHOUT LONG-TERM CURRENT USE OF INSULIN (HCC): ICD-10-CM

## 2023-02-01 ENCOUNTER — HOSPITAL ENCOUNTER (OUTPATIENT)
Age: 53
Setting detail: SPECIMEN
Discharge: HOME | End: 2023-02-01

## 2023-02-01 DIAGNOSIS — I10 ESSENTIAL HYPERTENSION: ICD-10-CM

## 2023-02-01 LAB
CREATININE URINE: 36 MG/DL (ref 39–259)
MICROALBUMIN/CREAT 24H UR: 674 MG/L
MICROALBUMIN/CREAT UR-RTO: 1872 MCG/MG CREAT

## 2023-03-03 DIAGNOSIS — E11.9 TYPE 2 DIABETES MELLITUS WITHOUT COMPLICATION, WITHOUT LONG-TERM CURRENT USE OF INSULIN (HCC): ICD-10-CM

## 2023-03-15 DIAGNOSIS — I10 ESSENTIAL HYPERTENSION: ICD-10-CM

## 2023-03-15 RX ORDER — AMLODIPINE AND VALSARTAN 5; 160 MG/1; MG/1
1 TABLET ORAL DAILY
Qty: 90 TABLET | Refills: 3 | Status: SHIPPED | OUTPATIENT
Start: 2023-03-15

## 2023-03-15 NOTE — TELEPHONE ENCOUNTER
E-scribe request for med refills. Please review and e-scribe if applicable.     Last Visit Date:  12/8/22  Next Visit Date:  Visit date not found    Hemoglobin A1C (%)   Date Value   11/14/2022 6.4   08/01/2022 7.1   01/06/2022 6.1             ( goal A1C is < 7)   Microalb/Crt. Ratio (mcg/mg creat)   Date Value   02/01/2023 1,872 (H)     LDL Cholesterol (mg/dL)   Date Value   04/20/2022 127       (goal LDL is <100)   AST (U/L)   Date Value   04/20/2022 15     ALT (U/L)   Date Value   04/20/2022 13     BUN (mg/dL)   Date Value   11/14/2022 25 (H)     BP Readings from Last 3 Encounters:   12/08/22 132/82   11/14/22 (!) 152/90   08/23/22 (!) 179/95          (goal 120/80)        Patient Active Problem List:     Essential hypertension     Chronic back pain     Morbid obesity with BMI of 50.0-59.9, adult (HCC)     ZURDO (obstructive sleep apnea)     Gout     CKD (chronic kidney disease)     Mixed hyperlipidemia     Squamous cell carcinoma of face     Type 2 diabetes mellitus without complication, without long-term current use of insulin (HCC)     Need for prophylactic vaccination and inoculation against varicella     Vitamin D deficiency     BMI 50.0-59.9, adult (HCC)     Type 2 diabetes mellitus with hyperglycemia     Diabetes mellitus (HCC)     Back spasm     Secondary hypertension     Type 2 diabetes mellitus with chronic kidney disease      ----GLENROY

## 2023-04-09 DIAGNOSIS — I10 ESSENTIAL HYPERTENSION: ICD-10-CM

## 2023-04-10 RX ORDER — CARVEDILOL 25 MG/1
TABLET ORAL
Qty: 60 TABLET | Refills: 2 | OUTPATIENT
Start: 2023-04-10

## 2023-04-14 ENCOUNTER — TELEPHONE (OUTPATIENT)
Dept: FAMILY MEDICINE CLINIC | Age: 53
End: 2023-04-14

## 2023-04-29 DIAGNOSIS — G89.29 CHRONIC BILATERAL LOW BACK PAIN WITHOUT SCIATICA: ICD-10-CM

## 2023-04-29 DIAGNOSIS — M54.50 CHRONIC BILATERAL LOW BACK PAIN WITHOUT SCIATICA: ICD-10-CM

## 2023-05-01 RX ORDER — ACETAMINOPHEN 500 MG/1
TABLET ORAL
Qty: 180 TABLET | Refills: 1 | Status: SHIPPED | OUTPATIENT
Start: 2023-05-01

## 2023-05-01 NOTE — TELEPHONE ENCOUNTER
Last visit: 04/13/2023  Last Med refill:   Does patient have enough medication for 72 hours: No:     Next Visit Date:  Future Appointments   Date Time Provider Lizette Marques   6/2/2023  9:30 AM MD Isabela Isbell [id number]   6/30/2023  3:30 PM Malcom Lai MD [address] [place] [address] Maintenance   Topic Date Due    Diabetic retinal exam  Never done    Diabetic foot exam  02/19/2022    Annual Wellness Visit (AWV)  05/07/2022    Lipids  04/20/2023    GFR test (Diabetes, CKD 3-4, OR last GFR 15-59)  11/14/2023    Depression Screen  12/08/2023    Diabetic Alb to Cr ratio (uACR) test  02/01/2024    A1C test (Diabetic or Prediabetic)  04/13/2024    Colorectal Cancer Screen  04/19/2026    DTaP/Tdap/Td vaccine (4 - Td or Tdap) 07/10/2032    Hepatitis B vaccine  Completed    Flu vaccine  Completed    Shingles vaccine  Completed    Pneumococcal 0-64 years Vaccine  Completed    COVID-19 Vaccine  Completed    Hepatitis C screen  Completed    HIV screen  Completed    Hepatitis A vaccine  Aged Out    Hib vaccine  Aged Out    Meningococcal (ACWY) vaccine  Aged Out       Hemoglobin A1C (%)   Date Value   04/13/2023 6.6   11/14/2022 6.4   08/01/2022 7.1             ( goal A1C is < 7)   Microalb/Crt.  Ratio (mcg/mg creat)   Date Value   02/01/2023 1,872 (H)     LDL Cholesterol (mg/dL)   Date Value   04/20/2022 127   09/22/2020 67       (goal LDL is <100)   AST (U/L)   Date Value   04/20/2022 15     ALT (U/L)   Date Value   04/20/2022 13     BUN (mg/dL)   Date Value   11/14/2022 25 (H)     BP Readings from Last 3 Encounters:   04/13/23 (!) 160/80   12/08/22 132/82   11/14/22 (!) 152/90          (goal 120/80)    All Future Testing planned in [facility]  Lab Frequency Next Occurrence   MRI LUMBAR SPINE WO CONTRAST Once 10/10/2022   Sleep Study with PAP Titration Once 11/14/2022               Patient Active Problem List:     Essential hypertension     Chronic back pain     Morbid obesity with BMI of 50.0-59.9,

## 2023-06-02 ENCOUNTER — OFFICE VISIT (OUTPATIENT)
Dept: FAMILY MEDICINE CLINIC | Age: 53
End: 2023-06-02
Payer: MEDICARE

## 2023-06-02 VITALS
HEART RATE: 74 BPM | BODY MASS INDEX: 45.1 KG/M2 | WEIGHT: 315 LBS | OXYGEN SATURATION: 97 % | SYSTOLIC BLOOD PRESSURE: 126 MMHG | HEIGHT: 70 IN | DIASTOLIC BLOOD PRESSURE: 78 MMHG

## 2023-06-02 DIAGNOSIS — N18.31 TYPE 2 DIABETES MELLITUS WITH STAGE 3A CHRONIC KIDNEY DISEASE, WITHOUT LONG-TERM CURRENT USE OF INSULIN (HCC): ICD-10-CM

## 2023-06-02 DIAGNOSIS — I10 ESSENTIAL HYPERTENSION: Primary | ICD-10-CM

## 2023-06-02 DIAGNOSIS — E11.22 TYPE 2 DIABETES MELLITUS WITH STAGE 3A CHRONIC KIDNEY DISEASE, WITHOUT LONG-TERM CURRENT USE OF INSULIN (HCC): ICD-10-CM

## 2023-06-02 PROCEDURE — G8417 CALC BMI ABV UP PARAM F/U: HCPCS

## 2023-06-02 PROCEDURE — 3017F COLORECTAL CA SCREEN DOC REV: CPT

## 2023-06-02 PROCEDURE — 2022F DILAT RTA XM EVC RTNOPTHY: CPT

## 2023-06-02 PROCEDURE — 99213 OFFICE O/P EST LOW 20 MIN: CPT

## 2023-06-02 PROCEDURE — G8427 DOCREV CUR MEDS BY ELIG CLIN: HCPCS

## 2023-06-02 PROCEDURE — 3044F HG A1C LEVEL LT 7.0%: CPT

## 2023-06-02 PROCEDURE — 1036F TOBACCO NON-USER: CPT

## 2023-06-02 PROCEDURE — 3074F SYST BP LT 130 MM HG: CPT

## 2023-06-02 PROCEDURE — 3078F DIAST BP <80 MM HG: CPT

## 2023-06-02 ASSESSMENT — ENCOUNTER SYMPTOMS
VOMITING: 0
BACK PAIN: 0
ABDOMINAL PAIN: 0
COUGH: 0
DIARRHEA: 0
WHEEZING: 0
CONSTIPATION: 0
NAUSEA: 0
SHORTNESS OF BREATH: 0

## 2023-06-02 ASSESSMENT — PATIENT HEALTH QUESTIONNAIRE - PHQ9
SUM OF ALL RESPONSES TO PHQ QUESTIONS 1-9: 0
SUM OF ALL RESPONSES TO PHQ QUESTIONS 1-9: 0
SUM OF ALL RESPONSES TO PHQ9 QUESTIONS 1 & 2: 0
2. FEELING DOWN, DEPRESSED OR HOPELESS: 0
SUM OF ALL RESPONSES TO PHQ QUESTIONS 1-9: 0
1. LITTLE INTEREST OR PLEASURE IN DOING THINGS: 0
SUM OF ALL RESPONSES TO PHQ QUESTIONS 1-9: 0

## 2023-06-02 NOTE — PROGRESS NOTES
DIABETES and HYPERTENSION visit    BP Readings from Last 3 Encounters:   04/13/23 (!) 160/80   12/08/22 132/82   11/14/22 (!) 152/90        Hemoglobin A1C (%)   Date Value   04/13/2023 6.6   11/14/2022 6.4   08/01/2022 7.1     Microalb/Crt. Ratio (mcg/mg creat)   Date Value   02/01/2023 1,872 (H)     LDL Cholesterol (mg/dL)   Date Value   04/20/2022 127     HDL (mg/dL)   Date Value   04/20/2022 39 (L)     BUN (mg/dL)   Date Value   11/14/2022 25 (H)     Creatinine (mg/dL)   Date Value   11/14/2022 1.26 (H)     Glucose (mg/dL)   Date Value   11/14/2022 122 (H)   06/06/2017 222 (H)            Have you changed or started any medications since your last visit including any over-the-counter medicines, vitamins, or herbal medicines? no   Have you stopped taking any of your medications? Is so, why? -  no  Are you having any side effects from any of your medications? - no    Have you seen any other physician or provider since your last visit?  no   Have you had any other diagnostic tests since your last visit?  no   Have you been seen in the emergency room and/or had an admission in a hospital since we last saw you?  no   Have you had your routine dental cleaning in the past 6 months?  no     Have you had your annual diabetic retinal (eye) exam? No   (ensure copy of exam is in the chart)    Do you have an active MyChart account? If no, what is the barrier?   Yes    Patient Care Team:  Og Hawley MD as PCP - General (Family Medicine)  Joel Payan DO as PCP - Empaneled Provider    Medical History Review  Past Medical, Family, and Social History reviewed and does not contribute to the patient presenting condition    Health Maintenance   Topic Date Due    Diabetic retinal exam  Never done    Diabetic foot exam  02/19/2022    Annual Wellness Visit (AWV)  05/07/2022    Lipids  04/20/2023    GFR test (Diabetes, CKD 3-4, OR last GFR 15-59)  11/14/2023    Depression Screen  12/08/2023    Diabetic Alb to Cr ratio

## 2023-06-02 NOTE — PROGRESS NOTES
Brent Arshad (:  1970) is a 47 y.o. male,Established patient, here for evaluation of the following chief complaint(s):  Hypertension (Follow up )         ASSESSMENT/PLAN:  1. Essential hypertension  -    Well-controlled   -Continue Exforge, carvedilol  Lipid Panel; Future    2. Type 2 diabetes mellitus with stage 3a chronic kidney disease,   without long-term current use of insulin ([facility])  -     Lipid Panel; Future      Return in about 14 weeks (around 2023). Subjective   SUBJECTIVE/OBJECTIVE:  47years old male patient with past ministry of hypertension, diabetes, ZURDO, morbid obesity came to the office for hypertension follow-up. Patient blood pressure medications were adjusted during the last visit for uncontrolled high blood pressure. Today, patient blood pressure is within normal limits. Patient also reported normal readings at home. Patient is asymptomatic. Denies any chest pain, shortness breath, dizziness, lightheadedness or blurry visions. Patient feels great and is happy with the current management. Review of Systems   Constitutional:  Negative for fatigue and fever. Respiratory:  Negative for cough, shortness of breath and wheezing. Cardiovascular:  Negative for chest pain, palpitations and leg swelling. Gastrointestinal:  Negative for abdominal pain, constipation, diarrhea, nausea and vomiting. Musculoskeletal:  Negative for arthralgias and back pain. Neurological:  Negative for dizziness, weakness, light-headedness, numbness and headaches. Objective   Physical Exam  Constitutional:       General: He is not in acute distress. Appearance: Normal appearance. HENT:      Head: Normocephalic and atraumatic. Cardiovascular:      Rate and Rhythm: Normal rate and regular rhythm. Pulses: Normal pulses. Heart sounds: Normal heart sounds. No murmur heard.   Pulmonary:      Effort: Pulmonary effort is normal.      Breath sounds: Normal breath

## 2023-06-02 NOTE — PROGRESS NOTES
Attending Physician Statement  I have discussed the care of Jackie Louis, including pertinent history and exam findings,  with the resident. I have reviewed the key elements of all parts of the encounter with the resident. I agree with the assessment, plan and orders as documented by the resident.   (Magdalena Caballero)    Maggie Archer MD

## 2023-07-08 DIAGNOSIS — I10 ESSENTIAL HYPERTENSION: ICD-10-CM

## 2023-07-08 DIAGNOSIS — E11.9 TYPE 2 DIABETES MELLITUS WITHOUT COMPLICATION, WITHOUT LONG-TERM CURRENT USE OF INSULIN (HCC): ICD-10-CM

## 2023-07-08 DIAGNOSIS — M1A.9XX1 CHRONIC GOUT INVOLVING TOE OF LEFT FOOT WITH TOPHUS, UNSPECIFIED CAUSE: ICD-10-CM

## 2023-07-10 RX ORDER — ALLOPURINOL 100 MG/1
TABLET ORAL
Qty: 90 TABLET | Refills: 1 | Status: SHIPPED | OUTPATIENT
Start: 2023-07-10

## 2023-07-10 RX ORDER — CARVEDILOL 25 MG/1
TABLET ORAL
Qty: 60 TABLET | Refills: 1 | Status: SHIPPED | OUTPATIENT
Start: 2023-07-10

## 2023-07-10 NOTE — TELEPHONE ENCOUNTER
E-scribe request for COREG 25 MG. Please review and e-scribe if applicable. Last Visit Date:  6/2/2023  Next Visit Date:  8/1/2023    Hemoglobin A1C (%)   Date Value   04/13/2023 6.6   11/14/2022 6.4   08/01/2022 7.1             ( goal A1C is < 7)   Microalb/Crt.  Ratio (mcg/mg creat)   Date Value   02/01/2023 1,872 (H)     LDL Cholesterol (mg/dL)   Date Value   04/20/2022 127       (goal LDL is <100)   AST (U/L)   Date Value   04/20/2022 15     ALT (U/L)   Date Value   04/20/2022 13     BUN (mg/dL)   Date Value   11/14/2022 25 (H)     BP Readings from Last 3 Encounters:   06/02/23 126/78   04/13/23 (!) 160/80   12/08/22 132/82          (goal 120/80)        Patient Active Problem List:     Essential hypertension     Chronic back pain     Morbid obesity with BMI of 50.0-59.9, adult ([facility])     ZURDO (obstructive sleep apnea)     Gout     CKD (chronic kidney disease)     Mixed hyperlipidemia     Squamous cell carcinoma of face     Type 2 diabetes mellitus without complication, without long-term current use of insulin ([address])     Need for prophylactic vaccination and inoculation against varicella     Vitamin D deficiency     BMI 50.0-59.9, adult ([address])     Type 2 diabetes mellitus with hyperglycemia     Diabetes mellitus ([address])     Back spasm     Secondary hypertension     Type 2 diabetes mellitus with chronic kidney disease     Body mass index (BMI) 45.0-49.9, adult ([address])      ----JF

## 2023-07-10 NOTE — TELEPHONE ENCOUNTER
E-scribe request for PENDED MEDICATIONS. Please review and e-scribe if applicable. Last Visit Date:  6/2/2023  Next Visit Date:  7/8/2023    Hemoglobin A1C (%)   Date Value   04/13/2023 6.6   11/14/2022 6.4   08/01/2022 7.1             ( goal A1C is < 7)   Microalb/Crt.  Ratio (mcg/mg creat)   Date Value   02/01/2023 1,872 (H)     LDL Cholesterol (mg/dL)   Date Value   04/20/2022 127       (goal LDL is <100)   AST (U/L)   Date Value   04/20/2022 15     ALT (U/L)   Date Value   04/20/2022 13     BUN (mg/dL)   Date Value   11/14/2022 25 (H)     BP Readings from Last 3 Encounters:   06/02/23 126/78   04/13/23 (!) 160/80   12/08/22 132/82          (goal 120/80)        Patient Active Problem List:     Essential hypertension     Chronic back pain     Morbid obesity with BMI of 50.0-59.9, adult ([facility])     ZURDO (obstructive sleep apnea)     Gout     CKD (chronic kidney disease)     Mixed hyperlipidemia     Squamous cell carcinoma of face     Type 2 diabetes mellitus without complication, without long-term current use of insulin ([address])     Need for prophylactic vaccination and inoculation against varicella     Vitamin D deficiency     BMI 50.0-59.9, adult ([address])     Type 2 diabetes mellitus with hyperglycemia     Diabetes mellitus ([address])     Back spasm     Secondary hypertension     Type 2 diabetes mellitus with chronic kidney disease     Body mass index (BMI) 45.0-49.9, adult ([address])      ----JF

## 2023-07-14 DIAGNOSIS — I10 ESSENTIAL HYPERTENSION: ICD-10-CM

## 2023-07-14 NOTE — TELEPHONE ENCOUNTER
Last visit: 06/02/2023  Last Med refill: 04/13/2023  Does patient have enough medication for 72 hours: No:     Next Visit Date:  Future Appointments   Date Time Provider [address]   8/1/2023  8:40 AM Alondra Hassan  [facility],[place] [address] Maintenance   Topic Date Due    Diabetic retinal exam  Never done    Diabetic foot exam  02/19/2022    Annual Wellness Visit (AWV)  05/07/2022    Lipids  04/20/2023    Flu vaccine (1) 08/01/2023    GFR test (Diabetes, CKD 3-4, OR last GFR 15-59)  11/14/2023    Diabetic Alb to Cr ratio (uACR) test  02/01/2024    A1C test (Diabetic or Prediabetic)  04/13/2024    Depression Screen  06/02/2024    Colorectal Cancer Screen  04/19/2026    DTaP/Tdap/Td vaccine (4 - Td or Tdap) 07/10/2032    Hepatitis B vaccine  Completed    Shingles vaccine  Completed    Pneumococcal 0-64 years Vaccine  Completed    COVID-19 Vaccine  Completed    Hepatitis C screen  Completed    HIV screen  Completed    Hepatitis A vaccine  Aged Out    Hib vaccine  Aged Out    Meningococcal (ACWY) vaccine  Aged Out       Hemoglobin A1C (%)   Date Value   04/13/2023 6.6   11/14/2022 6.4   08/01/2022 7.1             ( goal A1C is < 7)   Microalb/Crt.  Ratio (mcg/mg creat)   Date Value   02/01/2023 1,872 (H)     LDL Cholesterol (mg/dL)   Date Value   04/20/2022 127   09/22/2020 67       (goal LDL is <100)   AST (U/L)   Date Value   04/20/2022 15     ALT (U/L)   Date Value   04/20/2022 13     BUN (mg/dL)   Date Value   11/14/2022 25 (H)     BP Readings from Last 3 Encounters:   06/02/23 126/78   04/13/23 (!) 160/80   12/08/22 132/82          (goal 120/80)    All Future Testing planned in [facility]  Lab Frequency Next Occurrence   MRI LUMBAR SPINE WO CONTRAST Once 10/10/2022   Sleep Study with PAP Titration Once 11/14/2022   Lipid Panel Once 06/02/2023               Patient Active Problem List:     Essential hypertension     Chronic back pain     Morbid obesity with BMI of 50.0-59.9, adult (HCC)     ZURDO

## 2023-07-15 RX ORDER — AMLODIPINE AND VALSARTAN 10; 320 MG/1; MG/1
TABLET ORAL
Qty: 30 TABLET | Refills: 11 | Status: SHIPPED | OUTPATIENT
Start: 2023-07-15

## 2023-07-31 ENCOUNTER — HOSPITAL ENCOUNTER (OUTPATIENT)
Age: 53
Setting detail: SPECIMEN
Discharge: HOME | End: 2023-07-31

## 2023-07-31 DIAGNOSIS — E11.22 TYPE 2 DIABETES MELLITUS WITH STAGE 3A CHRONIC KIDNEY DISEASE, WITHOUT LONG-TERM CURRENT USE OF INSULIN (HCC): ICD-10-CM

## 2023-07-31 DIAGNOSIS — N18.31 TYPE 2 DIABETES MELLITUS WITH STAGE 3A CHRONIC KIDNEY DISEASE, WITHOUT LONG-TERM CURRENT USE OF INSULIN (HCC): ICD-10-CM

## 2023-07-31 DIAGNOSIS — I10 ESSENTIAL HYPERTENSION: ICD-10-CM

## 2023-07-31 LAB
CHOLEST SERPL-MCNC: 128 MG/DL
CHOLESTEROL/HDL RATIO: 3.8
HDLC SERPL-MCNC: 34 MG/DL
LDLC SERPL CALC-MCNC: 66 MG/DL (ref 0–130)
TRIGL SERPL-MCNC: 142 MG/DL

## 2023-08-01 ENCOUNTER — OFFICE VISIT (OUTPATIENT)
Dept: FAMILY MEDICINE CLINIC | Age: 53
End: 2023-08-01
Payer: MEDICARE

## 2023-08-01 VITALS
SYSTOLIC BLOOD PRESSURE: 118 MMHG | BODY MASS INDEX: 45.92 KG/M2 | HEART RATE: 85 BPM | DIASTOLIC BLOOD PRESSURE: 73 MMHG | WEIGHT: 315 LBS

## 2023-08-01 DIAGNOSIS — E11.9 TYPE 2 DIABETES MELLITUS WITHOUT COMPLICATION, WITHOUT LONG-TERM CURRENT USE OF INSULIN (HCC): ICD-10-CM

## 2023-08-01 DIAGNOSIS — G89.29 CHRONIC BILATERAL LOW BACK PAIN WITHOUT SCIATICA: ICD-10-CM

## 2023-08-01 DIAGNOSIS — M54.50 CHRONIC BILATERAL LOW BACK PAIN WITHOUT SCIATICA: ICD-10-CM

## 2023-08-01 DIAGNOSIS — I10 ESSENTIAL HYPERTENSION: Primary | ICD-10-CM

## 2023-08-01 LAB — HBA1C MFR BLD: 6.5 %

## 2023-08-01 PROCEDURE — 3074F SYST BP LT 130 MM HG: CPT

## 2023-08-01 PROCEDURE — 3017F COLORECTAL CA SCREEN DOC REV: CPT

## 2023-08-01 PROCEDURE — 2022F DILAT RTA XM EVC RTNOPTHY: CPT

## 2023-08-01 PROCEDURE — 83037 HB GLYCOSYLATED A1C HOME DEV: CPT

## 2023-08-01 PROCEDURE — 99213 OFFICE O/P EST LOW 20 MIN: CPT

## 2023-08-01 PROCEDURE — 3078F DIAST BP <80 MM HG: CPT

## 2023-08-01 PROCEDURE — 1036F TOBACCO NON-USER: CPT

## 2023-08-01 PROCEDURE — 3044F HG A1C LEVEL LT 7.0%: CPT

## 2023-08-01 PROCEDURE — G8427 DOCREV CUR MEDS BY ELIG CLIN: HCPCS

## 2023-08-01 PROCEDURE — G8417 CALC BMI ABV UP PARAM F/U: HCPCS

## 2023-08-01 RX ORDER — ACETAMINOPHEN 500 MG
TABLET ORAL
Qty: 180 TABLET | Refills: 1 | Status: SHIPPED | OUTPATIENT
Start: 2023-08-01

## 2023-08-01 ASSESSMENT — ENCOUNTER SYMPTOMS
VOMITING: 0
DIARRHEA: 0
SORE THROAT: 0
CONSTIPATION: 0
ABDOMINAL PAIN: 0
NAUSEA: 0
RHINORRHEA: 0
SHORTNESS OF BREATH: 0

## 2023-08-01 NOTE — PROGRESS NOTES
Attending Physician Statement  I have discussed the care of Becky Young, including pertinent history and exam findings,  with the resident. I have reviewed the key elements of all parts of the encounter with the resident. I agree with the assessment, plan and orders as documented by the resident.   (Tia Gian)    Hussein Arias MD
Visit Information    Have you changed or started any medications since your last visit including any over-the-counter medicines, vitamins, or herbal medicines? no   Have you stopped taking any of your medications? Is so, why? -  no  Are you having any side effects from any of your medications? - no    Have you seen any other physician or provider since your last visit?  no   Have you had any other diagnostic tests since your last visit?  no   Have you been seen in the emergency room and/or had an admission in a hospital since we last saw you?  no   Have you had your routine dental cleaning in the past 6 months?  no     Do you have an active MyChart account? If no, what is the barrier?   Yes    Patient Care Team:  Tyler Gonzalez MD as PCP - General (Family Medicine)  [facility]    Medical History Review  Past Medical, Family, and Social History reviewed and does not contribute to the patient presenting condition    Health Maintenance   Topic Date Due    Diabetic retinal exam  Never done    Diabetic foot exam  02/19/2022    Annual Wellness Visit (AWV)  05/07/2022    Flu vaccine (1) 08/01/2023    GFR test (Diabetes, CKD 3-4, OR last GFR 15-59)  11/14/2023    Diabetic Alb to Cr ratio (uACR) test  02/01/2024    A1C test (Diabetic or Prediabetic)  04/13/2024    Depression Screen  06/02/2024    Lipids  07/31/2024    Colorectal Cancer Screen  04/19/2026    DTaP/Tdap/Td vaccine (4 - Td or Tdap) 07/10/2032    Hepatitis B vaccine  Completed    Shingles vaccine  Completed    Pneumococcal 0-64 years Vaccine  Completed    COVID-19 Vaccine  Completed    Hepatitis C screen  Completed    HIV screen  Completed    Hepatitis A vaccine  Aged Out    Hib vaccine  Aged Out    Meningococcal (ACWY) vaccine  Aged Out
Prescriptions     Signed Prescriptions Disp Refills    acetaminophen (V-R NON-ASPIRIN) 500 MG tablet 180 tablet 1     Sig: take 2 tablet by mouth every 6 hours       Medications Discontinued During This Encounter   Medication Reason    V-R NON-ASPIRIN 500 MG tablet Verito Rivera received counseling on the following healthy behaviors: nutrition, exercise and medication adherence    Discussed use,benefit, and side effects of prescribed medications. Barriers to medication compliance addressed. All patient questions answered. Pt voiced understanding. No follow-ups on file. Disclaimer: Some orall of this note was transcribed using voice-recognition software. This may cause typographical errors occasionally. Although all effort is made to fix these errors, please do not hesitate to contact our office if there Sarah Backers concern with the understanding of this note.

## 2023-08-04 DIAGNOSIS — I10 ESSENTIAL HYPERTENSION: ICD-10-CM

## 2023-08-04 RX ORDER — CARVEDILOL 25 MG/1
TABLET ORAL
Qty: 60 TABLET | Refills: 1 | Status: SHIPPED | OUTPATIENT
Start: 2023-08-04

## 2023-08-04 NOTE — TELEPHONE ENCOUNTER
Last visit: 08/01/2023  Last Med refill: 07/10/2023  Does patient have enough medication for 72 hours: No:     Next Visit Date:  Future Appointments   Date Time Provider [address]   11/3/2023  9:00 AM Mini Avelar  [facility],[place] [address] Maintenance   Topic Date Due    Diabetic retinal exam  Never done    Annual Wellness Visit (AWV)  05/07/2022    Flu vaccine (1) 08/01/2023    GFR test (Diabetes, CKD 3-4, OR last GFR 15-59)  11/14/2023    Diabetic Alb to Cr ratio (uACR) test  02/01/2024    Depression Screen  06/02/2024    Lipids  07/31/2024    Diabetic foot exam  08/01/2024    A1C test (Diabetic or Prediabetic)  08/01/2024    Colorectal Cancer Screen  04/19/2026    DTaP/Tdap/Td vaccine (4 - Td or Tdap) 07/10/2032    Hepatitis B vaccine  Completed    Shingles vaccine  Completed    Pneumococcal 0-64 years Vaccine  Completed    COVID-19 Vaccine  Completed    Hepatitis C screen  Completed    HIV screen  Completed    Hepatitis A vaccine  Aged Out    Hib vaccine  Aged Out    Meningococcal (ACWY) vaccine  Aged Out       Hemoglobin A1C (%)   Date Value   08/01/2023 6.5   04/13/2023 6.6   11/14/2022 6.4             ( goal A1C is < 7)   No components found for: LABMICR  LDL Cholesterol (mg/dL)   Date Value   07/31/2023 66   04/20/2022 127       (goal LDL is <100)   AST (U/L)   Date Value   04/20/2022 15     ALT (U/L)   Date Value   04/20/2022 13     BUN (mg/dL)   Date Value   11/14/2022 25 (H)     BP Readings from Last 3 Encounters:   08/01/23 118/73   06/02/23 126/78   04/13/23 (!) 160/80          (goal 120/80)    All Future Testing planned in [facility]  Lab Frequency Next Occurrence   MRI LUMBAR SPINE WO CONTRAST Once 10/10/2022   Sleep Study with PAP Titration Once 11/14/2022               Patient Active Problem List:     Essential hypertension     Chronic back pain     Morbid obesity with BMI of 50.0-59.9, adult (HCC)     ZURDO (obstructive sleep apnea)     Gout     CKD (chronic kidney disease)

## 2023-09-08 DIAGNOSIS — I10 ESSENTIAL HYPERTENSION: ICD-10-CM

## 2023-09-08 RX ORDER — ATORVASTATIN CALCIUM 40 MG/1
TABLET, FILM COATED ORAL
Qty: 30 TABLET | Refills: 11 | Status: SHIPPED | OUTPATIENT
Start: 2023-09-08

## 2023-09-08 NOTE — TELEPHONE ENCOUNTER
Mixed hyperlipidemia     Squamous cell carcinoma of face     Type 2 diabetes mellitus without complication, without long-term current use of insulin (HCC)     Need for prophylactic vaccination and inoculation against varicella     Vitamin D deficiency     BMI 50.0-59.9, adult ([address])     Type 2 diabetes mellitus with hyperglycemia     Diabetes mellitus ([address])     Back spasm     Secondary hypertension     Type 2 diabetes mellitus with chronic kidney disease     Body mass index (BMI) 45.0-49.9, adult ([facility])

## 2023-10-10 DIAGNOSIS — I10 ESSENTIAL HYPERTENSION: ICD-10-CM

## 2023-10-10 RX ORDER — CARVEDILOL 25 MG/1
TABLET ORAL
Qty: 60 TABLET | Refills: 11 | Status: SHIPPED | OUTPATIENT
Start: 2023-10-10

## 2023-10-27 RX ORDER — ASPIRIN 81 MG/1
81 TABLET ORAL DAILY
Qty: 30 TABLET | Refills: 5 | Status: SHIPPED | OUTPATIENT
Start: 2023-10-27

## 2023-10-27 NOTE — TELEPHONE ENCOUNTER
Last visit: 8/1/23  Last Med refill: 8/1/23  Does patient have enough medication for 72 hours: Yes    Next Visit Date:  Future Appointments   Date Time Provider [address]   11/3/2023  9:00 AM Shanae Ware MD [address] [place] [address],[place] [address] Maintenance   Topic Date Due    Diabetic retinal exam  Never done    Annual Wellness Visit (AWV)  05/07/2022    Flu vaccine (1) 08/01/2023    GFR test (Diabetes, CKD 3-4, OR last GFR 15-59)  11/14/2023    Diabetic Alb to Cr ratio (uACR) test  02/01/2024    Depression Screen  06/02/2024    Lipids  07/31/2024    Diabetic foot exam  08/01/2024    A1C test (Diabetic or Prediabetic)  08/01/2024    Colorectal Cancer Screen  04/19/2026    DTaP/Tdap/Td vaccine (4 - Td or Tdap) 07/10/2032    Hepatitis B vaccine  Completed    Shingles vaccine  Completed    Pneumococcal 0-64 years Vaccine  Completed    COVID-19 Vaccine  Completed    Hepatitis C screen  Completed    HIV screen  Completed    Hepatitis A vaccine  Aged Out    Hib vaccine  Aged Out    Meningococcal (ACWY) vaccine  Aged Out       Hemoglobin A1C (%)   Date Value   08/01/2023 6.5   04/13/2023 6.6   11/14/2022 6.4             ( goal A1C is < 7)   No components found for: \"LABMICR\"  LDL Cholesterol (mg/dL)   Date Value   07/31/2023 66   04/20/2022 127       (goal LDL is <100)   AST (U/L)   Date Value   04/20/2022 15     ALT (U/L)   Date Value   04/20/2022 13     BUN (mg/dL)   Date Value   11/14/2022 25 (H)     BP Readings from Last 3 Encounters:   08/01/23 118/73   06/02/23 126/78   04/13/23 (!) 160/80          (goal 120/80)    All Future Testing planned in [facility]  Lab Frequency Next Occurrence   Sleep Study with PAP Titration Once 11/14/2022               Patient Active Problem List:     Essential hypertension     Chronic back pain     Morbid obesity with BMI of 50.0-59.9, adult (HCC)     ZURDO (obstructive sleep apnea)     Gout     CKD (chronic kidney disease)     Mixed hyperlipidemia     Squamous cell carcinoma of

## 2023-12-08 ENCOUNTER — OFFICE VISIT (OUTPATIENT)
Dept: FAMILY MEDICINE CLINIC | Age: 53
End: 2023-12-08
Payer: MEDICARE

## 2023-12-08 ENCOUNTER — HOSPITAL ENCOUNTER (OUTPATIENT)
Age: 53
Setting detail: SPECIMEN
Discharge: HOME | End: 2023-12-08
Payer: MEDICARE

## 2023-12-08 VITALS
HEIGHT: 70 IN | WEIGHT: 315 LBS | BODY MASS INDEX: 45.1 KG/M2 | HEART RATE: 63 BPM | DIASTOLIC BLOOD PRESSURE: 84 MMHG | SYSTOLIC BLOOD PRESSURE: 137 MMHG

## 2023-12-08 DIAGNOSIS — M54.41 CHRONIC MIDLINE LOW BACK PAIN WITH RIGHT-SIDED SCIATICA: ICD-10-CM

## 2023-12-08 DIAGNOSIS — G89.29 CHRONIC MIDLINE LOW BACK PAIN WITH RIGHT-SIDED SCIATICA: ICD-10-CM

## 2023-12-08 DIAGNOSIS — I10 ESSENTIAL HYPERTENSION: Primary | ICD-10-CM

## 2023-12-08 DIAGNOSIS — R06.83 SNORING: ICD-10-CM

## 2023-12-08 DIAGNOSIS — E11.22 TYPE 2 DIABETES MELLITUS WITH STAGE 3 CHRONIC KIDNEY DISEASE, WITHOUT LONG-TERM CURRENT USE OF INSULIN, UNSPECIFIED WHETHER STAGE 3A OR 3B CKD (HCC): ICD-10-CM

## 2023-12-08 DIAGNOSIS — N18.30 TYPE 2 DIABETES MELLITUS WITH STAGE 3 CHRONIC KIDNEY DISEASE, WITHOUT LONG-TERM CURRENT USE OF INSULIN, UNSPECIFIED WHETHER STAGE 3A OR 3B CKD (HCC): ICD-10-CM

## 2023-12-08 PROBLEM — N18.32 STAGE 3B CHRONIC KIDNEY DISEASE (HCC): Status: ACTIVE | Noted: 2022-07-28

## 2023-12-08 PROBLEM — C85.99: Status: ACTIVE | Noted: 2022-09-23

## 2023-12-08 LAB
ANION GAP SERPL CALCULATED.3IONS-SCNC: 10 MMOL/L (ref 9–17)
BUN SERPL-MCNC: 18 MG/DL (ref 6–20)
CALCIUM SERPL-MCNC: 9.5 MG/DL (ref 8.6–10.4)
CHLORIDE SERPL-SCNC: 105 MMOL/L (ref 98–107)
CO2 SERPL-SCNC: 26 MMOL/L (ref 20–31)
CREAT SERPL-MCNC: 1.1 MG/DL (ref 0.7–1.2)
GFR SERPL CREATININE-BSD FRML MDRD: >60 ML/MIN/1.73M2
GLUCOSE SERPL-MCNC: 134 MG/DL (ref 70–99)
HBA1C MFR BLD: 6.2 %
POTASSIUM SERPL-SCNC: 5 MMOL/L (ref 3.7–5.3)
SODIUM SERPL-SCNC: 141 MMOL/L (ref 135–144)

## 2023-12-08 PROCEDURE — 90686 IIV4 VACC NO PRSV 0.5 ML IM: CPT | Performed by: STUDENT IN AN ORGANIZED HEALTH CARE EDUCATION/TRAINING PROGRAM

## 2023-12-08 PROCEDURE — 99213 OFFICE O/P EST LOW 20 MIN: CPT

## 2023-12-08 PROCEDURE — 80048 BASIC METABOLIC PNL TOTAL CA: CPT

## 2023-12-08 PROCEDURE — 3075F SYST BP GE 130 - 139MM HG: CPT

## 2023-12-08 PROCEDURE — 36415 COLL VENOUS BLD VENIPUNCTURE: CPT

## 2023-12-08 PROCEDURE — 3079F DIAST BP 80-89 MM HG: CPT

## 2023-12-08 PROCEDURE — 3044F HG A1C LEVEL LT 7.0%: CPT

## 2023-12-08 PROCEDURE — 83036 HEMOGLOBIN GLYCOSYLATED A1C: CPT

## 2023-12-08 RX ORDER — GABAPENTIN 300 MG/1
300 CAPSULE ORAL 3 TIMES DAILY
Qty: 90 CAPSULE | Refills: 0 | Status: SHIPPED | OUTPATIENT
Start: 2023-12-08 | End: 2024-03-07

## 2023-12-08 RX ORDER — HYDRALAZINE HYDROCHLORIDE 50 MG/1
TABLET, FILM COATED ORAL
COMMUNITY
Start: 2023-11-30

## 2023-12-08 RX ORDER — BACLOFEN 10 MG/1
1 TABLET ORAL NIGHTLY
COMMUNITY

## 2023-12-08 ASSESSMENT — ENCOUNTER SYMPTOMS
DIARRHEA: 0
COUGH: 0
VOMITING: 0
CONSTIPATION: 0
WHEEZING: 0
SHORTNESS OF BREATH: 0
ABDOMINAL PAIN: 0
NAUSEA: 0

## 2023-12-08 NOTE — PROGRESS NOTES
Jessica Moreno (:  1970) is a 46 y.o. male,Established patient, here for evaluation of the following chief complaint(s):  Hypertension (Last seen 23) and Diabetes (Last A1C was 6.5 on 23)         ASSESSMENT/PLAN:  1. Essential hypertension  -Blood pressure is controlled. Continue current treatment with Exforge, Coreg and hydralazine  2. Type 2 diabetes mellitus with stage 3 chronic kidney disease, without long-term current use of insulin, unspecified whether stage 3a or 3b CKD (HCC)  -  A1c is 6.2.  -Continue metformin    POCT glycosylated hemoglobin (Hb A1C)  -     Basic Metabolic Panel; Future  3. Chronic midline low back pain with right-sided sciatica  -     gabapentin (NEURONTIN) 300 MG capsule; Take 1 capsule by mouth 3 times daily for 90 days. Intended supply: 90 days, Disp-90 capsule, R-0Normal  4. Renny Gilmore MD, Pulmonology, [facility]      Return in about 6 days (around 2023). Subjective   SUBJECTIVE/OBJECTIVE:  46years old male patient with past medical history of hypertension, type 2 diabetes came to the office for hypertension follow-up. First blood pressure reading was 179/98, dropped to 137/84 on repeat. Patient is compliant with elevation medications. He does not check his blood pressure at home. He has no complaints today. Denies any headache, blurry vision, chest pain, shortness breath, weakness or numbness. Patient is high risk for ZURDO. He reported snoring at night. He said that he tried to do sleep study in the past but he could not finish it because he is unable to lay on his back due to severe back pain. A1c today 6.2 down from 6.5. Patient is on metformin. Review of Systems   Constitutional:  Negative for diaphoresis, fatigue and fever. Respiratory:  Negative for cough, shortness of breath and wheezing. Cardiovascular:  Negative for chest pain, palpitations and leg swelling.    Gastrointestinal:  Negative for

## 2023-12-08 NOTE — PROGRESS NOTES
Visit Information    Have you changed or started any medications since your last visit including any over-the-counter medicines, vitamins, or herbal medicines? no   Have you stopped taking any of your medications? Is so, why? -  no  Are you having any side effects from any of your medications? - no    Have you seen any other physician or provider since your last visit?  no   Have you had any other diagnostic tests since your last visit?  no   Have you been seen in the emergency room and/or had an admission in a hospital since we last saw you?  no   Have you had your routine dental cleaning in the past 6 months?  no     Do you have an active MyChart account? If no, what is the barrier?   Yes    Patient Care Team:  Clark Hogue MD as PCP - General (Family Medicine)    Medical History Review  Past Medical, Family, and Social History reviewed and does contribute to the patient presenting condition    Health Maintenance   Topic Date Due    Diabetic retinal exam  Never done    Annual Wellness Visit (AWV)  05/07/2022    Flu vaccine (1) 08/01/2023    COVID-19 Vaccine (5 - 2023-24 season) 09/01/2023    GFR test (Diabetes, CKD 3-4, OR last GFR 15-59)  11/14/2023    Diabetic Alb to Cr ratio (uACR) test  02/01/2024    Depression Screen  06/02/2024    Lipids  07/31/2024    Diabetic foot exam  08/01/2024    A1C test (Diabetic or Prediabetic)  08/01/2024    Colorectal Cancer Screen  04/19/2026    DTaP/Tdap/Td vaccine (4 - Td or Tdap) 07/10/2032    Hepatitis B vaccine  Completed    Shingles vaccine  Completed    Pneumococcal 0-64 years Vaccine  Completed    Hepatitis C screen  Completed    HIV screen  Completed    Hepatitis A vaccine  Aged Out    Hib vaccine  Aged Out    Meningococcal (ACWY) vaccine  Aged Out

## 2023-12-12 DIAGNOSIS — E11.9 TYPE 2 DIABETES MELLITUS WITHOUT COMPLICATION, WITHOUT LONG-TERM CURRENT USE OF INSULIN (HCC): ICD-10-CM

## 2023-12-12 DIAGNOSIS — M1A.9XX1 CHRONIC GOUT INVOLVING TOE OF LEFT FOOT WITH TOPHUS, UNSPECIFIED CAUSE: ICD-10-CM

## 2023-12-13 RX ORDER — ALLOPURINOL 100 MG/1
TABLET ORAL
Qty: 90 TABLET | Refills: 11 | Status: SHIPPED | OUTPATIENT
Start: 2023-12-13

## 2023-12-26 ENCOUNTER — OFFICE VISIT (OUTPATIENT)
Dept: FAMILY MEDICINE CLINIC | Age: 53
End: 2023-12-26
Payer: MEDICARE

## 2023-12-26 VITALS
HEART RATE: 62 BPM | HEIGHT: 70 IN | DIASTOLIC BLOOD PRESSURE: 92 MMHG | BODY MASS INDEX: 45.1 KG/M2 | SYSTOLIC BLOOD PRESSURE: 176 MMHG | WEIGHT: 315 LBS

## 2023-12-26 DIAGNOSIS — I10 ESSENTIAL HYPERTENSION: ICD-10-CM

## 2023-12-26 DIAGNOSIS — Z00.00 MEDICARE ANNUAL WELLNESS VISIT, SUBSEQUENT: Primary | ICD-10-CM

## 2023-12-26 PROCEDURE — 3080F DIAST BP >= 90 MM HG: CPT

## 2023-12-26 PROCEDURE — 3077F SYST BP >= 140 MM HG: CPT

## 2023-12-26 PROCEDURE — G0439 PPPS, SUBSEQ VISIT: HCPCS

## 2023-12-26 PROCEDURE — G8482 FLU IMMUNIZE ORDER/ADMIN: HCPCS

## 2023-12-26 PROCEDURE — 3017F COLORECTAL CA SCREEN DOC REV: CPT

## 2023-12-26 NOTE — PATIENT INSTRUCTIONS
Thank you for letting us take care of you today. We hope all your questions were addressed. If a question was overlooked or something else comes to mind after you return home, please contact a member of your Care Team listed below. Your Care Team at [facility] is Team #2  Dang Holt M.D. (Faculty)  Penny Rodriguez, (Resident)  Radha Salter, (Resident)  Michael Cole, (Resident)  Carley Almanza, (Resident)  Em Winters, (Resident)  Dwayne Diaz, [address] [place] [address], [address] [place] [address], [facility]  Rufino Trujillo,  A  Flavia Lai, [facility]  [place], [facility] access hospital  Maya Butt, [facility]  [place]) [place], [place] ([facility])  [facility] (Clinical Pharmacist)     Office phone number: [phone]    If you need to get in right away due to illness, please be advised we have \"Same Day\" appointments available Monday-Friday. Please call us at [phone] option #3 to schedule your \"Same Day\" appointment. Substance Use Disorder: Care Instructions  Overview     You can improve your life and health by stopping your use of alcohol or drugs. When you don't drink or use drugs, you may feel and sleep better. You may get along better with your family, friends, and coworkers. There are medicines and programs that can help with substance use disorder. How can you care for yourself at home? Here are some ways to help you stay sober and prevent relapse. If you have been given medicine to help keep you sober or reduce your cravings, be sure to take it exactly as prescribed. Talk to your doctor about programs that can help you stop using drugs or drinking alcohol. Do not keep alcohol or drugs in your home. Plan ahead. Think about what you'll say if other people ask you to drink or use drugs. Try not to spend time with people who drink or use drugs. Use the time and money spent on drinking or drugs to do something that's important to you.   Preventing a

## 2024-01-05 RX ORDER — HYDRALAZINE HYDROCHLORIDE 50 MG/1
50 TABLET, FILM COATED ORAL 3 TIMES DAILY
Qty: 90 TABLET | Refills: 1 | Status: SHIPPED | OUTPATIENT
Start: 2024-01-05

## 2024-01-05 NOTE — TELEPHONE ENCOUNTER
Last visit: 12/26/2023  Last Med refill: 11/30/2023  Does patient have enough medication for 72 hours: No:     Next Visit Date:  Future Appointments   Date Time Provider Department Center   1/29/2024  8:30 AM [facility]   3/19/2024  2:30 PM Uzair Mario MD Resp [facility]       Health Maintenance   Topic Date Due    Diabetic retinal exam  Never done    COVID-19 Vaccine (5 - 2023-24 season) 09/01/2023    Annual Wellness Visit (Medicare Advantage)  01/01/2024    Diabetic Alb to Cr ratio (uACR) test  02/01/2024    Lipids  07/31/2024    Diabetic foot exam  08/01/2024    A1C test (Diabetic or Prediabetic)  12/08/2024    GFR test (Diabetes, CKD 3-4, OR last GFR 15-59)  12/08/2024    Depression Screen  12/26/2024    Colorectal Cancer Screen  04/19/2026    DTaP/Tdap/Td vaccine (4 - Td or Tdap) 07/10/2032    Hepatitis B vaccine  Completed    Flu vaccine  Completed    Shingles vaccine  Completed    Pneumococcal 0-64 years Vaccine  Completed    Hepatitis C screen  Completed    HIV screen  Completed    Hepatitis A vaccine  Aged Out    Hib vaccine  Aged Out    Polio vaccine  Aged Out    Meningococcal (ACWY) vaccine  Aged Out       Hemoglobin A1C (%)   Date Value   12/08/2023 6.2   08/01/2023 6.5   04/13/2023 6.6             ( goal A1C is < 7)   No components found for: \"LABMICR\"  LDL Cholesterol (mg/dL)   Date Value   07/31/2023 66   04/20/2022 127       (goal LDL is <100)   AST (U/L)   Date Value   04/20/2022 15     ALT (U/L)   Date Value   04/20/2022 13     BUN (mg/dL)   Date Value   12/08/2023 18     BP Readings from Last 3 Encounters:   12/26/23 (!) 176/92   12/08/23 137/84   08/01/23 118/73          (goal 120/80)    All Future Testing planned in [facility]  Lab Frequency Next Occurrence               Patient Active Problem List:     Essential hypertension     Chronic back pain     Morbid obesity with BMI of 50.0-59.9, adult (HCC)     ZURDO (obstructive sleep apnea)     Gout     CKD (chronic

## 2024-01-11 DIAGNOSIS — M54.41 CHRONIC MIDLINE LOW BACK PAIN WITH RIGHT-SIDED SCIATICA: ICD-10-CM

## 2024-01-11 DIAGNOSIS — G89.29 CHRONIC MIDLINE LOW BACK PAIN WITH RIGHT-SIDED SCIATICA: ICD-10-CM

## 2024-01-11 RX ORDER — GABAPENTIN 300 MG/1
300 CAPSULE ORAL 3 TIMES DAILY
Qty: 90 CAPSULE | Refills: 0 | Status: SHIPPED | OUTPATIENT
Start: 2024-01-11 | End: 2024-02-10

## 2024-01-11 NOTE — TELEPHONE ENCOUNTER
disease)     Mixed hyperlipidemia     Squamous cell carcinoma of face     Type 2 diabetes mellitus without complication, without long-term current use of insulin (HCC)     Need for prophylactic vaccination and inoculation against varicella     Vitamin D deficiency     BMI 50.0-59.9, adult (HCC)     Type 2 diabetes mellitus with hyperglycemia     Diabetes mellitus (HCC)     Back spasm     Secondary hypertension     Type 2 diabetes mellitus with chronic kidney disease     Body mass index (BMI) 45.0-49.9, adult (HCC)     Primary cutaneous lymphoma (HCC)     Stage 3b chronic kidney disease (HCC)

## 2024-02-02 ENCOUNTER — OFFICE VISIT (OUTPATIENT)
Dept: FAMILY MEDICINE CLINIC | Age: 54
End: 2024-02-02

## 2024-02-02 ENCOUNTER — HOSPITAL ENCOUNTER (OUTPATIENT)
Age: 54
Setting detail: SPECIMEN
Discharge: HOME | End: 2024-02-02

## 2024-02-02 VITALS
DIASTOLIC BLOOD PRESSURE: 99 MMHG | WEIGHT: 315 LBS | SYSTOLIC BLOOD PRESSURE: 179 MMHG | HEART RATE: 74 BPM | BODY MASS INDEX: 46.49 KG/M2

## 2024-02-02 DIAGNOSIS — G47.33 OSA (OBSTRUCTIVE SLEEP APNEA): ICD-10-CM

## 2024-02-02 DIAGNOSIS — R73.03 PREDIABETES: ICD-10-CM

## 2024-02-02 DIAGNOSIS — I10 ESSENTIAL HYPERTENSION: Primary | ICD-10-CM

## 2024-02-02 DIAGNOSIS — L98.9 SCALP LESION: ICD-10-CM

## 2024-02-02 DIAGNOSIS — E11.21 MACROALBUMINURIC DIABETIC NEPHROPATHY (HCC): ICD-10-CM

## 2024-02-02 DIAGNOSIS — I10 ESSENTIAL HYPERTENSION: ICD-10-CM

## 2024-02-02 LAB
ANION GAP SERPL CALCULATED.3IONS-SCNC: 10 MMOL/L (ref 9–17)
BUN SERPL-MCNC: 19 MG/DL (ref 6–20)
CALCIUM SERPL-MCNC: 9.7 MG/DL (ref 8.6–10.4)
CHLORIDE SERPL-SCNC: 103 MMOL/L (ref 98–107)
CO2 SERPL-SCNC: 27 MMOL/L (ref 20–31)
CREAT SERPL-MCNC: 1.1 MG/DL (ref 0.7–1.2)
CREAT UR-MCNC: 83.7 MG/DL (ref 39–259)
GFR SERPL CREATININE-BSD FRML MDRD: >60 ML/MIN/1.73M2
GLUCOSE SERPL-MCNC: 145 MG/DL (ref 70–99)
MICROALBUMIN UR-MCNC: 1986 MG/L
MICROALBUMIN/CREAT UR-RTO: 2373 MCG/MG CREAT
POTASSIUM SERPL-SCNC: 5.1 MMOL/L (ref 3.7–5.3)
SODIUM SERPL-SCNC: 140 MMOL/L (ref 135–144)

## 2024-02-02 PROCEDURE — 99213 OFFICE O/P EST LOW 20 MIN: CPT

## 2024-02-02 PROCEDURE — 3077F SYST BP >= 140 MM HG: CPT

## 2024-02-02 PROCEDURE — 3080F DIAST BP >= 90 MM HG: CPT

## 2024-02-02 RX ORDER — HYDRALAZINE HYDROCHLORIDE 100 MG/1
100 TABLET, FILM COATED ORAL 3 TIMES DAILY
Qty: 90 TABLET | Refills: 3 | Status: SHIPPED | OUTPATIENT
Start: 2024-02-02

## 2024-02-02 RX ORDER — AMLODIPINE, VALSARTAN AND HYDROCHLOROTHIAZIDE 5; 160; 12.5 MG/1; MG/1; MG/1
1 TABLET ORAL DAILY
Qty: 30 TABLET | Refills: 3 | Status: SHIPPED | OUTPATIENT
Start: 2024-02-02

## 2024-02-02 ASSESSMENT — PATIENT HEALTH QUESTIONNAIRE - PHQ9
1. LITTLE INTEREST OR PLEASURE IN DOING THINGS: 0
SUM OF ALL RESPONSES TO PHQ QUESTIONS 1-9: 0
SUM OF ALL RESPONSES TO PHQ9 QUESTIONS 1 & 2: 0
SUM OF ALL RESPONSES TO PHQ QUESTIONS 1-9: 0
2. FEELING DOWN, DEPRESSED OR HOPELESS: NOT AT ALL
2. FEELING DOWN, DEPRESSED OR HOPELESS: 0
1. LITTLE INTEREST OR PLEASURE IN DOING THINGS: NOT AT ALL
SUM OF ALL RESPONSES TO PHQ QUESTIONS 1-9: 0
SUM OF ALL RESPONSES TO PHQ9 QUESTIONS 1 & 2: 0
SUM OF ALL RESPONSES TO PHQ QUESTIONS 1-9: 0

## 2024-02-02 NOTE — PROGRESS NOTES
Attending Physician Statement  I  have discussed the care of Min Ackerman including pertinent history and exam findings with the resident. I agree with the assessment, plan and orders as documented by the resident.      BP (!) 179/99   Pulse 74   Wt (!) 147 kg (324 lb)   BMI 46.49 kg/m²    BP Readings from Last 3 Encounters:   02/02/24 (!) 179/99   12/26/23 (!) 176/92   12/08/23 137/84     Wt Readings from Last 3 Encounters:   02/02/24 (!) 147 kg (324 lb)   12/26/23 (!) 146.6 kg (323 lb 3.2 oz)   12/08/23 (!) 142.9 kg (315 lb)          Diagnosis Orders   1. Prediabetes  Basic Metabolic Panel    Microalbumin, Ur      2. Essential hypertension  Basic Metabolic Panel    Microalbumin, Ur      3. Scalp lesion  Karrie Lopez MD, Dermatology, [place]        BP elevated in office- asymptomatic, will add HCTZ to the current medication regimen; ZURDO w/o current CPAP use. Persistently elevated BP likely due to not using CPAP will refer to pulm for sleep study and titration    Scalp lesion- Hx of SCC, lesion appears to be concerning for underlying pathology, possible superimposed bacterial infection Bactroban given- refer to dermatology.       Parish Christopher DO 2/2/2024 10:29 AM

## 2024-02-03 NOTE — PROGRESS NOTES
Min Ackerman (:  1970) is a 53 y.o. male,Established patient, here for evaluation of the following chief complaint(s):  Hypertension         ASSESSMENT/PLAN:  1. Essential hypertension  -     Blood pressure is not controlled.  Untreated ZURDO is contributing to his resistant hypertension.  -Will add HCTZ, increase hydralazine to 100 mg 3 times daily.  Will follow-up in 10 days.  Patient was provided with blood pressure cuff and was instructed to check his average daily.  Patient voiced understanding  -Basic Metabolic Panel; Future  -     Microalbumin, Ur; Future  -     amLODIPine-valsartan-HCTZ 5-160-12.5 MG TABS; Take 1 tablet by mouth Daily, Disp-30 tablet, R-3Normal  -     hydrALAZINE (APRESOLINE) 100 MG tablet; Take 1 tablet by mouth 3 times daily, Disp-90 tablet, R-3Normal  2. Prediabetes  -   A1c 6.2.  Continue metformin  -  Basic Metabolic Panel; Future  -     Microalbumin, Ur; Future  3. Scalp lesion  -    Lesion looks infected.  There is also concern of carcinoma especially with the patient's past medical history of SCC of the skin.  Will add Bactroban and refer to dermatology for further evaluation   mupirocin (BACTROBAN) 2 % ointment; Apply topically 3 times daily., Disp-1 each, R-0, Normal  -     Karrie Lopez MD, Dermatology, [place]  4. Macroalbuminuric diabetic nephropathy (HCC)  -Patient has microalbuminuria likely due to diabetic nephropathy.  Will refer to nephrology for further evaluation     AFL (Epic) - Dave Zamora MD, Nephrology, [place]  5. ZURDO (obstructive sleep apnea)  -Untreated.  No documented sleep study.  Will refer to pulmonology for further evaluation.      Return in about 10 days (around 2024).         Subjective   SUBJECTIVE/OBJECTIVE:  53 years old male patient with past medical history hypertension, type 2 diabetes, ZURDO, squamous cell carcinoma of the skin came to the office for follow-up.  Patient blood pressure is elevated today.  Patient said that he

## 2024-02-07 DIAGNOSIS — M54.41 CHRONIC MIDLINE LOW BACK PAIN WITH RIGHT-SIDED SCIATICA: ICD-10-CM

## 2024-02-07 DIAGNOSIS — G89.29 CHRONIC MIDLINE LOW BACK PAIN WITH RIGHT-SIDED SCIATICA: ICD-10-CM

## 2024-02-07 RX ORDER — GABAPENTIN 300 MG/1
CAPSULE ORAL
Qty: 90 CAPSULE | Refills: 1 | Status: SHIPPED | OUTPATIENT
Start: 2024-02-07 | End: 2024-04-07

## 2024-02-07 RX ORDER — HYDRALAZINE HYDROCHLORIDE 50 MG/1
TABLET, FILM COATED ORAL
Qty: 90 TABLET | Refills: 11 | OUTPATIENT
Start: 2024-02-07

## 2024-02-07 NOTE — TELEPHONE ENCOUNTER
E-scribe request for hydralazine and gabapentin. Please review and e-scribe if applicable.     Last Visit Date:  2/2/24    Next Visit Date:  2/12/2024    Hemoglobin A1C (%)   Date Value   12/08/2023 6.2   08/01/2023 6.5   04/13/2023 6.6             ( goal A1C is < 7)   No components found for: \"LABMICR\"  LDL Cholesterol (mg/dL)   Date Value   07/31/2023 66       (goal LDL is <100)   AST (U/L)   Date Value   04/20/2022 15     ALT (U/L)   Date Value   04/20/2022 13     BUN (mg/dL)   Date Value   02/02/2024 19     BP Readings from Last 3 Encounters:   02/02/24 (!) 179/99   12/26/23 (!) 176/92   12/08/23 137/84          (goal 120/80)        Patient Active Problem List:     Essential hypertension     Chronic back pain     Morbid obesity with BMI of 50.0-59.9, adult (HCC)     ZURDO (obstructive sleep apnea)     Gout     CKD (chronic kidney disease)     Mixed hyperlipidemia     Squamous cell carcinoma of face     Type 2 diabetes mellitus without complication, without long-term current use of insulin (HCC)     Need for prophylactic vaccination and inoculation against varicella     Vitamin D deficiency     BMI 50.0-59.9, adult (HCC)     Type 2 diabetes mellitus with hyperglycemia     Diabetes mellitus (HCC)     Back spasm     Secondary hypertension     Type 2 diabetes mellitus with chronic kidney disease     Body mass index (BMI) 45.0-49.9, adult (HCC)     Primary cutaneous lymphoma (HCC)     Stage 3b chronic kidney disease (HCC)

## 2024-02-20 ENCOUNTER — TELEPHONE (OUTPATIENT)
Dept: FAMILY MEDICINE CLINIC | Age: 54
End: 2024-02-20

## 2024-02-20 NOTE — TELEPHONE ENCOUNTER
Pharmacy is requesting a refill on Alcohol swabs and Calcipotriene 0.005% cream (360) .  Looking into patient's chart I did not see either of these in patient's chart.  Please advise and thank you.

## 2024-02-23 ENCOUNTER — OFFICE VISIT (OUTPATIENT)
Dept: FAMILY MEDICINE CLINIC | Age: 54
End: 2024-02-23

## 2024-02-23 VITALS
WEIGHT: 311 LBS | HEART RATE: 68 BPM | DIASTOLIC BLOOD PRESSURE: 84 MMHG | SYSTOLIC BLOOD PRESSURE: 151 MMHG | BODY MASS INDEX: 44.62 KG/M2

## 2024-02-23 DIAGNOSIS — G47.33 OSA (OBSTRUCTIVE SLEEP APNEA): ICD-10-CM

## 2024-02-23 DIAGNOSIS — I10 ESSENTIAL HYPERTENSION: Primary | ICD-10-CM

## 2024-02-23 RX ORDER — BLOOD SUGAR DIAGNOSTIC
STRIP MISCELLANEOUS
Qty: 100 EACH | Refills: 3 | Status: SHIPPED | OUTPATIENT
Start: 2024-02-23 | End: 2024-02-26 | Stop reason: SDUPTHER

## 2024-02-23 RX ORDER — AMLODIPINE, VALSARTAN AND HYDROCHLOROTHIAZIDE 10; 160; 12.5 MG/1; MG/1; MG/1
TABLET ORAL
Qty: 60 TABLET | Refills: 1 | Status: SHIPPED | OUTPATIENT
Start: 2024-02-23

## 2024-02-23 ASSESSMENT — ENCOUNTER SYMPTOMS
CONSTIPATION: 0
VOMITING: 0
COUGH: 0
WHEEZING: 0
DIARRHEA: 0
SHORTNESS OF BREATH: 0
NAUSEA: 0
ABDOMINAL PAIN: 0

## 2024-02-23 NOTE — PROGRESS NOTES
Min Ackerman (:  1970) is a 53 y.o. male,Established patient, here for evaluation of the following chief complaint(s):  Hypertension (BP check )         ASSESSMENT/PLAN:  1. Essential hypertension  -   Blood pressure still elevated.  Will increase amlodipine to 10 mg and continue other medication without changes.  -Follow-up in 1 month  -  amLODIPine-valsartan-HCTZ -12.5 MG TABS; Take ONE TABLET BY MOUTH DAILY, Disp-60 tablet, R-1Normal  2. ZURDO (obstructive sleep apnea)  -   Patient is high risk for ZURDO.  Will order sleep study and follow-up on that.  He also has an appointment with pulmonology in March.   Baseline Diagnostic Sleep Study; Future  -     Sleep Study with PAP Titration; Future      Return in about 13 weeks (around 2024).         Subjective   SUBJECTIVE/OBJECTIVE:  53 years old male patient with past ministry of recent hypertension, prediabetes, ZURDO came to the office for follow-up.  Blood pressure is still mildly elevated.  Patient is currently on Norvasc 5 mg, HCTZ 12.5, valsartan 160, Coreg and hydralazine 100 mg 3 times daily.  Patient home blood pressure readings are also elevated with max of SBP around 150.  He is asymptomatic and has no complaints today.  Patient is a high risk for sleep apnea.  He was referred to pulmonology last time and he has an appointment in March.  Previously, he said that he is not able to get the sleep study because he cannot lay flat.  But this time he is agreeable to get the sleep study.        Review of Systems   Constitutional:  Negative for diaphoresis and fatigue.   Respiratory:  Negative for cough, shortness of breath and wheezing.    Cardiovascular:  Negative for chest pain, palpitations and leg swelling.   Gastrointestinal:  Negative for abdominal pain, constipation, diarrhea, nausea and vomiting.   Genitourinary:  Negative for flank pain, frequency, hematuria and urgency.   Neurological:  Negative for light-headedness, numbness and

## 2024-02-23 NOTE — PROGRESS NOTES
Attending Physician Statement  I  have discussed the care of Min Ackerman including pertinent history and exam findings with the resident. I agree with the assessment, plan and orders as documented by the resident.      BP (!) 151/84   Pulse 68   Wt (!) 141.1 kg (311 lb)   BMI 44.62 kg/m²    BP Readings from Last 3 Encounters:   02/23/24 (!) 151/84   02/02/24 (!) 179/99   12/26/23 (!) 176/92     Wt Readings from Last 3 Encounters:   02/23/24 (!) 141.1 kg (311 lb)   02/02/24 (!) 147 kg (324 lb)   12/26/23 (!) 146.6 kg (323 lb 3.2 oz)          Diagnosis Orders   1. Essential hypertension  amLODIPine-valsartan-HCTZ -12.5 MG TABS      2. ZURDO (obstructive sleep apnea)  Baseline Diagnostic Sleep Study    Sleep Study with PAP Titration              Parish Christopher DO 2/23/2024 1:47 PM

## 2024-02-26 RX ORDER — BLOOD SUGAR DIAGNOSTIC
STRIP MISCELLANEOUS
Qty: 100 EACH | Refills: 3 | Status: SHIPPED | OUTPATIENT
Start: 2024-02-26

## 2024-02-26 NOTE — TELEPHONE ENCOUNTER
Last visit: 02/23/2024  Last Med refill: 02/23/2024  Does patient have enough medication for 72 hours: No:     Pharmacy is requesting 300 for a 90 day supply for Alcohol pads and  Calcipotriene 0.005% cream (360G) for a 90 day supply.  I did not see cream in patients chart.  Please advise and thank you    Next Visit Date:  Future Appointments   Date Time Provider Department Center   3/19/2024  2:30 PM Uzair Mario MD [facility]   5/24/2024  9:45 AM [facility]       Health Maintenance   Topic Date Due    Diabetic retinal exam  Never done    COVID-19 Vaccine (5 - 2023-24 season) 09/01/2023    Annual Wellness Visit (Medicare Advantage)  01/01/2024    Lipids  07/31/2024    Diabetic foot exam  08/01/2024    A1C test (Diabetic or Prediabetic)  12/08/2024    Diabetic Alb to Cr ratio (uACR) test  02/02/2025    Depression Screen  02/02/2025    GFR test (Diabetes, CKD 3-4, OR last GFR 15-59)  02/02/2025    Colorectal Cancer Screen  04/19/2026    DTaP/Tdap/Td vaccine (4 - Td or Tdap) 07/10/2032    Hepatitis B vaccine  Completed    Flu vaccine  Completed    Shingles vaccine  Completed    Pneumococcal 0-64 years Vaccine  Completed    Hepatitis C screen  Completed    HIV screen  Completed    Hepatitis A vaccine  Aged Out    Hib vaccine  Aged Out    Polio vaccine  Aged Out    Meningococcal (ACWY) vaccine  Aged Out       Hemoglobin A1C (%)   Date Value   12/08/2023 6.2   08/01/2023 6.5   04/13/2023 6.6             ( goal A1C is < 7)   No components found for: \"LABMICR\"  LDL Cholesterol (mg/dL)   Date Value   07/31/2023 66   04/20/2022 127       (goal LDL is <100)   AST (U/L)   Date Value   04/20/2022 15     ALT (U/L)   Date Value   04/20/2022 13     BUN (mg/dL)   Date Value   02/02/2024 19     BP Readings from Last 3 Encounters:   02/23/24 (!) 151/84   02/02/24 (!) 179/99 12/26/23 (!) 176/92          (goal 120/80)    All Future Testing planned in [facility]  Lab Frequency Next Occurrence

## 2024-02-28 NOTE — TELEPHONE ENCOUNTER
Last visit:   Last Med refill:   Does patient have enough medication for 72 hours: No:     Next Visit Date:  Future Appointments   Date Time Provider Department Center   3/19/2024  2:30 PM Uzair Mario MD [facility]   5/24/2024  9:45 AM [facility] MD [facility]       Health Maintenance   Topic Date Due    Diabetic retinal exam  Never done    COVID-19 Vaccine (5 - 2023-24 season) 09/01/2023    Annual Wellness Visit (Medicare Advantage)  01/01/2024    Lipids  07/31/2024    Diabetic foot exam  08/01/2024    A1C test (Diabetic or Prediabetic)  12/08/2024    Diabetic Alb to Cr ratio (uACR) test  02/02/2025    Depression Screen  02/02/2025    GFR test (Diabetes, CKD 3-4, OR last GFR 15-59)  02/02/2025    Colorectal Cancer Screen  04/19/2026    DTaP/Tdap/Td vaccine (4 - Td or Tdap) 07/10/2032    Hepatitis B vaccine  Completed    Flu vaccine  Completed    Shingles vaccine  Completed    Pneumococcal 0-64 years Vaccine  Completed    Hepatitis C screen  Completed    HIV screen  Completed    Hepatitis A vaccine  Aged Out    Hib vaccine  Aged Out    Polio vaccine  Aged Out    Meningococcal (ACWY) vaccine  Aged Out       Hemoglobin A1C (%)   Date Value   12/08/2023 6.2   08/01/2023 6.5   04/13/2023 6.6             ( goal A1C is < 7)   No components found for: \"LABMICR\"  LDL Cholesterol (mg/dL)   Date Value   07/31/2023 66   04/20/2022 127       (goal LDL is <100)   AST (U/L)   Date Value   04/20/2022 15     ALT (U/L)   Date Value   04/20/2022 13     BUN (mg/dL)   Date Value   02/02/2024 19     BP Readings from Last 3 Encounters:   02/23/24 (!) 151/84   02/02/24 (!) 179/99   12/26/23 (!) 176/92          (goal 120/80)    All Future Testing planned in [facility]  Lab Frequency Next Occurrence   Baseline Diagnostic Sleep Study Once 02/23/2024   Sleep Study with PAP Titration Once 02/23/2024               Patient Active Problem List:     Essential hypertension     Chronic back pain     Morbid obesity with BMI

## 2024-02-29 RX ORDER — HYDRALAZINE HYDROCHLORIDE 50 MG/1
TABLET, FILM COATED ORAL
Qty: 90 TABLET | Refills: 1 | OUTPATIENT
Start: 2024-02-29

## 2024-03-07 DIAGNOSIS — I10 ESSENTIAL HYPERTENSION: ICD-10-CM

## 2024-03-07 NOTE — TELEPHONE ENCOUNTER
Last visit: 02/23/2024  Last Med refill: 02/02/2024  Does patient have enough medication for 72 hours: No:       Pharmacy is requesting this to be sent electronically due to being a controlled substance    Next Visit Date:  Future Appointments   Date Time Provider Department Center   3/18/2024  8:00 PM [facility] [place]    3/19/2024  2:30 PM Uzair Mario MD [facility]   5/7/2024  8:45 AM Pj Smith PA-C [facility]   5/24/2024  9:45 AM [facility]       Health Maintenance   Topic Date Due    Diabetic retinal exam  Never done    COVID-19 Vaccine (5 - 2023-24 season) 09/01/2023    Annual Wellness Visit (Medicare Advantage)  01/01/2024    Lipids  07/31/2024    Diabetic foot exam  08/01/2024    A1C test (Diabetic or Prediabetic)  12/08/2024    Diabetic Alb to Cr ratio (uACR) test  02/02/2025    Depression Screen  02/02/2025    GFR test (Diabetes, CKD 3-4, OR last GFR 15-59)  02/02/2025    Colorectal Cancer Screen  04/19/2026    DTaP/Tdap/Td vaccine (4 - Td or Tdap) 07/10/2032    Hepatitis B vaccine  Completed    Flu vaccine  Completed    Shingles vaccine  Completed    Pneumococcal 0-64 years Vaccine  Completed    Hepatitis C screen  Completed    HIV screen  Completed    Hepatitis A vaccine  Aged Out    Hib vaccine  Aged Out    Polio vaccine  Aged Out    Meningococcal (ACWY) vaccine  Aged Out       Hemoglobin A1C (%)   Date Value   12/08/2023 6.2   08/01/2023 6.5   04/13/2023 6.6             ( goal A1C is < 7)   No components found for: \"LABMICR\"  LDL Cholesterol (mg/dL)   Date Value   07/31/2023 66   04/20/2022 127       (goal LDL is <100)   AST (U/L)   Date Value   04/20/2022 15     ALT (U/L)   Date Value   04/20/2022 13     BUN (mg/dL)   Date Value   02/02/2024 19     BP Readings from Last 3 Encounters:   02/23/24 (!) 151/84   02/02/24 (!) 179/99   12/26/23 (!) 176/92          (goal 120/80)    All Future Testing planned in [facility]  Lab Frequency Next

## 2024-03-11 RX ORDER — HYDRALAZINE HYDROCHLORIDE 100 MG/1
100 TABLET, FILM COATED ORAL 3 TIMES DAILY
Qty: 90 TABLET | Refills: 3 | Status: SHIPPED | OUTPATIENT
Start: 2024-03-11

## 2024-04-08 DIAGNOSIS — M54.41 CHRONIC MIDLINE LOW BACK PAIN WITH RIGHT-SIDED SCIATICA: ICD-10-CM

## 2024-04-08 DIAGNOSIS — G89.29 CHRONIC MIDLINE LOW BACK PAIN WITH RIGHT-SIDED SCIATICA: ICD-10-CM

## 2024-04-08 RX ORDER — GABAPENTIN 300 MG/1
CAPSULE ORAL
Qty: 90 CAPSULE | Refills: 0 | Status: SHIPPED | OUTPATIENT
Start: 2024-04-08 | End: 2024-05-08

## 2024-04-08 NOTE — TELEPHONE ENCOUNTER
obesity with BMI of 50.0-59.9, adult (HCC)     ZURDO (obstructive sleep apnea)     Gout     CKD (chronic kidney disease)     Mixed hyperlipidemia     Squamous cell carcinoma of face     Type 2 diabetes mellitus without complication, without long-term current use of insulin (HCC)     Need for prophylactic vaccination and inoculation against varicella     Vitamin D deficiency     BMI 50.0-59.9, adult (HCC)     Type 2 diabetes mellitus with hyperglycemia     Diabetes mellitus (HCC)     Back spasm     Secondary hypertension     Type 2 diabetes mellitus with chronic kidney disease     Body mass index (BMI) 45.0-49.9, adult (HCC)     Primary cutaneous lymphoma (HCC)     Stage 3b chronic kidney disease (HCC)

## 2024-04-26 RX ORDER — ASPIRIN 81 MG/1
81 TABLET, COATED ORAL DAILY
Qty: 30 TABLET | Refills: 5 | Status: SHIPPED | OUTPATIENT
Start: 2024-04-26

## 2024-04-26 NOTE — TELEPHONE ENCOUNTER
E-scribe request for aspirin. Please review and e-scribe if applicable.     Last Visit Date:  2/23/24  Next Visit Date:  5/31/2024    Hemoglobin A1C (%)   Date Value   12/08/2023 6.2   08/01/2023 6.5   04/13/2023 6.6             ( goal A1C is < 7)   No components found for: \"LABMICR\"  LDL Cholesterol (mg/dL)   Date Value   07/31/2023 66       (goal LDL is <100)   AST (U/L)   Date Value   04/20/2022 15     ALT (U/L)   Date Value   04/20/2022 13     BUN (mg/dL)   Date Value   02/02/2024 19     BP Readings from Last 3 Encounters:   02/23/24 (!) 151/84   02/02/24 (!) 179/99   12/26/23 (!) 176/92          (goal 120/80)        Patient Active Problem List:     Essential hypertension     Chronic back pain     Morbid obesity with BMI of 50.0-59.9, adult (HCC)     ZURDO (obstructive sleep apnea)     Gout     CKD (chronic kidney disease)     Mixed hyperlipidemia     Squamous cell carcinoma of face     Type 2 diabetes mellitus without complication, without long-term current use of insulin (HCC)     Need for prophylactic vaccination and inoculation against varicella     Vitamin D deficiency     BMI 50.0-59.9, adult (HCC)     Type 2 diabetes mellitus with hyperglycemia     Diabetes mellitus (HCC)     Back spasm     Secondary hypertension     Type 2 diabetes mellitus with chronic kidney disease     Body mass index (BMI) 45.0-49.9, adult (HCC)     Primary cutaneous lymphoma (HCC)     Stage 3b chronic kidney disease (HCC)

## 2024-05-07 DIAGNOSIS — M54.41 CHRONIC MIDLINE LOW BACK PAIN WITH RIGHT-SIDED SCIATICA: ICD-10-CM

## 2024-05-07 DIAGNOSIS — G89.29 CHRONIC MIDLINE LOW BACK PAIN WITH RIGHT-SIDED SCIATICA: ICD-10-CM

## 2024-05-07 NOTE — TELEPHONE ENCOUNTER
E-scribe request for GABAPENTIN. Please review and e-scribe if applicable.     Last Visit Date:  2/23/2024  Next Visit Date:  5/31/2024    Hemoglobin A1C (%)   Date Value   12/08/2023 6.2   08/01/2023 6.5   04/13/2023 6.6             ( goal A1C is < 7)   No components found for: \"LABMICR\"  No components found for: \"LDLCHOLESTEROL\", \"LDLCALC\"    (goal LDL is <100)   AST (U/L)   Date Value   04/20/2022 15     ALT (U/L)   Date Value   04/20/2022 13     BUN (mg/dL)   Date Value   02/02/2024 19     BP Readings from Last 3 Encounters:   02/23/24 (!) 151/84   02/02/24 (!) 179/99   12/26/23 (!) 176/92          (goal 120/80)        Patient Active Problem List:     Essential hypertension     Chronic back pain     Morbid obesity with BMI of 50.0-59.9, adult (HCC)     ZURDO (obstructive sleep apnea)     Gout     CKD (chronic kidney disease)     Mixed hyperlipidemia     Squamous cell carcinoma of face     Type 2 diabetes mellitus without complication, without long-term current use of insulin (HCC)     Need for prophylactic vaccination and inoculation against varicella     Vitamin D deficiency     BMI 50.0-59.9, adult (HCC)     Type 2 diabetes mellitus with hyperglycemia     Diabetes mellitus (HCC)     Back spasm     Secondary hypertension     Type 2 diabetes mellitus with chronic kidney disease     Body mass index (BMI) 45.0-49.9, adult (HCC)     Primary cutaneous lymphoma (HCC)     Stage 3b chronic kidney disease (HCC)      ----JF

## 2024-05-08 RX ORDER — GABAPENTIN 300 MG/1
CAPSULE ORAL
Qty: 90 CAPSULE | Refills: 0 | Status: SHIPPED | OUTPATIENT
Start: 2024-05-08 | End: 2024-06-07

## 2024-05-20 NOTE — TELEPHONE ENCOUNTER
E-scribe request for ALCOHOL PREP PAD. Please review and e-scribe if applicable.     Last Visit Date:  2/23/2024  Next Visit Date:  5/31/2024    Hemoglobin A1C (%)   Date Value   12/08/2023 6.2   08/01/2023 6.5   04/13/2023 6.6             ( goal A1C is < 7)   No components found for: \"LABMICR\"  No components found for: \"LDLCHOLESTEROL\", \"LDLCALC\"    (goal LDL is <100)   AST (U/L)   Date Value   04/20/2022 15     ALT (U/L)   Date Value   04/20/2022 13     BUN (mg/dL)   Date Value   02/02/2024 19     BP Readings from Last 3 Encounters:   02/23/24 (!) 151/84   02/02/24 (!) 179/99   12/26/23 (!) 176/92          (goal 120/80)        Patient Active Problem List:     Essential hypertension     Chronic back pain     Morbid obesity with BMI of 50.0-59.9, adult (HCC)     ZURDO (obstructive sleep apnea)     Gout     CKD (chronic kidney disease)     Mixed hyperlipidemia     Squamous cell carcinoma of face     Type 2 diabetes mellitus without complication, without long-term current use of insulin (HCC)     Need for prophylactic vaccination and inoculation against varicella     Vitamin D deficiency     BMI 50.0-59.9, adult (HCC)     Type 2 diabetes mellitus with hyperglycemia     Diabetes mellitus (HCC)     Back spasm     Secondary hypertension     Type 2 diabetes mellitus with chronic kidney disease     Body mass index (BMI) 45.0-49.9, adult (HCC)     Primary cutaneous lymphoma (HCC)     Stage 3b chronic kidney disease (HCC)      ----JF

## 2024-05-21 RX ORDER — UBIQUINOL 100 MG
CAPSULE ORAL
Qty: 100 EACH | Refills: 3 | Status: SHIPPED | OUTPATIENT
Start: 2024-05-21

## 2024-05-31 ENCOUNTER — OFFICE VISIT (OUTPATIENT)
Dept: FAMILY MEDICINE CLINIC | Age: 54
End: 2024-05-31
Payer: MEDICARE

## 2024-05-31 VITALS
WEIGHT: 315 LBS | HEART RATE: 69 BPM | SYSTOLIC BLOOD PRESSURE: 138 MMHG | BODY MASS INDEX: 45.1 KG/M2 | HEIGHT: 70 IN | DIASTOLIC BLOOD PRESSURE: 85 MMHG

## 2024-05-31 DIAGNOSIS — G89.29 CHRONIC PAIN OF LEFT KNEE: Primary | ICD-10-CM

## 2024-05-31 DIAGNOSIS — M25.562 CHRONIC PAIN OF LEFT KNEE: Primary | ICD-10-CM

## 2024-05-31 DIAGNOSIS — E11.9 TYPE 2 DIABETES MELLITUS WITHOUT COMPLICATION, WITHOUT LONG-TERM CURRENT USE OF INSULIN (HCC): ICD-10-CM

## 2024-05-31 LAB — HBA1C MFR BLD: 6 %

## 2024-05-31 PROCEDURE — 1036F TOBACCO NON-USER: CPT

## 2024-05-31 PROCEDURE — 3044F HG A1C LEVEL LT 7.0%: CPT

## 2024-05-31 PROCEDURE — 99213 OFFICE O/P EST LOW 20 MIN: CPT

## 2024-05-31 PROCEDURE — 2022F DILAT RTA XM EVC RTNOPTHY: CPT

## 2024-05-31 PROCEDURE — 3075F SYST BP GE 130 - 139MM HG: CPT

## 2024-05-31 PROCEDURE — 83036 HEMOGLOBIN GLYCOSYLATED A1C: CPT

## 2024-05-31 PROCEDURE — 3079F DIAST BP 80-89 MM HG: CPT

## 2024-05-31 PROCEDURE — G8427 DOCREV CUR MEDS BY ELIG CLIN: HCPCS

## 2024-05-31 PROCEDURE — 3017F COLORECTAL CA SCREEN DOC REV: CPT

## 2024-05-31 PROCEDURE — G8417 CALC BMI ABV UP PARAM F/U: HCPCS

## 2024-05-31 RX ORDER — NAPROXEN 500 MG/1
500 TABLET ORAL 2 TIMES DAILY PRN
Qty: 60 TABLET | Refills: 0 | Status: SHIPPED | OUTPATIENT
Start: 2024-05-31 | End: 2024-06-07

## 2024-05-31 SDOH — ECONOMIC STABILITY: HOUSING INSECURITY
IN THE LAST 12 MONTHS, WAS THERE A TIME WHEN YOU DID NOT HAVE A STEADY PLACE TO SLEEP OR SLEPT IN A SHELTER (INCLUDING NOW)?: NO

## 2024-05-31 SDOH — ECONOMIC STABILITY: FOOD INSECURITY: WITHIN THE PAST 12 MONTHS, THE FOOD YOU BOUGHT JUST DIDN'T LAST AND YOU DIDN'T HAVE MONEY TO GET MORE.: NEVER TRUE

## 2024-05-31 SDOH — ECONOMIC STABILITY: FOOD INSECURITY: WITHIN THE PAST 12 MONTHS, YOU WORRIED THAT YOUR FOOD WOULD RUN OUT BEFORE YOU GOT MONEY TO BUY MORE.: NEVER TRUE

## 2024-05-31 SDOH — ECONOMIC STABILITY: INCOME INSECURITY: HOW HARD IS IT FOR YOU TO PAY FOR THE VERY BASICS LIKE FOOD, HOUSING, MEDICAL CARE, AND HEATING?: NOT HARD AT ALL

## 2024-05-31 NOTE — PATIENT INSTRUCTIONS
Thank you for letting us take care of you today. We hope all your questions were addressed. If a question was overlooked or something else comes to mind after you return home, please contact a member of your Care Team listed below.      Your Care Team at [facility] is Team #2  Zoë Fink M.D. (Faculty)  Pili Jordan, (Resident)  Sabino Faria, (Resident)  Gabriela Garcia (Resident)  Sim Martinez, (Resident)  Maria D Dasilva, [facility]  Hamlet Garcia, MARCELINO Alva, [facility]  Terrie Torres, [facility]  Lizzie Polanco,  [facility]  Savana Snow, [facility]  Natividad Hernandez, [facility]  Zeynep Hallman, [facility]  Logan (LJ) Radha MARCELINO (Clinical Practice Manager)  Caro Alvarez [facility] (Clinical Pharmacist)     Office phone number: [phone]    If you need to get in right away due to illness, please be advised we have \"Same Day\" appointments available Monday-Friday. Please call us at [phone] option #3 to schedule your \"Same Day\" appointment.

## 2024-05-31 NOTE — PROGRESS NOTES
Min Ackerman (:  1970) is a 53 y.o. male,Established patient, here for evaluation of the following chief complaint(s):  Hypertension (Follow up on HTN) and Knee Pain (Having left knee pain )      Assessment & Plan   1. Chronic pain of left knee  -    Likely due to osteoarthritis.  Will get an x-ray and follow-up.  -Tylenol and naproxen for pain as needed.  Advised the patient to knee exercises.  Patient voiced understanding.  If x-ray shows osteoarthritis, will consider knee injection if symptoms persist.   XR KNEE LEFT (1-2 VIEWS); Future  -     naproxen (NAPROSYN) 500 MG tablet; Take 1 tablet by mouth 2 times daily as needed for Pain, Disp-60 tablet, R-0Normal  2. Type 2 diabetes mellitus without complication, without long-term current use of insulin ([facility])  -     POCT glycosylated hemoglobin (Hb A1C)      Return in about 6 weeks (around 2024) for knee pain.       Subjective   53 years old male patient with past medical Struve hypertension, diabetes came to the office for follow-up.  Patient is complaining of left knee pain that has been going for several months but got worse recently.  Denies any trauma or injury.  Pain increases with ambulation.        Review of Systems   Constitutional:  Negative for diaphoresis, fatigue and fever.   Respiratory:  Negative for cough, shortness of breath and wheezing.    Cardiovascular:  Negative for chest pain, palpitations and leg swelling.   Gastrointestinal:  Negative for abdominal pain, constipation, diarrhea, nausea and vomiting.   Musculoskeletal:         Left knee pain   Neurological:  Negative for dizziness, weakness, light-headedness, numbness and headaches.          Objective   Physical Exam  Constitutional:       General: He is not in acute distress.     Appearance: Normal appearance.   HENT:      Head: Normocephalic and atraumatic.   Cardiovascular:      Rate and Rhythm: Normal rate and regular rhythm.      Pulses: Normal pulses.      Heart 
Attending Physician Statement  I  have discussed the care of Min Ackerman including pertinent history and exam findings with the resident. I agree with the assessment, plan and orders as documented by the resident.      /85 (Site: Right Upper Arm, Position: Sitting, Cuff Size: Large Adult)   Pulse 69   Ht 1.778 m (5' 10\")   Wt (!) 146.8 kg (323 lb 9.6 oz)   BMI 46.43 kg/m²    BP Readings from Last 3 Encounters:   05/31/24 138/85   02/23/24 (!) 151/84   02/02/24 (!) 179/99     Wt Readings from Last 3 Encounters:   05/31/24 (!) 146.8 kg (323 lb 9.6 oz)   02/23/24 (!) 141.1 kg (311 lb)   02/02/24 (!) 147 kg (324 lb)     Knee pain addressed      Diagnosis Orders   1. Chronic pain of left knee  XR KNEE LEFT (1-2 VIEWS)    naproxen (NAPROSYN) 500 MG tablet      2. Type 2 diabetes mellitus without complication, without long-term current use of insulin ([facility])  POCT glycosylated hemoglobin (Hb A1C)              Zoë Fink MD 6/3/2024 8:43 AM      
Completed    Flu vaccine  Completed    Shingles vaccine  Completed    Pneumococcal 0-64 years Vaccine  Completed    Hepatitis C screen  Completed    HIV screen  Completed    Hepatitis A vaccine  Aged Out    Hib vaccine  Aged Out    Polio vaccine  Aged Out    Meningococcal (ACWY) vaccine  Aged Out

## 2024-06-03 NOTE — TELEPHONE ENCOUNTER
Refill Request of 81 mg aspirin received from patient, Pharmacy confirmed. Medication Pended. Pt last seen on 5/31/24, Next appt is unscheduled.    Health Maintenance   Topic Date Due    Diabetic retinal exam  Never done    COVID-19 Vaccine (5 - 2023-24 season) 09/01/2023    Annual Wellness Visit (Medicare Advantage)  01/01/2024    Lipids  07/31/2024    Diabetic foot exam  08/01/2024    Diabetic Alb to Cr ratio (uACR) test  02/02/2025    Depression Screen  02/02/2025    GFR test (Diabetes, CKD 3-4, OR last GFR 15-59)  02/02/2025    A1C test (Diabetic or Prediabetic)  05/31/2025    Colorectal Cancer Screen  04/19/2026    DTaP/Tdap/Td vaccine (4 - Td or Tdap) 07/10/2032    Hepatitis B vaccine  Completed    Flu vaccine  Completed    Shingles vaccine  Completed    Pneumococcal 0-64 years Vaccine  Completed    Hepatitis C screen  Completed    HIV screen  Completed    Hepatitis A vaccine  Aged Out    Hib vaccine  Aged Out    Polio vaccine  Aged Out    Meningococcal (ACWY) vaccine  Aged Out       Hemoglobin A1C (%)   Date Value   05/31/2024 6.0   12/08/2023 6.2   08/01/2023 6.5             ( goal A1C is < 7)   No components found for: \"LABMICR\"  No components found for: \"LDLCHOLESTEROL\", \"LDLCALC\"    (goal LDL is <100)   AST (U/L)   Date Value   04/20/2022 15     ALT (U/L)   Date Value   04/20/2022 13     BUN (mg/dL)   Date Value   02/02/2024 19     BP Readings from Last 3 Encounters:   05/31/24 138/85   02/23/24 (!) 151/84   02/02/24 (!) 179/99          (goal 120/80)    All Future Testing planned in [facility]  Lab Frequency Next Occurrence   Baseline Diagnostic Sleep Study Once 02/23/2024   Sleep Study with PAP Titration Once 02/23/2024   XR KNEE LEFT (1-2 VIEWS) Once 05/31/2024       Next Visit Date:  No future appointments.         Patient Active Problem List:     Essential hypertension     Chronic back pain     Morbid obesity with BMI of 50.0-59.9, adult (HCC)     ZURDO (obstructive sleep apnea)     Gout     CKD (chronic

## 2024-06-04 RX ORDER — ASPIRIN 81 MG/1
81 TABLET ORAL DAILY
Qty: 30 TABLET | Refills: 5 | Status: SHIPPED | OUTPATIENT
Start: 2024-06-04

## 2024-06-06 DIAGNOSIS — M54.41 CHRONIC MIDLINE LOW BACK PAIN WITH RIGHT-SIDED SCIATICA: ICD-10-CM

## 2024-06-06 DIAGNOSIS — I10 ESSENTIAL HYPERTENSION: ICD-10-CM

## 2024-06-06 DIAGNOSIS — G89.29 CHRONIC PAIN OF LEFT KNEE: ICD-10-CM

## 2024-06-06 DIAGNOSIS — G89.29 CHRONIC MIDLINE LOW BACK PAIN WITH RIGHT-SIDED SCIATICA: ICD-10-CM

## 2024-06-06 DIAGNOSIS — M25.562 CHRONIC PAIN OF LEFT KNEE: ICD-10-CM

## 2024-06-07 RX ORDER — NAPROXEN 500 MG/1
TABLET ORAL
Qty: 60 TABLET | Refills: 0 | Status: SHIPPED | OUTPATIENT
Start: 2024-06-07

## 2024-06-07 RX ORDER — AMLODIPINE BESYLATE VALSARTAN HYDROCHLOROTHIAZIDE 10; 160; 12.5 MG/1; MG/1; MG/1
TABLET, FILM COATED ORAL
Qty: 60 TABLET | Refills: 0 | Status: SHIPPED | OUTPATIENT
Start: 2024-06-07

## 2024-06-07 RX ORDER — GABAPENTIN 300 MG/1
CAPSULE ORAL
Qty: 90 CAPSULE | Refills: 0 | OUTPATIENT
Start: 2024-06-07

## 2024-06-07 NOTE — TELEPHONE ENCOUNTER
Please call patient to schedule an appointment.  
Morbid obesity with BMI of 50.0-59.9, adult (HCC)     ZURDO (obstructive sleep apnea)     Gout     CKD (chronic kidney disease)     Mixed hyperlipidemia     Squamous cell carcinoma of face     Type 2 diabetes mellitus without complication, without long-term current use of insulin (HCC)     Need for prophylactic vaccination and inoculation against varicella     Vitamin D deficiency     BMI 50.0-59.9, adult (HCC)     Type 2 diabetes mellitus with hyperglycemia     Diabetes mellitus (HCC)     Back spasm     Secondary hypertension     Type 2 diabetes mellitus with chronic kidney disease     Body mass index (BMI) 45.0-49.9, adult (HCC)     Primary cutaneous lymphoma (HCC)     Stage 3b chronic kidney disease (HCC)

## 2024-06-27 DIAGNOSIS — G89.29 CHRONIC MIDLINE LOW BACK PAIN WITH RIGHT-SIDED SCIATICA: ICD-10-CM

## 2024-06-27 DIAGNOSIS — M54.41 CHRONIC MIDLINE LOW BACK PAIN WITH RIGHT-SIDED SCIATICA: ICD-10-CM

## 2024-06-27 NOTE — TELEPHONE ENCOUNTER
E-scribe request for gabapentin. Please review and e-scribe if applicable.     Last Visit Date:  5/31/24  Next Visit Date:  Visit date not found    Hemoglobin A1C (%)   Date Value   05/31/2024 6.0   12/08/2023 6.2   08/01/2023 6.5             ( goal A1C is < 7)   No components found for: \"LABMICR\"  No components found for: \"LDLCHOLESTEROL\", \"LDLCALC\"    (goal LDL is <100)   AST (U/L)   Date Value   04/20/2022 15     ALT (U/L)   Date Value   04/20/2022 13     BUN (mg/dL)   Date Value   02/02/2024 19     BP Readings from Last 3 Encounters:   05/31/24 138/85   02/23/24 (!) 151/84   02/02/24 (!) 179/99          (goal 120/80)        Patient Active Problem List:     Essential hypertension     Chronic back pain     Morbid obesity with BMI of 50.0-59.9, adult (HCC)     ZURDO (obstructive sleep apnea)     Gout     CKD (chronic kidney disease)     Mixed hyperlipidemia     Squamous cell carcinoma of face     Type 2 diabetes mellitus without complication, without long-term current use of insulin (HCC)     Need for prophylactic vaccination and inoculation against varicella     Vitamin D deficiency     BMI 50.0-59.9, adult (HCC)     Type 2 diabetes mellitus with hyperglycemia     Diabetes mellitus (HCC)     Back spasm     Secondary hypertension     Type 2 diabetes mellitus with chronic kidney disease     Body mass index (BMI) 45.0-49.9, adult (HCC)     Primary cutaneous lymphoma (HCC)     Stage 3b chronic kidney disease (HCC)

## 2024-06-28 RX ORDER — GABAPENTIN 300 MG/1
CAPSULE ORAL
Qty: 90 CAPSULE | Refills: 0 | Status: SHIPPED | OUTPATIENT
Start: 2024-06-28 | End: 2024-07-28

## 2024-07-07 DIAGNOSIS — G89.29 CHRONIC PAIN OF LEFT KNEE: ICD-10-CM

## 2024-07-07 DIAGNOSIS — M25.562 CHRONIC PAIN OF LEFT KNEE: ICD-10-CM

## 2024-07-07 DIAGNOSIS — G89.29 CHRONIC MIDLINE LOW BACK PAIN WITH RIGHT-SIDED SCIATICA: ICD-10-CM

## 2024-07-07 DIAGNOSIS — I10 ESSENTIAL HYPERTENSION: ICD-10-CM

## 2024-07-07 DIAGNOSIS — M54.41 CHRONIC MIDLINE LOW BACK PAIN WITH RIGHT-SIDED SCIATICA: ICD-10-CM

## 2024-07-08 RX ORDER — HYDRALAZINE HYDROCHLORIDE 100 MG/1
TABLET, FILM COATED ORAL
Qty: 90 TABLET | Refills: 11 | Status: SHIPPED | OUTPATIENT
Start: 2024-07-08

## 2024-07-08 RX ORDER — NAPROXEN 500 MG/1
TABLET ORAL
Qty: 60 TABLET | Refills: 11 | OUTPATIENT
Start: 2024-07-08

## 2024-07-08 RX ORDER — GABAPENTIN 300 MG/1
CAPSULE ORAL
Qty: 90 CAPSULE | Refills: 11 | OUTPATIENT
Start: 2024-07-08

## 2024-07-08 NOTE — TELEPHONE ENCOUNTER
PCP NO LONGER IN OFFICE, ROUTING TO PRIOR CARE TEAM    E-scribe request for PENDED MEDICATIONS. Please review and e-scribe if applicable.     Last Visit Date:  5/31/24  Next Visit Date:  7/7/2024    Hemoglobin A1C (%)   Date Value   05/31/2024 6.0   12/08/2023 6.2   08/01/2023 6.5             ( goal A1C is < 7)   No components found for: \"LABMICR\"  No components found for: \"LDLCHOLESTEROL\", \"LDLCALC\"    (goal LDL is <100)   AST (U/L)   Date Value   04/20/2022 15     ALT (U/L)   Date Value   04/20/2022 13     BUN (mg/dL)   Date Value   02/02/2024 19     BP Readings from Last 3 Encounters:   05/31/24 138/85   02/23/24 (!) 151/84   02/02/24 (!) 179/99          (goal 120/80)        Patient Active Problem List:     Essential hypertension     Chronic back pain     Morbid obesity with BMI of 50.0-59.9, adult (HCC)     ZURDO (obstructive sleep apnea)     Gout     CKD (chronic kidney disease)     Mixed hyperlipidemia     Squamous cell carcinoma of face     Type 2 diabetes mellitus without complication, without long-term current use of insulin (HCC)     Need for prophylactic vaccination and inoculation against varicella     Vitamin D deficiency     BMI 50.0-59.9, adult (HCC)     Type 2 diabetes mellitus with hyperglycemia     Diabetes mellitus (HCC)     Back spasm     Secondary hypertension     Type 2 diabetes mellitus with chronic kidney disease     Body mass index (BMI) 45.0-49.9, adult (HCC)     Primary cutaneous lymphoma (HCC)     Stage 3b chronic kidney disease (HCC)      ----JF

## 2024-07-08 NOTE — TELEPHONE ENCOUNTER
PCP NO LONGER IN OFFICE, ROUTING TO PRIOR CARE TEAM    E-scribe request for HYDRALAZINE. Please review and e-scribe if applicable.     Last Visit Date:  5/31/24  Next Visit Date:  Visit date not found    Hemoglobin A1C (%)   Date Value   05/31/2024 6.0   12/08/2023 6.2   08/01/2023 6.5             ( goal A1C is < 7)   No components found for: \"LABMICR\"  No components found for: \"LDLCHOLESTEROL\", \"LDLCALC\"    (goal LDL is <100)   AST (U/L)   Date Value   04/20/2022 15     ALT (U/L)   Date Value   04/20/2022 13     BUN (mg/dL)   Date Value   02/02/2024 19     BP Readings from Last 3 Encounters:   05/31/24 138/85   02/23/24 (!) 151/84   02/02/24 (!) 179/99          (goal 120/80)        Patient Active Problem List:     Essential hypertension     Chronic back pain     Morbid obesity with BMI of 50.0-59.9, adult (HCC)     ZURDO (obstructive sleep apnea)     Gout     CKD (chronic kidney disease)     Mixed hyperlipidemia     Squamous cell carcinoma of face     Type 2 diabetes mellitus without complication, without long-term current use of insulin (HCC)     Need for prophylactic vaccination and inoculation against varicella     Vitamin D deficiency     BMI 50.0-59.9, adult (HCC)     Type 2 diabetes mellitus with hyperglycemia     Diabetes mellitus (HCC)     Back spasm     Secondary hypertension     Type 2 diabetes mellitus with chronic kidney disease     Body mass index (BMI) 45.0-49.9, adult (HCC)     Primary cutaneous lymphoma (HCC)     Stage 3b chronic kidney disease (HCC)      ----JF

## 2024-07-22 RX ORDER — UBIQUINOL 100 MG
CAPSULE ORAL
Qty: 100 EACH | Refills: 0 | Status: SHIPPED | OUTPATIENT
Start: 2024-07-22

## 2024-07-22 NOTE — TELEPHONE ENCOUNTER
Last visit: 5/31/24  Last Med refill: 5/21/24  Does patient have enough medication for 72 hours: no    Next Visit Date:  No future appointments.    Health Maintenance   Topic Date Due    Diabetic retinal exam  Never done    COVID-19 Vaccine (5 - 2023-24 season) 09/01/2023    Annual Wellness Visit (Medicare Advantage)  01/01/2024    Lipids  07/31/2024    Diabetic foot exam  08/01/2024    Flu vaccine (1) 08/01/2024    Diabetic Alb to Cr ratio (uACR) test  02/02/2025    Depression Screen  02/02/2025    GFR test (Diabetes, CKD 3-4, OR last GFR 15-59)  02/02/2025    A1C test (Diabetic or Prediabetic)  05/31/2025    Colorectal Cancer Screen  04/19/2026    DTaP/Tdap/Td vaccine (4 - Td or Tdap) 07/10/2032    Hepatitis B vaccine  Completed    Shingles vaccine  Completed    Pneumococcal 0-64 years Vaccine  Completed    Hepatitis C screen  Completed    HIV screen  Completed    Hepatitis A vaccine  Aged Out    Hib vaccine  Aged Out    Polio vaccine  Aged Out    Meningococcal (ACWY) vaccine  Aged Out       Hemoglobin A1C (%)   Date Value   05/31/2024 6.0   12/08/2023 6.2   08/01/2023 6.5             ( goal A1C is < 7)   No components found for: \"LABMICR\"  No components found for: \"LDLCHOLESTEROL\", \"LDLCALC\"    (goal LDL is <100)   AST (U/L)   Date Value   04/20/2022 15     ALT (U/L)   Date Value   04/20/2022 13     BUN (mg/dL)   Date Value   02/02/2024 19     BP Readings from Last 3 Encounters:   05/31/24 138/85   02/23/24 (!) 151/84   02/02/24 (!) 179/99          (goal 120/80)    All Future Testing planned in [facility]  Lab Frequency Next Occurrence   Baseline Diagnostic Sleep Study Once 02/23/2024   Sleep Study with PAP Titration Once 02/23/2024   XR KNEE LEFT (1-2 VIEWS) Once 05/31/2024               Patient Active Problem List:     Essential hypertension     Chronic back pain     Morbid obesity with BMI of 50.0-59.9, adult (HCC)     ZURDO (obstructive sleep apnea)     Gout     CKD (chronic kidney disease)     Mixed

## 2024-09-05 DIAGNOSIS — I10 ESSENTIAL HYPERTENSION: ICD-10-CM

## 2024-09-05 RX ORDER — ATORVASTATIN CALCIUM 40 MG/1
TABLET, FILM COATED ORAL
Qty: 30 TABLET | Refills: 11 | Status: SHIPPED | OUTPATIENT
Start: 2024-09-05

## 2024-09-05 NOTE — TELEPHONE ENCOUNTER
Last visit: 5/31/24  Last Med refill: 9/8/23  Does patient have enough medication for 72 hours: no    Next Visit Date:9/27/24  Future Appointments   Date Time Provider Department Center   9/27/2024  2:10 PM Elmer Whaley MD [facility] E[facility] DEP       Health Maintenance   Topic Date Due    Diabetic retinal exam  Never done    Annual Wellness Visit (Medicare Advantage)  01/01/2024    Lipids  07/31/2024    Diabetic foot exam  08/01/2024    Flu vaccine (1) 08/01/2024    COVID-19 Vaccine (5 - 2023-24 season) 09/01/2024    Diabetic Alb to Cr ratio (uACR) test  02/02/2025    Depression Screen  02/02/2025    GFR test (Diabetes, CKD 3-4, OR last GFR 15-59)  02/02/2025    A1C test (Diabetic or Prediabetic)  05/31/2025    Colorectal Cancer Screen  04/19/2026    DTaP/Tdap/Td vaccine (4 - Td or Tdap) 07/10/2032    Hepatitis B vaccine  Completed    Shingles vaccine  Completed    Pneumococcal 0-64 years Vaccine  Completed    Hepatitis C screen  Completed    HIV screen  Completed    Hepatitis A vaccine  Aged Out    Hib vaccine  Aged Out    Polio vaccine  Aged Out    Meningococcal (ACWY) vaccine  Aged Out       Hemoglobin A1C (%)   Date Value   05/31/2024 6.0   12/08/2023 6.2   08/01/2023 6.5             ( goal A1C is < 7)   No components found for: \"LABMICR\"  No components found for: \"LDLCHOLESTEROL\", \"LDLCALC\"    (goal LDL is <100)   AST (U/L)   Date Value   04/20/2022 15     ALT (U/L)   Date Value   04/20/2022 13     BUN (mg/dL)   Date Value   02/02/2024 19     BP Readings from Last 3 Encounters:   05/31/24 138/85   02/23/24 (!) 151/84   02/02/24 (!) 179/99          (goal 120/80)    All Future Testing planned in [facility]  Lab Frequency Next Occurrence   XR KNEE LEFT (1-2 VIEWS) Once 05/31/2024               Patient Active Problem List:     Essential hypertension     Chronic back pain     Morbid obesity with BMI of 50.0-59.9, adult (HCC)     ZURDO (obstructive sleep apnea)     Gout     CKD (chronic kidney disease)     Mixed

## 2024-09-25 DIAGNOSIS — I10 ESSENTIAL HYPERTENSION: ICD-10-CM

## 2024-09-27 RX ORDER — AMLODIPINE BESYLATE VALSARTAN HYDROCHLOROTHIAZIDE 10; 160; 12.5 MG/1; MG/1; MG/1
TABLET, FILM COATED ORAL
Qty: 60 TABLET | Refills: 0 | Status: SHIPPED | OUTPATIENT
Start: 2024-09-27

## 2024-10-07 DIAGNOSIS — I10 ESSENTIAL HYPERTENSION: ICD-10-CM

## 2024-10-07 RX ORDER — CARVEDILOL 25 MG/1
25 TABLET ORAL 2 TIMES DAILY WITH MEALS
Qty: 60 TABLET | Refills: 11 | Status: SHIPPED | OUTPATIENT
Start: 2024-10-07

## 2024-10-07 NOTE — TELEPHONE ENCOUNTER
Patient last seen Dr. Rosales         Please address the medication refill and close the encounter.  If I can be of assistance, please route to the applicable pool.      Thank you.      Last visit: 5-31-24  Last Med refill: 10-10-23  Does patient have enough medication for 72 hours: No:     Next Visit Date:  Future Appointments   Date Time Provider Department Center   10/11/2024  8:40 AM Elmer Whaley MD [facility] DEP       Health Maintenance   Topic Date Due    Diabetic retinal exam  Never done    Annual Wellness Visit (Medicare Advantage)  01/01/2024    Lipids  07/31/2024    Diabetic foot exam  08/01/2024    Flu vaccine (1) 08/01/2024    COVID-19 Vaccine (5 - 2023-24 season) 09/01/2024    Diabetic Alb to Cr ratio (uACR) test  02/02/2025    Depression Screen  02/02/2025    GFR test (Diabetes, CKD 3-4, OR last GFR 15-59)  02/02/2025    A1C test (Diabetic or Prediabetic)  05/31/2025    Colorectal Cancer Screen  04/19/2026    DTaP/Tdap/Td vaccine (4 - Td or Tdap) 07/10/2032    Hepatitis B vaccine  Completed    Shingles vaccine  Completed    Pneumococcal 0-64 years Vaccine  Completed    Hepatitis C screen  Completed    HIV screen  Completed    Hepatitis A vaccine  Aged Out    Hib vaccine  Aged Out    Polio vaccine  Aged Out    Meningococcal (ACWY) vaccine  Aged Out       Hemoglobin A1C (%)   Date Value   05/31/2024 6.0   12/08/2023 6.2   08/01/2023 6.5             ( goal A1C is < 7)   No components found for: \"LABMICR\"  No components found for: \"LDLCHOLESTEROL\", \"LDLCALC\"    (goal LDL is <100)   AST (U/L)   Date Value   04/20/2022 15     ALT (U/L)   Date Value   04/20/2022 13     BUN (mg/dL)   Date Value   02/02/2024 19     BP Readings from Last 3 Encounters:   05/31/24 138/85   02/23/24 (!) 151/84   02/02/24 (!) 179/99          (goal 120/80)    All Future Testing planned in [facility]  Lab Frequency Next Occurrence   XR KNEE LEFT (1-2 VIEWS) Once 05/31/2024               Patient Active Problem List:

## 2024-10-11 ENCOUNTER — OFFICE VISIT (OUTPATIENT)
Dept: FAMILY MEDICINE CLINIC | Age: 54
End: 2024-10-11
Payer: MEDICARE

## 2024-10-11 VITALS
HEART RATE: 69 BPM | OXYGEN SATURATION: 96 % | TEMPERATURE: 97.3 F | WEIGHT: 315 LBS | BODY MASS INDEX: 45.1 KG/M2 | SYSTOLIC BLOOD PRESSURE: 148 MMHG | HEIGHT: 70 IN | DIASTOLIC BLOOD PRESSURE: 86 MMHG

## 2024-10-11 DIAGNOSIS — I10 ESSENTIAL HYPERTENSION: Primary | ICD-10-CM

## 2024-10-11 DIAGNOSIS — E11.9 TYPE 2 DIABETES MELLITUS WITHOUT COMPLICATION, WITHOUT LONG-TERM CURRENT USE OF INSULIN (HCC): ICD-10-CM

## 2024-10-11 DIAGNOSIS — Z23 NEEDS FLU SHOT: ICD-10-CM

## 2024-10-11 DIAGNOSIS — Z13.220 SCREENING FOR HYPERLIPIDEMIA: ICD-10-CM

## 2024-10-11 LAB — HBA1C MFR BLD: 6.2 %

## 2024-10-11 PROCEDURE — 83036 HEMOGLOBIN GLYCOSYLATED A1C: CPT

## 2024-10-11 PROCEDURE — 2022F DILAT RTA XM EVC RTNOPTHY: CPT

## 2024-10-11 PROCEDURE — 90656 IIV3 VACC NO PRSV 0.5 ML IM: CPT

## 2024-10-11 PROCEDURE — 3079F DIAST BP 80-89 MM HG: CPT

## 2024-10-11 PROCEDURE — G8427 DOCREV CUR MEDS BY ELIG CLIN: HCPCS

## 2024-10-11 PROCEDURE — 3077F SYST BP >= 140 MM HG: CPT

## 2024-10-11 PROCEDURE — 3017F COLORECTAL CA SCREEN DOC REV: CPT

## 2024-10-11 PROCEDURE — G8482 FLU IMMUNIZE ORDER/ADMIN: HCPCS

## 2024-10-11 PROCEDURE — G8417 CALC BMI ABV UP PARAM F/U: HCPCS

## 2024-10-11 PROCEDURE — 99211 OFF/OP EST MAY X REQ PHY/QHP: CPT | Performed by: FAMILY MEDICINE

## 2024-10-11 PROCEDURE — 3044F HG A1C LEVEL LT 7.0%: CPT

## 2024-10-11 PROCEDURE — 99213 OFFICE O/P EST LOW 20 MIN: CPT

## 2024-10-11 PROCEDURE — 1036F TOBACCO NON-USER: CPT

## 2024-10-11 RX ORDER — AMLODIPINE BESYLATE VALSARTAN HYDROCHLOROTHIAZIDE 10; 12.5; 16 MG/1; MG/1; MG/1
TABLET, FILM COATED ORAL
Qty: 60 TABLET | Refills: 0 | Status: SHIPPED | OUTPATIENT
Start: 2024-10-11

## 2024-10-11 ASSESSMENT — PATIENT HEALTH QUESTIONNAIRE - PHQ9
SUM OF ALL RESPONSES TO PHQ9 QUESTIONS 1 & 2: 0
SUM OF ALL RESPONSES TO PHQ QUESTIONS 1-9: 0
SUM OF ALL RESPONSES TO PHQ QUESTIONS 1-9: 0
2. FEELING DOWN, DEPRESSED OR HOPELESS: NOT AT ALL
SUM OF ALL RESPONSES TO PHQ QUESTIONS 1-9: 0
1. LITTLE INTEREST OR PLEASURE IN DOING THINGS: NOT AT ALL
SUM OF ALL RESPONSES TO PHQ QUESTIONS 1-9: 0

## 2024-10-11 NOTE — PATIENT INSTRUCTIONS
Thank you for letting us take care of you today. We hope all your questions were addressed. If a question was overlooked or something else comes to mind after you return home, please contact a member of your Care Team listed below.        Your Care Team at [facility] is Team #4  Parish Christopher M.D. (Faculty)  Ba Kumari M.D. (Resident)  Elmer Whaley M.D.  (Resident)  Shabana Abreu M.D. (Resident)  Gerard Hall M.D. (Resident)  Hamlet Garcia, MARCELINO Alva, MARCELINO Torres, [facility]  Zeynep Hallman, [facility]  Savana Snow, [facility]  Lizzie Polanco, MARCELINO Hernandez, MARCELINO Ford (LJ) MEDARDO Garcia (Clinical Practice Manager)  Caro Alvarez [facility] (Clinical Pharmacist)       Office phone number: [phone]    If you need to get in right away due to illness, please be advised we have \"Same Day\" appointments available Monday-Friday. Please call us at [phone] option #3 to schedule your \"Same Day\" appointment.

## 2024-10-11 NOTE — PROGRESS NOTES
Attending Physician Statement  I have discussed the care of Min Ackerman, 53 y.o. male,including pertinent history and exam findings,  with the resident Elmer Grace MD.  History:  Chief Complaint   Patient presents with    Hypertension     Please discuss medication     Diabetes    Health Maintenance     No dm eye exam        I have reviewed the key elements of the encounter with the resident. Examination was done by resident as documented in residents note.    BP Readings from Last 3 Encounters:   10/11/24 (!) 148/86   05/31/24 138/85   02/23/24 (!) 151/84     BP (!) 148/86 (Site: Right Upper Arm, Position: Sitting, Cuff Size: Large Adult)   Pulse 69   Temp 97.3 °F (36.3 °C) (Oral)   Ht 1.778 m (5' 10\")   Wt (!) 148.5 kg (327 lb 6.4 oz)   SpO2 96%   BMI 46.98 kg/m²   Lab Results   Component Value Date    WBC 9.2 01/23/2018    HGB 11.7 (L) 01/23/2018    HCT 38.5 (L) 01/23/2018     01/23/2018    CHOL 128 07/31/2023    TRIG 142 07/31/2023    HDL 34 (L) 07/31/2023    ALT 13 04/20/2022    AST 15 04/20/2022     02/02/2024    K 5.1 02/02/2024     02/02/2024    CREATININE 1.1 02/02/2024    BUN 19 02/02/2024    CO2 27 02/02/2024    TSH 1.57 05/25/2017    LABA1C 6.2 10/11/2024     Lab Results   Component Value Date    CALCIUM 9.7 02/02/2024    PHOS 3.1 06/06/2017     No results found for: \"LDLDIRECT\"  I agree with the assessment, plan and diagnosis of    Diagnosis Orders   1. Essential hypertension  amLODIPine-valsartan-HCTZ -12.5 MG [facility] Primary [facility] Pharmacist      2. Screening for hyperlipidemia  Lipid Panel      3. Type 2 diabetes mellitus without complication, without long-term current use of insulin (HCC)  POCT glycosylated hemoglobin (Hb A1C)    Arvind Mahmood MD, Ophthalmology, [facility]      4. Needs flu shot  Influenza, AFLURIA Trivalent, (age 3 y+), IM, Preservative Free, 0.5mL        I agree with orders as documented 
DIABETES and HYPERTENSION visit    BP Readings from Last 3 Encounters:   05/31/24 138/85   02/23/24 (!) 151/84   02/02/24 (!) 179/99        Hemoglobin A1C (%)   Date Value   05/31/2024 6.0   12/08/2023 6.2   08/01/2023 6.5     HDL (mg/dL)   Date Value   07/31/2023 34 (L)     BUN (mg/dL)   Date Value   02/02/2024 19     Creatinine (mg/dL)   Date Value   02/02/2024 1.1     Glucose (mg/dL)   Date Value   02/02/2024 145 (H)   06/06/2017 222 (H)            Have you changed or started any medications since your last visit including any over-the-counter medicines, vitamins, or herbal medicines? no   Have you stopped taking any of your medications? Is so, why? -  no  Are you having any side effects from any of your medications? - no    Have you seen any other physician or provider since your last visit?  no   Have you had any other diagnostic tests since your last visit?  no   Have you been seen in the emergency room and/or had an admission in a hospital since we last saw you?  no   Have you had your routine dental cleaning in the past 6 months?  no     Have you had your annual diabetic retinal (eye) exam? No   (ensure copy of exam is in the chart)    Do you have an active [employer] account? If no, what is the barrier?  Yes    Patient Care Team:  Elmer Whaley MD as PCP - General (Family Medicine)  aB Barkley DO as PCP - Empaneled Provider  Uzair Mario MD as Consulting Physician (Pulmonary Disease)    Medical History Review  Past Medical, Family, and Social History reviewed and does not contribute to the patient presenting condition    Health Maintenance   Topic Date Due    Diabetic retinal exam  Never done    Annual Wellness Visit (Medicare Advantage)  01/01/2024    Lipids  07/31/2024    Diabetic foot exam  08/01/2024    Flu vaccine (1) 08/01/2024    COVID-19 Vaccine (5 - 2023-24 season) 09/01/2024    Diabetic Alb to Cr ratio (uACR) test  02/02/2025    GFR test (Diabetes, CKD 3-4, OR last GFR 15-59)  
Systems   Constitutional:  Negative for diaphoresis, fatigue and fever.   Respiratory:  Negative for cough, shortness of breath and wheezing.    Cardiovascular:  Negative for chest pain, palpitations and leg swelling.   Gastrointestinal:  Negative for abdominal pain, constipation, diarrhea, nausea and vomiting.   Neurological:  Negative for dizziness, weakness, light-headedness, numbness and headaches.          Objective   Physical Exam  Constitutional:       General: He is not in acute distress.     Appearance: Normal appearance.   HENT:      Head: Normocephalic and atraumatic.   Cardiovascular:      Rate and Rhythm: Normal rate and regular rhythm.      Pulses: Normal pulses.      Heart sounds: Normal heart sounds. No murmur heard.  Pulmonary:      Effort: Pulmonary effort is normal.      Breath sounds: Normal breath sounds. No wheezing, rhonchi or rales.   Abdominal:      General: Abdomen is flat. Bowel sounds are normal.      Palpations: Abdomen is soft.   Musculoskeletal:         General: No swelling or tenderness.      Right lower leg: No edema.      Left lower leg: No edema.   Neurological:      Mental Status: He is alert.            On this date 10/11/2024 I have spent 25 minutes reviewing previous notes, test results and face to face with the patient discussing the diagnosis and importance of compliance with the treatment plan as well as documenting on the day of the visit.      An electronic signature was used to authenticate this note.    --Elmer Whaley MD

## 2024-11-01 ENCOUNTER — TELEPHONE (OUTPATIENT)
Dept: FAMILY MEDICINE CLINIC | Age: 54
End: 2024-11-01

## 2024-11-01 NOTE — TELEPHONE ENCOUNTER
Pharmacy called to maurice benavides patient's Amlodipine valsartan- HCTZ has been increased , they stated patient was taking 5-160-12.5 mg and it is now 10/160-12.5.   Please advise and thank you

## 2024-11-04 NOTE — TELEPHONE ENCOUNTER
Called pharmacy and informed them that medication has been increased . To 10/160-12.5 mg.  Call ended

## 2024-11-07 ENCOUNTER — HOSPITAL ENCOUNTER (OUTPATIENT)
Age: 54
Setting detail: SPECIMEN
Discharge: HOME | End: 2024-11-07

## 2024-11-07 DIAGNOSIS — Z13.220 SCREENING FOR HYPERLIPIDEMIA: ICD-10-CM

## 2024-11-07 LAB
CHOLEST SERPL-MCNC: 135 MG/DL (ref 0–199)
CHOLESTEROL/HDL RATIO: 2.9
HDLC SERPL-MCNC: 46 MG/DL
LDLC SERPL CALC-MCNC: 67 MG/DL (ref 0–100)
TRIGL SERPL-MCNC: 108 MG/DL
VLDLC SERPL CALC-MCNC: 22 MG/DL (ref 1–30)

## 2024-11-08 ENCOUNTER — OFFICE VISIT (OUTPATIENT)
Dept: FAMILY MEDICINE CLINIC | Age: 54
End: 2024-11-08

## 2024-11-08 VITALS
HEIGHT: 71 IN | SYSTOLIC BLOOD PRESSURE: 138 MMHG | WEIGHT: 315 LBS | HEART RATE: 72 BPM | BODY MASS INDEX: 44.1 KG/M2 | DIASTOLIC BLOOD PRESSURE: 79 MMHG | TEMPERATURE: 98.6 F | OXYGEN SATURATION: 96 %

## 2024-11-08 DIAGNOSIS — E11.22 TYPE 2 DIABETES MELLITUS WITH STAGE 3 CHRONIC KIDNEY DISEASE, WITHOUT LONG-TERM CURRENT USE OF INSULIN, UNSPECIFIED WHETHER STAGE 3A OR 3B CKD (HCC): ICD-10-CM

## 2024-11-08 DIAGNOSIS — N18.30 TYPE 2 DIABETES MELLITUS WITH STAGE 3 CHRONIC KIDNEY DISEASE, WITHOUT LONG-TERM CURRENT USE OF INSULIN, UNSPECIFIED WHETHER STAGE 3A OR 3B CKD (HCC): ICD-10-CM

## 2024-11-08 DIAGNOSIS — Z00.00 MEDICARE ANNUAL WELLNESS VISIT, SUBSEQUENT: Primary | ICD-10-CM

## 2024-11-08 DIAGNOSIS — E66.01 MORBID (SEVERE) OBESITY DUE TO EXCESS CALORIES: ICD-10-CM

## 2024-11-08 DIAGNOSIS — I10 ESSENTIAL HYPERTENSION: ICD-10-CM

## 2024-11-08 RX ORDER — AMLODIPINE BESYLATE VALSARTAN HYDROCHLOROTHIAZIDE 10; 12.5; 16 MG/1; MG/1; MG/1
TABLET, FILM COATED ORAL
Qty: 60 TABLET | Refills: 4 | Status: SHIPPED | OUTPATIENT
Start: 2024-11-08

## 2024-11-08 ASSESSMENT — PATIENT HEALTH QUESTIONNAIRE - PHQ9
SUM OF ALL RESPONSES TO PHQ QUESTIONS 1-9: 0
SUM OF ALL RESPONSES TO PHQ QUESTIONS 1-9: 0
1. LITTLE INTEREST OR PLEASURE IN DOING THINGS: NOT AT ALL
SUM OF ALL RESPONSES TO PHQ QUESTIONS 1-9: 0
SUM OF ALL RESPONSES TO PHQ9 QUESTIONS 1 & 2: 0
2. FEELING DOWN, DEPRESSED OR HOPELESS: NOT AT ALL
SUM OF ALL RESPONSES TO PHQ QUESTIONS 1-9: 0

## 2024-11-08 ASSESSMENT — LIFESTYLE VARIABLES
HOW MANY STANDARD DRINKS CONTAINING ALCOHOL DO YOU HAVE ON A TYPICAL DAY: PATIENT DOES NOT DRINK
HOW OFTEN DO YOU HAVE A DRINK CONTAINING ALCOHOL: MONTHLY OR LESS

## 2024-11-08 NOTE — PATIENT INSTRUCTIONS
Thank you for letting us take care of you today. We hope all your questions were addressed. If a question was overlooked or something else comes to mind after you return home, please contact a member of your Care Team listed below.        Your Care Team at [facility] is Team #4  Parish Christopher M.D. (Faculty)  Ba Kumari M.D. (Resident)  Elmer Whaley M.D.  (Resident)  Shabana Abreu M.D. (Resident)  Gerard Hlal M.D. (Resident)  Hamlet Garcia, MARCELINO Alva, MARCELINO Torres, [facility]  Zeynep Hallman, [facility]  Savana Snow, [facility]  Lizzie Polanco, MARCELINO Hernandez, MARCELINO Ford (LJ) MEDARDO Garcia (Clinical Practice Manager)  Caro Alvarez [facility] (Clinical Pharmacist)       Office phone number: [phone]    If you need to get in right away due to illness, please be advised we have \"Same Day\" appointments available Monday-Friday. Please call us at [phone] option #3 to schedule your \"Same Day\" appointment.

## 2024-11-08 NOTE — PROGRESS NOTES
Attending Physician Statement  I  have discussed the care of Min Ackerman including pertinent history and exam findings with the resident. I agree with the assessment, plan and orders as documented by the resident.      /79 (Site: Left Upper Arm, Position: Sitting, Cuff Size: Large Adult)   Pulse 72   Temp 98.6 °F (37 °C)   Ht 1.803 m (5' 11\")   Wt (!) 148.3 kg (327 lb)   SpO2 96%   BMI 45.61 kg/m²    BP Readings from Last 3 Encounters:   11/08/24 138/79   10/11/24 (!) 148/86   05/31/24 138/85     Wt Readings from Last 3 Encounters:   11/08/24 (!) 148.3 kg (327 lb)   10/11/24 (!) 148.5 kg (327 lb 6.4 oz)   05/31/24 (!) 146.8 kg (323 lb 9.6 oz)          Diagnosis Orders   1. Medicare annual wellness visit, subsequent   DIABETES FOOT EXAM      2. Type 2 diabetes mellitus with stage 3 chronic kidney disease, without long-term current use of insulin, unspecified whether stage 3a or 3b CKD (HCC)  SAM - Arvind No MD, Ophthalmology, [facility] DIABETES FOOT EXAM      3. Essential hypertension  amLODIPine-valsartan-HCTZ -12.5 MG TABS      4. Morbid (severe) obesity due to excess calories (E66.01)        5. Body mass index [BMI] 45.0-49.9, adult (Z68.42)                Ba Barkley DO 11/11/2024 10:05 AM

## 2024-11-08 NOTE — PROGRESS NOTES
DIABETES and HYPERTENSION visit    BP Readings from Last 3 Encounters:   10/11/24 (!) 148/86   05/31/24 138/85   02/23/24 (!) 151/84        Hemoglobin A1C (%)   Date Value   10/11/2024 6.2   05/31/2024 6.0   12/08/2023 6.2     HDL (mg/dL)   Date Value   11/07/2024 46     BUN (mg/dL)   Date Value   02/02/2024 19     Creatinine (mg/dL)   Date Value   02/02/2024 1.1     Glucose (mg/dL)   Date Value   02/02/2024 145 (H)   06/06/2017 222 (H)            Have you changed or started any medications since your last visit including any over-the-counter medicines, vitamins, or herbal medicines? no   Have you stopped taking any of your medications? Is so, why? -  no  Are you having any side effects from any of your medications? - no    Have you seen any other physician or provider since your last visit?  no   Have you had any other diagnostic tests since your last visit?  no   Have you been seen in the emergency room and/or had an admission in a hospital since we last saw you?  no   Have you had your routine dental cleaning in the past 6 months?  no     Have you had your annual diabetic retinal (eye) exam? No   (ensure copy of exam is in the chart)    Do you have an active MyChart account? If no, what is the barrier?  Yes    Patient Care Team:  Elmer Whaley MD as PCP - General (Family Medicine)  Ba Barkley DO as PCP - Empaneled Provider  Uzair Mario MD as Consulting Physician (Pulmonary Disease)    Medical History Review  Past Medical, Family, and Social History reviewed and does not contribute to the patient presenting condition    Health Maintenance   Topic Date Due    Diabetic retinal exam  Never done    Annual Wellness Visit (Medicare Advantage)  01/01/2024    Diabetic foot exam  08/01/2024    COVID-19 Vaccine (5 - 2023-24 season) 09/01/2024    Diabetic Alb to Cr ratio (uACR) test  02/02/2025    GFR test (Diabetes, CKD 3-4, OR last GFR 15-59)  02/02/2025    A1C test (Diabetic or Prediabetic)  10/11/2025

## 2024-11-08 NOTE — PROGRESS NOTES
Medicare Annual Wellness Visit    Min Ackerman is here for Medicare AWV    Assessment & Plan   Medicare annual wellness visit, subsequent    Recommendations for Preventive Services Due: see orders and patient instructions/AVS.  Recommended screening schedule for the next 5-10 years is provided to the patient in written form: see Patient Instructions/AVS.     No follow-ups on file.     Subjective   The following acute and/or chronic problems were also addressed today: Weight loss, diabetes, HTN, referrals.     Colon CA screen in the next 3 years planned     Patient's complete Health Risk Assessment and screening values have been reviewed and are found in Flowsheets. The following problems were reviewed today and where indicated follow up appointments were made and/or referrals ordered.    Positive Risk Factor Screenings with Interventions:        Drug Use:   Substance and Sexual Activity   Drug Use Yes    Frequency: 7.0 times per week    Types: Marijuana (Weed)     DAST-10 Score: 2  Interpretation: 1-2 indicates low level use. Recommendation: monitor and re-assess at a later date  Interventions:  Patient comments: No new symptoms or complaints today. Doing well.            Abnormal BMI (obese):  Body mass index is 45.61 kg/m². (!) Abnormal  Interventions:  Patient advised to follow-up in this office for further evaluation and treatment patient elected not go for weigh management programs       Dentist Screen:  Have you seen the dentist within the past year?: (!) No    Intervention:  Patient declines any further evaluation or treatment     Vision Screen:  Do you have difficulty driving, watching TV, or doing any of your daily activities because of your eyesight?: No  Have you had an eye exam within the past year?: (!) No  Interventions:   Patient encouraged to make appointment with their eye specialist referral ordered     Safety:  Do you have non-slip mats or non-slip surfaces or shower bars or grab bars in your

## 2024-11-11 ENCOUNTER — TELEPHONE (OUTPATIENT)
Dept: FAMILY MEDICINE CLINIC | Age: 54
End: 2024-11-11

## 2024-11-11 NOTE — TELEPHONE ENCOUNTER
----- Message from Dr. Elmer Whaley MD sent at 11/8/2024  8:24 AM EST -----  Cholesterol levels within normal range. No change to his medications at this time.

## 2024-11-13 ENCOUNTER — TELEPHONE (OUTPATIENT)
Dept: FAMILY MEDICINE CLINIC | Age: 54
End: 2024-11-13

## 2024-11-13 DIAGNOSIS — R23.8 SKIN IRRITATION: Primary | ICD-10-CM

## 2024-11-13 RX ORDER — MUPIROCIN 20 MG/G
OINTMENT TOPICAL
Qty: 1 EACH | Refills: 1 | Status: SHIPPED | OUTPATIENT
Start: 2024-11-13 | End: 2024-11-20

## 2024-11-14 NOTE — TELEPHONE ENCOUNTER
Writer called pt  informed him Rx was sent to Pharmacy  
obesity with BMI of 50.0-59.9, adult     ZURDO (obstructive sleep apnea)     Gout     CKD (chronic kidney disease)     Mixed hyperlipidemia     Squamous cell carcinoma of face     Type 2 diabetes mellitus without complication, without long-term current use of insulin (HCC)     Need for prophylactic vaccination and inoculation against varicella     Vitamin D deficiency     BMI 50.0-59.9, adult     Type 2 diabetes mellitus with hyperglycemia     Diabetes mellitus (HCC)     Back spasm     Secondary hypertension     Type 2 diabetes mellitus with chronic kidney disease     Body mass index (BMI) 45.0-49.9, adult     Primary cutaneous lymphoma (HCC)     Stage 3b chronic kidney disease (HCC)

## 2024-11-22 DIAGNOSIS — R23.8 SKIN IRRITATION: ICD-10-CM

## 2024-11-25 RX ORDER — MUPIROCIN 20 MG/G
OINTMENT TOPICAL
Qty: 15 G | Refills: 1 | Status: SHIPPED | OUTPATIENT
Start: 2024-11-25

## 2024-11-25 NOTE — TELEPHONE ENCOUNTER
Last visit: 11/08/2024  Last Med refill: 11/13/2024  Does patient have enough medication for 72 hours: No:     Next Visit Date:  Future Appointments   Date Time Provider Department Center   1/17/2025  8:20 AM Elmer Whaley MD [facility] ECC DEP       Health Maintenance   Topic Date Due    Diabetic retinal exam  Never done    COVID-19 Vaccine (5 - 2023-24 season) 09/01/2024    Diabetic Alb to Cr ratio (uACR) test  02/02/2025    GFR test (Diabetes, CKD 3-4, OR last GFR 15-59)  02/02/2025    A1C test (Diabetic or Prediabetic)  10/11/2025    Lipids  11/07/2025    Diabetic foot exam  11/08/2025    Depression Screen  11/08/2025    Colorectal Cancer Screen  04/19/2026    DTaP/Tdap/Td vaccine (4 - Td or Tdap) 07/10/2032    Hepatitis B vaccine  Completed    Annual Wellness Visit (Medicare Advantage)  Completed    Flu vaccine  Completed    Shingles vaccine  Completed    Pneumococcal 0-64 years Vaccine  Completed    Hepatitis C screen  Completed    HIV screen  Completed    Hepatitis A vaccine  Aged Out    Hib vaccine  Aged Out    Polio vaccine  Aged Out    Meningococcal (ACWY) vaccine  Aged Out       Hemoglobin A1C (%)   Date Value   10/11/2024 6.2   05/31/2024 6.0   12/08/2023 6.2             ( goal A1C is < 7)   No components found for: \"LABMICR\"  No components found for: \"LDLCHOLESTEROL\", \"LDLCALC\"    (goal LDL is <100)   AST (U/L)   Date Value   04/20/2022 15     ALT (U/L)   Date Value   04/20/2022 13     BUN (mg/dL)   Date Value   02/02/2024 19     BP Readings from Last 3 Encounters:   11/08/24 138/79   10/11/24 (!) 148/86   05/31/24 138/85          (goal 120/80)    All Future Testing planned in [facility]  Lab Frequency Next Occurrence   XR KNEE LEFT (1-2 VIEWS) Once 05/31/2024               Patient Active Problem List:     Essential hypertension     Chronic back pain     Morbid obesity with BMI of 50.0-59.9, adult     ZURDO (obstructive sleep apnea)     Gout     CKD (chronic kidney disease)     Mixed hyperlipidemia

## 2024-11-27 RX ORDER — ASPIRIN 81 MG/1
81 TABLET ORAL DAILY
Qty: 30 TABLET | Refills: 5 | Status: SHIPPED | OUTPATIENT
Start: 2024-11-27

## 2024-11-27 NOTE — TELEPHONE ENCOUNTER
Last visit: 11/22/2024  Last Med refill: 6/04/2024  Does patient have enough medication for 72 hours: No:     Next Visit Date:  Future Appointments   Date Time Provider Department Center   1/17/2025  8:20 AM Elmer Whaley MD [facility] E[facility] DEP       Health Maintenance   Topic Date Due    Diabetic retinal exam  Never done    COVID-19 Vaccine (5 - 2023-24 season) 09/01/2024    Diabetic Alb to Cr ratio (uACR) test  02/02/2025    GFR test (Diabetes, CKD 3-4, OR last GFR 15-59)  02/02/2025    A1C test (Diabetic or Prediabetic)  10/11/2025    Lipids  11/07/2025    Diabetic foot exam  11/08/2025    Depression Screen  11/08/2025    Colorectal Cancer Screen  04/19/2026    DTaP/Tdap/Td vaccine (4 - Td or Tdap) 07/10/2032    Hepatitis B vaccine  Completed    Annual Wellness Visit (Medicare Advantage)  Completed    Flu vaccine  Completed    Shingles vaccine  Completed    Pneumococcal 0-64 years Vaccine  Completed    Hepatitis C screen  Completed    HIV screen  Completed    Hepatitis A vaccine  Aged Out    Hib vaccine  Aged Out    Polio vaccine  Aged Out    Meningococcal (ACWY) vaccine  Aged Out       Hemoglobin A1C (%)   Date Value   10/11/2024 6.2   05/31/2024 6.0   12/08/2023 6.2             ( goal A1C is < 7)   No components found for: \"LABMICR\"  No components found for: \"LDLCHOLESTEROL\", \"LDLCALC\"    (goal LDL is <100)   AST (U/L)   Date Value   04/20/2022 15     ALT (U/L)   Date Value   04/20/2022 13     BUN (mg/dL)   Date Value   02/02/2024 19     BP Readings from Last 3 Encounters:   11/08/24 138/79   10/11/24 (!) 148/86   05/31/24 138/85          (goal 120/80)    All Future Testing planned in [facility]  Lab Frequency Next Occurrence   XR KNEE LEFT (1-2 VIEWS) Once 05/31/2024               Patient Active Problem List:     Essential hypertension     Chronic back pain     Morbid obesity with BMI of 50.0-59.9, adult     ZURDO (obstructive sleep apnea)     Gout     CKD (chronic kidney disease)     Mixed hyperlipidemia

## 2024-12-04 DIAGNOSIS — R23.8 SKIN IRRITATION: ICD-10-CM

## 2024-12-04 RX ORDER — MUPIROCIN 20 MG/G
OINTMENT TOPICAL
Qty: 15 G | Refills: 1 | OUTPATIENT
Start: 2024-12-04

## 2024-12-16 DIAGNOSIS — M1A.9XX1 CHRONIC GOUT INVOLVING TOE OF LEFT FOOT WITH TOPHUS, UNSPECIFIED CAUSE: ICD-10-CM

## 2024-12-16 DIAGNOSIS — E11.9 TYPE 2 DIABETES MELLITUS WITHOUT COMPLICATION, WITHOUT LONG-TERM CURRENT USE OF INSULIN (HCC): ICD-10-CM

## 2024-12-16 RX ORDER — ALLOPURINOL 100 MG/1
TABLET ORAL
Qty: 90 TABLET | Refills: 11 | Status: SHIPPED | OUTPATIENT
Start: 2024-12-16

## 2024-12-16 NOTE — TELEPHONE ENCOUNTER
Squamous cell carcinoma of face     Type 2 diabetes mellitus without complication, without long-term current use of insulin (HCC)     Need for prophylactic vaccination and inoculation against varicella     Vitamin D deficiency     BMI 50.0-59.9, adult     Type 2 diabetes mellitus with hyperglycemia     Diabetes mellitus (HCC)     Back spasm     Secondary hypertension     Type 2 diabetes mellitus with chronic kidney disease     Body mass index (BMI) 45.0-49.9, adult     Primary cutaneous lymphoma (HCC)     Stage 3b chronic kidney disease (HCC)

## 2024-12-26 DIAGNOSIS — M1A.9XX1 CHRONIC GOUT INVOLVING TOE OF LEFT FOOT WITH TOPHUS, UNSPECIFIED CAUSE: ICD-10-CM

## 2024-12-26 RX ORDER — ALLOPURINOL 100 MG/1
TABLET ORAL
Qty: 90 TABLET | Refills: 11 | Status: SHIPPED | OUTPATIENT
Start: 2024-12-26

## 2024-12-26 NOTE — TELEPHONE ENCOUNTER
Problem: PAIN - ADULT  Goal: Verbalizes/displays adequate comfort level or baseline comfort level  Description: Interventions:  - Encourage patient to monitor pain and request assistance  - Assess pain using appropriate pain scale  - Administer analgesics based on type and severity of pain and evaluate response  - Implement non-pharmacological measures as appropriate and evaluate response  - Consider cultural and social influences on pain and pain management  - Notify physician/advanced practitioner if interventions unsuccessful or patient reports new pain  Outcome: Progressing     Problem: DISCHARGE PLANNING  Goal: Discharge to home or other facility with appropriate resources  Description: INTERVENTIONS:  - Identify barriers to discharge w/patient and caregiver  - Arrange for needed discharge resources and transportation as appropriate  - Identify discharge learning needs (meds, wound care, etc.)  - Arrange for interpretive services to assist at discharge as needed  - Refer to Case Management Department for coordinating discharge planning if the patient needs post-hospital services based on physician/advanced practitioner order or complex needs related to functional status, cognitive ability, or social support system  Outcome: Progressing      Last visit: 11/8/24  Last Med refill: 12/16/24  Does patient have enough medication for 72 hours: no    Next Visit Date:1/20/25  Future Appointments   Date Time Provider Department Center   1/20/2025 10:00 AM Edward Mccollum MD [facility] E[facility] DEP       Health Maintenance   Topic Date Due    Diabetic retinal exam  Never done    COVID-19 Vaccine (5 - 2023-24 season) 09/01/2024    Diabetic Alb to Cr ratio (uACR) test  02/02/2025    GFR test (Diabetes, CKD 3-4, OR last GFR 15-59)  02/02/2025    A1C test (Diabetic or Prediabetic)  10/11/2025    Lipids  11/07/2025    Diabetic foot exam  11/08/2025    Depression Screen  11/08/2025    Colorectal Cancer Screen  04/19/2026    DTaP/Tdap/Td vaccine (4 - Td or Tdap) 07/10/2032    Hepatitis B vaccine  Completed    Annual Wellness Visit (Medicare Advantage)  Completed    Flu vaccine  Completed    Shingles vaccine  Completed    Pneumococcal 0-64 years Vaccine  Completed    Hepatitis C screen  Completed    HIV screen  Completed    Hepatitis A vaccine  Aged Out    Hib vaccine  Aged Out    Polio vaccine  Aged Out    Meningococcal (ACWY) vaccine  Aged Out       Hemoglobin A1C (%)   Date Value   10/11/2024 6.2   05/31/2024 6.0   12/08/2023 6.2             ( goal A1C is < 7)   No components found for: \"LABMICR\"  No components found for: \"LDLCHOLESTEROL\", \"LDLCALC\"    (goal LDL is <100)   AST (U/L)   Date Value   04/20/2022 15     ALT (U/L)   Date Value   04/20/2022 13     BUN (mg/dL)   Date Value   02/02/2024 19     BP Readings from Last 3 Encounters:   11/08/24 138/79   10/11/24 (!) 148/86   05/31/24 138/85          (goal 120/80)    All Future Testing planned in [facility]  Lab Frequency Next Occurrence   XR KNEE LEFT (1-2 VIEWS) Once 05/31/2024               Patient Active Problem List:     Essential hypertension     Chronic back pain     Morbid obesity with BMI of 50.0-59.9, adult     ZURDO (obstructive sleep apnea)     Gout     CKD (chronic kidney disease)     Mixed hyperlipidemia

## 2024-12-26 NOTE — TELEPHONE ENCOUNTER
----- Message from Terrell PERSON sent at 12/20/2024  9:27 AM EST -----  Regarding: ECC Message to Provider  ECC Message to Provider    Relationship to Patient: Covered Entity [facility] ([facility])     Additional Information Caller would like to leave a message to the office of the patient's PCP Elmer Whaley MD to request refill for  Allopurinol 100 mg tablet.  --------------------------------------------------------------------------------------------------------------------------    Call Back Information: OK to leave message on voicemail  Preferred Call Back Number: Phone [phone]

## 2025-01-27 ENCOUNTER — OFFICE VISIT (OUTPATIENT)
Dept: FAMILY MEDICINE CLINIC | Age: 55
End: 2025-01-27
Payer: MEDICARE

## 2025-01-27 ENCOUNTER — HOSPITAL ENCOUNTER (OUTPATIENT)
Age: 55
Setting detail: SPECIMEN
Discharge: HOME | End: 2025-01-27

## 2025-01-27 VITALS
HEIGHT: 71 IN | SYSTOLIC BLOOD PRESSURE: 128 MMHG | HEART RATE: 71 BPM | WEIGHT: 315 LBS | BODY MASS INDEX: 44.1 KG/M2 | DIASTOLIC BLOOD PRESSURE: 81 MMHG

## 2025-01-27 DIAGNOSIS — R73.03 PREDIABETES: ICD-10-CM

## 2025-01-27 DIAGNOSIS — N18.32 STAGE 3B CHRONIC KIDNEY DISEASE (HCC): ICD-10-CM

## 2025-01-27 DIAGNOSIS — N18.32 STAGE 3B CHRONIC KIDNEY DISEASE (HCC): Primary | ICD-10-CM

## 2025-01-27 DIAGNOSIS — M25.561 CHRONIC PAIN OF RIGHT KNEE: ICD-10-CM

## 2025-01-27 DIAGNOSIS — G89.29 CHRONIC PAIN OF RIGHT KNEE: ICD-10-CM

## 2025-01-27 LAB
ANION GAP SERPL CALCULATED.3IONS-SCNC: 9 MMOL/L (ref 9–16)
BUN SERPL-MCNC: 24 MG/DL (ref 6–20)
CALCIUM SERPL-MCNC: 10.1 MG/DL (ref 8.6–10.4)
CHLORIDE SERPL-SCNC: 97 MMOL/L (ref 98–107)
CHOLEST SERPL-MCNC: 148 MG/DL (ref 0–199)
CHOLESTEROL/HDL RATIO: 3.4
CO2 SERPL-SCNC: 30 MMOL/L (ref 20–31)
CREAT SERPL-MCNC: 1.4 MG/DL (ref 0.7–1.2)
CREAT UR-MCNC: 72.7 MG/DL (ref 39–259)
EST. AVERAGE GLUCOSE BLD GHB EST-MCNC: 154 MG/DL
GFR, ESTIMATED: 59 ML/MIN/1.73M2
GLUCOSE SERPL-MCNC: 175 MG/DL (ref 74–99)
HBA1C MFR BLD: 7 % (ref 4–6)
HDLC SERPL-MCNC: 44 MG/DL
LDLC SERPL CALC-MCNC: 63 MG/DL (ref 0–100)
MICROALBUMIN UR-MCNC: 1181 MG/L (ref 0–20)
MICROALBUMIN/CREAT UR-RTO: 1625 MCG/MG CREAT (ref 0–17)
POTASSIUM SERPL-SCNC: 4.5 MMOL/L (ref 3.7–5.3)
SODIUM SERPL-SCNC: 136 MMOL/L (ref 136–145)
TRIGL SERPL-MCNC: 204 MG/DL
VLDLC SERPL CALC-MCNC: 41 MG/DL (ref 1–30)

## 2025-01-27 PROCEDURE — 99213 OFFICE O/P EST LOW 20 MIN: CPT

## 2025-01-27 PROCEDURE — 1036F TOBACCO NON-USER: CPT

## 2025-01-27 PROCEDURE — G8427 DOCREV CUR MEDS BY ELIG CLIN: HCPCS

## 2025-01-27 PROCEDURE — G8417 CALC BMI ABV UP PARAM F/U: HCPCS

## 2025-01-27 PROCEDURE — 3074F SYST BP LT 130 MM HG: CPT

## 2025-01-27 PROCEDURE — 3017F COLORECTAL CA SCREEN DOC REV: CPT

## 2025-01-27 PROCEDURE — 3079F DIAST BP 80-89 MM HG: CPT

## 2025-01-27 RX ORDER — ACETAMINOPHEN 500 MG
TABLET ORAL
Qty: 180 TABLET | Refills: 1 | Status: SHIPPED | OUTPATIENT
Start: 2025-01-27

## 2025-01-27 SDOH — ECONOMIC STABILITY: FOOD INSECURITY: WITHIN THE PAST 12 MONTHS, THE FOOD YOU BOUGHT JUST DIDN'T LAST AND YOU DIDN'T HAVE MONEY TO GET MORE.: SOMETIMES TRUE

## 2025-01-27 SDOH — ECONOMIC STABILITY: FOOD INSECURITY: WITHIN THE PAST 12 MONTHS, YOU WORRIED THAT YOUR FOOD WOULD RUN OUT BEFORE YOU GOT MONEY TO BUY MORE.: SOMETIMES TRUE

## 2025-01-27 ASSESSMENT — PATIENT HEALTH QUESTIONNAIRE - PHQ9
SUM OF ALL RESPONSES TO PHQ QUESTIONS 1-9: 0
1. LITTLE INTEREST OR PLEASURE IN DOING THINGS: NOT AT ALL
2. FEELING DOWN, DEPRESSED OR HOPELESS: NOT AT ALL
SUM OF ALL RESPONSES TO PHQ QUESTIONS 1-9: 0
SUM OF ALL RESPONSES TO PHQ9 QUESTIONS 1 & 2: 0

## 2025-01-27 ASSESSMENT — ENCOUNTER SYMPTOMS: BACK PAIN: 0

## 2025-01-27 NOTE — PROGRESS NOTES
[facility] - Outpatient Note      Subjective:    Min Ackerman is a 54 y.o. male with  has a past medical history of Hypertension and Obesity.    Presented to the office today for:  Chief Complaint   Patient presents with    Hypertension     Follow up    Diabetes    Chronic Kidney Disease     Stage 3b = HCC GAP       HPI  Patient here for right knee pain, diabetes mellitus and CKD follow-up    Diabetes Mellitus  Previous A1c 6.2.  Patient compliant with his diet and medication.  Reports no new symptoms including dizziness, lightheadedness, polyuria, polydipsia, or any other associated symptoms      CKD 3b  Patient has a history of CKD stage IIIb.  Has not seen nephrologist for a long time.  Does not report any new symptoms.  No recent BMP and microalbumin urine on file    Rt knee Pain  Patient reports right knee pain, onset 4 years ago, gradually worsening.  Pain located on medial side.  Improves with rest and worsens with movement and weightbearing.  Patient reports no trauma, fever, chills, redness, swelling or gait abnormality        Review of Systems   Constitutional:  Negative for chills, fatigue and fever.   Cardiovascular:  Negative for chest pain and palpitations.   Musculoskeletal:  Positive for arthralgias. Negative for back pain, gait problem and joint swelling.         The patient has a No family history on file.    Objective:    /81 (Site: Left Upper Arm, Position: Sitting, Cuff Size: Medium Adult)   Pulse 71   Ht 1.803 m (5' 10.98\")   Wt (!) 147 kg (324 lb)   BMI 45.21 kg/m²    BP Readings from Last 3 Encounters:   01/27/25 128/81   11/08/24 138/79   10/11/24 (!) 148/86       Physical Exam  Vitals reviewed.   Cardiovascular:      Rate and Rhythm: Normal rate.      Pulses: Normal pulses.      Heart sounds: Normal heart sounds.   Pulmonary:      Effort: Pulmonary effort is normal.      Breath sounds: Normal breath sounds.

## 2025-01-27 NOTE — PATIENT INSTRUCTIONS
[facility] Food Resources*  (Call [facility] for more resources)    [facility] [employer]  What they offer: Food pantry second and fourth Tuesday 4:30-6pm  Phone Number and Address: [phone] Toll Free: [employer] [place], between [employer] and [employer]; [address] [address]  [facility] Office on Aging [facility]  What they offer: “Meals & Nutrition” search option on website  Phone Number and Website: 523.971.5429; https://[employer]/  [facility]  What they offer: Dinner is open to all (must be registered and in good standing) and three hot meals a day for shelter residents. Breakfast 7-8am, Lunch 12-1pm, and Dinner 5-6pm daily.  Phone and Address: [phone]; [address] [address]  [employer] Delivery program  What they offer: Food Box Delivery for those within [facility] who are homebound or without transportation. Applications done by phone. Qualifying individuals are eligible for 3 deliveries for the lifetime of the program.  Phone number: Call for eligibility check at 2-1-1 or 5-749-516Prylos (7057)  [facility]  What they offer: Food Pantry second and fourth Saturday 1-5pm  Phone and Address: [phone]; [address] [address]  [employer] Mobile Food Pantries   What they offer: Mobile pantries throughout the [facility]. Anyone in need may visit one pantry per month.  Phone Number and Website: For locations and schedule call 2-1-1 or 6-013-114-HELP (6685) or check the website www.feedtoledo.org  [employer] [place] [employer] Charities  What they offer: Food Pantry, call for schedule, open to All  Phone Number: [phone]  [facility]ping [facility]  What they offer: Hot meals, Breakfast: Monday, Wednesday, Friday 8-10am, Lunch: Monday-Friday 10am-12:30pm. Food pantry (serves [address] or [place] of [facility]) Tuesday

## 2025-01-27 NOTE — PROGRESS NOTES
DIABETES and HYPERTENSION visit    BP Readings from Last 3 Encounters:   11/08/24 138/79   10/11/24 (!) 148/86   05/31/24 138/85        Hemoglobin A1C (%)   Date Value   10/11/2024 6.2   05/31/2024 6.0   12/08/2023 6.2     HDL (mg/dL)   Date Value   11/07/2024 46     BUN (mg/dL)   Date Value   02/02/2024 19     Creatinine (mg/dL)   Date Value   02/02/2024 1.1     Glucose (mg/dL)   Date Value   02/02/2024 145 (H)   06/06/2017 222 (H)            Have you changed or started any medications since your last visit including any over-the-counter medicines, vitamins, or herbal medicines? no   Have you stopped taking any of your medications? Is so, why? -  no  Are you having any side effects from any of your medications? - no    Have you seen any other physician or provider since your last visit?  no   Have you had any other diagnostic tests since your last visit?  no   Have you been seen in the emergency room and/or had an admission in a hospital since we last saw you?  no   Have you had your routine dental cleaning in the past 6 months?  no     Have you had your annual diabetic retinal (eye) exam? No   (ensure copy of exam is in the chart)    Do you have an active MyChart account? If no, what is the barrier?  Yes    Patient Care Team:  Elmer Whaley MD as PCP - General (Family Medicine)  Uzair Mario MD as Consulting Physician (Pulmonary Disease)    Medical History Review  Past Medical, Family, and Social History reviewed and does contribute to the patient presenting condition    Health Maintenance   Topic Date Due    Diabetic retinal exam  Never done    COVID-19 Vaccine (5 - 2023-24 season) 09/01/2024    Diabetic Alb to Cr ratio (uACR) test  02/02/2025    GFR test (Diabetes, CKD 3-4, OR last GFR 15-59)  02/02/2025    A1C test (Diabetic or Prediabetic)  10/11/2025    Lipids  11/07/2025    Diabetic foot exam  11/08/2025    Depression Screen  11/08/2025    Colorectal Cancer Screen  04/19/2026    DTaP/Tdap/Td vaccine

## 2025-01-27 NOTE — PROGRESS NOTES
Attending Physician Statement  I  have discussed the care of Min Ackerman including pertinent history and exam findings with the resident. I agree with the assessment, plan and orders as documented by the resident.      /81 (Site: Left Upper Arm, Position: Sitting, Cuff Size: Medium Adult)   Pulse 71   Ht 1.803 m (5' 10.98\")   Wt (!) 147 kg (324 lb)   BMI 45.21 kg/m²    BP Readings from Last 3 Encounters:   01/27/25 128/81   11/08/24 138/79   10/11/24 (!) 148/86     Wt Readings from Last 3 Encounters:   01/27/25 (!) 147 kg (324 lb)   11/08/24 (!) 148.3 kg (327 lb)   10/11/24 (!) 148.5 kg (327 lb 6.4 oz)          Diagnosis Orders   1. Stage 3b chronic kidney disease (HCC)  AFL (Epic) - Goldie Lei MD, Nephrology, [place]    Basic Metabolic Panel    Albumin/Creatinine Ratio, Urine    Lipid Panel    Hemoglobin A1C      2. Chronic pain of right knee  acetaminophen (V-R NON-ASPIRIN) 500 MG tablet    XR KNEE RIGHT (3 VIEWS)      3. Prediabetes  Hemoglobin A1C              Ba Barkley DO 1/27/2025 5:09 PM

## 2025-02-13 DIAGNOSIS — M25.561 CHRONIC PAIN OF RIGHT KNEE: ICD-10-CM

## 2025-02-13 DIAGNOSIS — G89.29 CHRONIC PAIN OF RIGHT KNEE: ICD-10-CM

## 2025-02-13 RX ORDER — PSEUDOEPHED/ACETAMINOPH/DIPHEN 30MG-500MG
TABLET ORAL
Qty: 180 TABLET | Refills: 1 | Status: SHIPPED | OUTPATIENT
Start: 2025-02-13

## 2025-02-13 NOTE — TELEPHONE ENCOUNTER
Please address the medication refill and close the encounter.  If I can be of assistance, please route to the applicable pool.      Thank you.      Last visit: 1-  Last Med refill: 1-  Does patient have enough medication for 72 hours: No:     Next Visit Date:  Future Appointments   Date Time Provider Department Center   3/28/2025  8:20 AM Elmer Whaley MD [facility] E[facility] DEP       Health Maintenance   Topic Date Due    Diabetic retinal exam  Never done    COVID-19 Vaccine (5 - 2024-25 season) 09/01/2024    Annual Wellness Visit (Medicare Advantage)  01/01/2025    Diabetic foot exam  11/08/2025    A1C test (Diabetic or Prediabetic)  01/27/2026    Diabetic Alb to Cr ratio (uACR) test  01/27/2026    Lipids  01/27/2026    Depression Screen  01/27/2026    GFR test (Diabetes, CKD 3-4, OR last GFR 15-59)  01/27/2026    Colorectal Cancer Screen  04/19/2026    DTaP/Tdap/Td vaccine (4 - Td or Tdap) 07/10/2032    Hepatitis B vaccine  Completed    Flu vaccine  Completed    Shingles vaccine  Completed    Pneumococcal 50+ years Vaccine  Completed    Hepatitis C screen  Completed    HIV screen  Completed    Hepatitis A vaccine  Aged Out    Hib vaccine  Aged Out    Polio vaccine  Aged Out    Meningococcal (ACWY) vaccine  Aged Out    Pneumococcal 0-49 years Vaccine  Discontinued       Hemoglobin A1C (%)   Date Value   01/27/2025 7.0 (H)   10/11/2024 6.2   05/31/2024 6.0             ( goal A1C is < 7)   No components found for: \"LABMICR\"  No components found for: \"LDLCHOLESTEROL\", \"LDLCALC\"    (goal LDL is <100)   AST (U/L)   Date Value   04/20/2022 15     ALT (U/L)   Date Value   04/20/2022 13     BUN (mg/dL)   Date Value   01/27/2025 24 (H)     BP Readings from Last 3 Encounters:   01/27/25 128/81   11/08/24 138/79   10/11/24 (!) 148/86          (goal 120/80)    All Future Testing planned in [facility]  Lab Frequency Next Occurrence   XR KNEE LEFT (1-2 VIEWS) Once 05/31/2024   XR KNEE RIGHT (3 VIEWS) Once 02/27/2025

## 2025-02-26 ENCOUNTER — TELEPHONE (OUTPATIENT)
Dept: FAMILY MEDICINE CLINIC | Age: 55
End: 2025-02-26

## 2025-02-26 NOTE — TELEPHONE ENCOUNTER
Messi from [facility] called to get patient's diagnosis for diabetes.  The Diagnosis code was given , call ended

## 2025-03-19 DIAGNOSIS — G89.29 CHRONIC PAIN OF RIGHT KNEE: ICD-10-CM

## 2025-03-19 DIAGNOSIS — M25.561 CHRONIC PAIN OF RIGHT KNEE: ICD-10-CM

## 2025-03-19 RX ORDER — PSEUDOEPHED/ACETAMINOPH/DIPHEN 30MG-500MG
TABLET ORAL
Qty: 180 TABLET | Refills: 1 | Status: SHIPPED | OUTPATIENT
Start: 2025-03-19

## 2025-03-19 NOTE — TELEPHONE ENCOUNTER
disease)     Mixed hyperlipidemia     Squamous cell carcinoma of face     Type 2 diabetes mellitus without complication, without long-term current use of insulin (HCC)     Need for prophylactic vaccination and inoculation against varicella     Vitamin D deficiency     BMI 50.0-59.9, adult     Type 2 diabetes mellitus with hyperglycemia     Diabetes mellitus (HCC)     Back spasm     Secondary hypertension     Type 2 diabetes mellitus with chronic kidney disease     Body mass index (BMI) 45.0-49.9, adult     Primary cutaneous lymphoma (HCC)     Stage 3b chronic kidney disease (HCC)     Chronic pain of right knee

## 2025-04-11 ENCOUNTER — HOSPITAL ENCOUNTER (EMERGENCY)
Age: 55
Discharge: HOME | End: 2025-04-11
Attending: EMERGENCY MEDICINE
Payer: MEDICARE

## 2025-04-11 VITALS
WEIGHT: 315 LBS | TEMPERATURE: 98.6 F | DIASTOLIC BLOOD PRESSURE: 96 MMHG | HEIGHT: 71 IN | HEART RATE: 85 BPM | RESPIRATION RATE: 18 BRPM | OXYGEN SATURATION: 96 % | SYSTOLIC BLOOD PRESSURE: 176 MMHG | BODY MASS INDEX: 44.1 KG/M2

## 2025-04-11 DIAGNOSIS — M54.42 ACUTE LEFT-SIDED LOW BACK PAIN WITH LEFT-SIDED SCIATICA: Primary | ICD-10-CM

## 2025-04-11 PROCEDURE — 96372 THER/PROPH/DIAG INJ SC/IM: CPT

## 2025-04-11 PROCEDURE — 99284 EMERGENCY DEPT VISIT MOD MDM: CPT

## 2025-04-11 PROCEDURE — 6360000002 HC RX W HCPCS: Performed by: EMERGENCY MEDICINE

## 2025-04-11 RX ORDER — KETOROLAC TROMETHAMINE 30 MG/ML
30 INJECTION, SOLUTION INTRAMUSCULAR; INTRAVENOUS ONCE
Status: COMPLETED | OUTPATIENT
Start: 2025-04-11 | End: 2025-04-11

## 2025-04-11 RX ORDER — HYDROCODONE BITARTRATE AND ACETAMINOPHEN 5; 325 MG/1; MG/1
1 TABLET ORAL EVERY 12 HOURS PRN
Qty: 6 TABLET | Refills: 0 | Status: SHIPPED | OUTPATIENT
Start: 2025-04-11 | End: 2025-04-14

## 2025-04-11 RX ORDER — LIDOCAINE 50 MG/G
1 PATCH TOPICAL DAILY
Qty: 10 PATCH | Refills: 0 | Status: SHIPPED | OUTPATIENT
Start: 2025-04-11 | End: 2025-04-21

## 2025-04-11 RX ADMIN — KETOROLAC TROMETHAMINE 30 MG: 30 INJECTION, SOLUTION INTRAMUSCULAR at 18:55

## 2025-04-11 ASSESSMENT — ENCOUNTER SYMPTOMS
SHORTNESS OF BREATH: 0
VOMITING: 0
EYE REDNESS: 0
DIARRHEA: 0
BACK PAIN: 1
ABDOMINAL PAIN: 0
COUGH: 0
SORE THROAT: 0
EYE PAIN: 0

## 2025-04-11 ASSESSMENT — PAIN DESCRIPTION - ORIENTATION: ORIENTATION: LEFT

## 2025-04-11 ASSESSMENT — PAIN DESCRIPTION - LOCATION: LOCATION: LEG

## 2025-04-11 ASSESSMENT — PAIN SCALES - GENERAL
PAINLEVEL_OUTOF10: 10
PAINLEVEL_OUTOF10: 10

## 2025-04-11 ASSESSMENT — PAIN - FUNCTIONAL ASSESSMENT: PAIN_FUNCTIONAL_ASSESSMENT: 0-10

## 2025-04-11 NOTE — ED PROVIDER NOTES
[facility] EMERGENCY DEPARTMENT  EMERGENCY MEDICINE     Pt Name: Min Ackerman  MRN: [id number]  Birthdate 1970  Date of evaluation: 4/11/2025  PCP:    Elmer Whaley MD  Provider: Galileo Au DO    CHIEF COMPLAINT       Chief Complaint   Patient presents with    Leg Pain     Left leg pain that begins in his left lower back and radiates to his left knee. Hx of right sided sciatica pain but not left side.       HISTORY OF PRESENT ILLNESS    Patient is 54-year-old male who presents with left-sided back pain that radiates down the back of his left leg.  Patient has had similar symptoms in his right leg in the past.  Patient denies any trauma or falls.  No fevers chills.  Patient states he does a lot of pushing at work.         Triage notes and Nursing notes were reviewed by myself.  Any discrepancies are addressed above.    PAST MEDICAL HISTORY     Past Medical History:   Diagnosis Date    Hypertension     Obesity        SURGICAL HISTORY     No past surgical history on file.    CURRENT MEDICATIONS       Previous Medications    ACETAMINOPHEN EXTRA STRENGTH 500 MG TABS    TAKE TWO TABLETS BY MOUTH EVERY 6 HOURS    ALCOHOL SWABS (ALCOHOL PREP) 70 % PADS    USE FOR GLUCOSE CHECK 3 TO 4 TIMES DAILY    ALLOPURINOL (ZYLOPRIM) 100 MG TABLET    TAKE ONE TABLET BY MOUTH DAILY AT 9AM    AMLODIPINE-VALSARTAN-HCTZ -12.5 MG TABS    TAKE ONE TABLET BY MOUTH DAILY AT 9AM    ASPIRIN (ASPIRIN LOW DOSE) 81 MG EC TABLET    Take 1 tablet by mouth daily    ATORVASTATIN (LIPITOR) 40 MG TABLET    TAKE ONE TABLET BY MOUTH DAILY AT 5PM    CARVEDILOL (COREG) 25 MG TABLET    Take 1 tablet by mouth 2 times daily (with meals)    HYDRALAZINE (APRESOLINE) 100 MG TABLET    TAKE ONE TABLET BY MOUTH THREE TIMES DAILY @9AM, 1PM & 5PM    LANCETS MISC    1 each by Does not apply route 3 times daily    METFORMIN (GLUCOPHAGE) 500 MG TABLET    Take 1 tablet by mouth with breakfast and with evening meal    MUPIROCIN (BACTROBAN) 2 %  OINTMENT    APPLY TOPICALLY THREE TIMES DAILY       ALLERGIES     No Known Allergies    FAMILY HISTORY     No family history on file.     SOCIAL HISTORY       Social History     Socioeconomic History    Marital status: Single   Tobacco Use    Smoking status: Former     Current packs/day: 0.00     Average packs/day: 0.3 packs/day for 5.0 years (1.3 ttl pk-yrs)     Types: Cigarettes     Start date: 2003     Quit date: 2008     Years since quittin.0     Passive exposure: Never    Smokeless tobacco: Never   Substance and Sexual Activity    Alcohol use: Yes    Drug use: Yes     Frequency: 7.0 times per week     Types: Marijuana (Weed)    Sexual activity: Yes     Partners: Female     Social Drivers of Health     Financial Resource Strain: Low Risk  (2024)    Overall Financial Resource Strain (CARDIA)     Difficulty of Paying Living Expenses: Not hard at all   Food Insecurity: Food Insecurity Present (2025)    Hunger Vital Sign     Worried About Running Out of Food in the Last Year: Sometimes true     Ran Out of Food in the Last Year: Sometimes true   Transportation Needs: No Transportation Needs (2025)    PRAPARE - Transportation     Lack of Transportation (Medical): No     Lack of Transportation (Non-Medical): No   Physical Activity: Insufficiently Active (2024)    Exercise Vital Sign     Days of Exercise per Week: 7 days     Minutes of Exercise per Session: 20 min    [facility]    [place]   Housing Stability: Low Risk  (2025)    Housing Stability Vital Sign     Unable to Pay for Housing in the Last Year: No     Number of Times Moved in the Last Year: 0     Homeless in the Last Year: No       REVIEW OF SYSTEMS     Review of Systems   Constitutional:  Negative for fever.   HENT:  Negative for nosebleeds and sore throat.    Eyes:  Negative for pain and redness.   Respiratory:  Negative for cough and shortness of

## 2025-04-23 ENCOUNTER — HOSPITAL ENCOUNTER (EMERGENCY)
Age: 55
Discharge: HOME | End: 2025-04-23
Attending: STUDENT IN AN ORGANIZED HEALTH CARE EDUCATION/TRAINING PROGRAM
Payer: MEDICARE

## 2025-04-23 VITALS
HEART RATE: 88 BPM | OXYGEN SATURATION: 97 % | RESPIRATION RATE: 17 BRPM | SYSTOLIC BLOOD PRESSURE: 166 MMHG | BODY MASS INDEX: 43.93 KG/M2 | WEIGHT: 315 LBS | DIASTOLIC BLOOD PRESSURE: 92 MMHG | TEMPERATURE: 97.7 F

## 2025-04-23 DIAGNOSIS — M54.40 BACK PAIN OF LUMBAR REGION WITH SCIATICA: Primary | ICD-10-CM

## 2025-04-23 PROCEDURE — 6360000002 HC RX W HCPCS: Performed by: STUDENT IN AN ORGANIZED HEALTH CARE EDUCATION/TRAINING PROGRAM

## 2025-04-23 PROCEDURE — 6370000000 HC RX 637 (ALT 250 FOR IP): Performed by: STUDENT IN AN ORGANIZED HEALTH CARE EDUCATION/TRAINING PROGRAM

## 2025-04-23 PROCEDURE — 99284 EMERGENCY DEPT VISIT MOD MDM: CPT | Performed by: STUDENT IN AN ORGANIZED HEALTH CARE EDUCATION/TRAINING PROGRAM

## 2025-04-23 PROCEDURE — 96372 THER/PROPH/DIAG INJ SC/IM: CPT | Performed by: STUDENT IN AN ORGANIZED HEALTH CARE EDUCATION/TRAINING PROGRAM

## 2025-04-23 RX ORDER — LIDOCAINE 4 G/G
1 PATCH TOPICAL ONCE
Status: DISCONTINUED | OUTPATIENT
Start: 2025-04-23 | End: 2025-04-23 | Stop reason: HOSPADM

## 2025-04-23 RX ORDER — KETOROLAC TROMETHAMINE 30 MG/ML
30 INJECTION, SOLUTION INTRAMUSCULAR; INTRAVENOUS ONCE
Status: COMPLETED | OUTPATIENT
Start: 2025-04-23 | End: 2025-04-23

## 2025-04-23 RX ORDER — LIDOCAINE 4 G/G
1 PATCH TOPICAL DAILY
Qty: 5 EACH | Refills: 0 | Status: SHIPPED | OUTPATIENT
Start: 2025-04-23 | End: 2025-04-28

## 2025-04-23 RX ORDER — CYCLOBENZAPRINE HCL 10 MG
10 TABLET ORAL 3 TIMES DAILY PRN
Qty: 10 TABLET | Refills: 0 | Status: SHIPPED | OUTPATIENT
Start: 2025-04-23 | End: 2025-05-03

## 2025-04-23 RX ORDER — HYDROCODONE BITARTRATE AND ACETAMINOPHEN 5; 325 MG/1; MG/1
1 TABLET ORAL EVERY 6 HOURS PRN
Qty: 6 TABLET | Refills: 0 | Status: SHIPPED | OUTPATIENT
Start: 2025-04-23 | End: 2025-04-26

## 2025-04-23 RX ADMIN — KETOROLAC TROMETHAMINE 30 MG: 30 INJECTION, SOLUTION INTRAMUSCULAR at 11:22

## 2025-04-23 ASSESSMENT — ENCOUNTER SYMPTOMS
BACK PAIN: 1
SHORTNESS OF BREATH: 0
ABDOMINAL PAIN: 0

## 2025-04-23 ASSESSMENT — PAIN DESCRIPTION - LOCATION: LOCATION: BACK

## 2025-04-23 ASSESSMENT — PAIN DESCRIPTION - DESCRIPTORS: DESCRIPTORS: ACHING

## 2025-04-23 ASSESSMENT — PAIN SCALES - GENERAL: PAINLEVEL_OUTOF10: 8

## 2025-04-23 ASSESSMENT — LIFESTYLE VARIABLES
HOW OFTEN DO YOU HAVE A DRINK CONTAINING ALCOHOL: NEVER
HOW MANY STANDARD DRINKS CONTAINING ALCOHOL DO YOU HAVE ON A TYPICAL DAY: PATIENT DOES NOT DRINK

## 2025-04-23 ASSESSMENT — PAIN DESCRIPTION - ORIENTATION: ORIENTATION: RIGHT

## 2025-04-23 NOTE — DISCHARGE INSTRUCTIONS
Call today or tomorrow to follow up with Elmer Whaley MD  in 4-5 days.    Use an ice pack or bag filled with ice and apply to the injured area 3 - 4 times a day for 15 - 20 minutes each time.  If the injury is older than 3 days, then use a heating pad to help relax the muscles in your back.    Tylenol can be taken every 6 hours. Ibuprofen can be taken every 6 hours. It is recommended you alternate the two every three hours.     Example pain medication schedule:  - 9am: Tylenol (500-1000mg)  - 12 pm/noon: Ibuprofen (400-600mg)  - 3pm: Tylenol  - 6pm: Ibuprofen    Maximum dose of tylenol in a 24 hour period is 4000mg.  Begin weaning from ibuprofen in 2-3 days to prevent stomach complications.     Do not use ibuprofen with naproxen. Choose one or the other to use, as they are both similar medications and can cause stomach ulcer.    You can also use the lidocaine patches to help with your pain symptoms. Please only use for 12 hours at a time. Then leave off for 12 hours before applying a new patch.    Continue to use your previously prescribed muscle relaxer.    Return to the Emergency Department for inability to move legs, worsening of pain, tingling / loss of sensation, fever, any other care or concern.

## 2025-04-23 NOTE — ED PROVIDER NOTES
[facility] EMERGENCY DEPARTMENT  EMERGENCY DEPARTMENT ENCOUNTER    Pt Name: Min Ackerman  MRN: [id number]  [id number]  Date of evaluation: 4/23/2025    Note Started: 10:49 AM EDT    CHIEF COMPLAINT       Chief Complaint   Patient presents with    Back Pain     X 3 weeks     HISTORY OF PRESENT ILLNESS    Min Ackerman is a 54 y.o. male who presents to the ED for back pain ongoing for the past 2 weeks.  Patient was previously seen at [facility] on 4/11/2025 and diagnosed with left-sided low back pain with sciatica.  At that time he recalled no specific trauma.  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  No trauma since last evaluated in the emergency department on 4/11/2025 for he was prescribed lidocaine patches and [place] but states that his insurance would not cover the lidocaine patches and the Norco was never delivered to his house via the mail pharmacy.  He states that he has been intermittently taking naproxen at home without significant pain relief.  Patient believes he already has a muscle relaxer at home but is not sure.    Pain is exacerbated by ambulation and laying on the left side.  He denies fevers.  No history of IV drug use.  No saddle anesthesia or numbness in the legs.  Does endorse pain radiating down to the left knee when the pain suddenly worsens in the back.  No loss of bowel or bladder function.  No abdominal pain or chest pain.    REVIEW OF SYSTEMS       Review of Systems   Constitutional:  Negative for fever.   Respiratory:  Negative for shortness of breath.    Cardiovascular:  Negative for chest pain.   Gastrointestinal:  Negative for abdominal pain.   Genitourinary:  Negative for difficulty urinating.   Musculoskeletal:  Positive for back pain and gait problem.       PAST MEDICAL HISTORY    has a past medical history of Hypertension and Obesity.    SURGICAL HISTORY      has no past

## 2025-04-24 DIAGNOSIS — G89.29 CHRONIC PAIN OF RIGHT KNEE: ICD-10-CM

## 2025-04-24 DIAGNOSIS — M25.561 CHRONIC PAIN OF RIGHT KNEE: ICD-10-CM

## 2025-04-24 RX ORDER — PSEUDOEPHED/ACETAMINOPH/DIPHEN 30MG-500MG
TABLET ORAL
Qty: 180 TABLET | Refills: 1 | Status: SHIPPED | OUTPATIENT
Start: 2025-04-24

## 2025-04-24 NOTE — TELEPHONE ENCOUNTER
Last visit: 01/27/2025  Last Med refill: 03/19/2025  Does patient have enough medication for 72 hours: No:     Next Visit Date:  No future appointments.    Health Maintenance   Topic Date Due    Diabetic retinal exam  Never done    COVID-19 Vaccine (5 - 2024-25 season) 09/01/2024    Annual Wellness Visit (Medicare Advantage)  01/01/2025    Diabetic foot exam  11/08/2025    A1C test (Diabetic or Prediabetic)  01/27/2026    Diabetic Alb to Cr ratio (uACR) test  01/27/2026    Lipids  01/27/2026    Depression Screen  01/27/2026    GFR test (Diabetes, CKD 3-4, OR last GFR 15-59)  01/27/2026    Colorectal Cancer Screen  04/19/2026    DTaP/Tdap/Td vaccine (4 - Td or Tdap) 07/10/2032    Hepatitis B vaccine  Completed    Flu vaccine  Completed    Shingles vaccine  Completed    Pneumococcal 50+ years Vaccine  Completed    Hepatitis C screen  Completed    HIV screen  Completed    Hepatitis A vaccine  Aged Out    Hib vaccine  Aged Out    Polio vaccine  Aged Out    Meningococcal (ACWY) vaccine  Aged Out    Meningococcal B vaccine  Aged Out    Pneumococcal 0-49 years Vaccine  Discontinued       Hemoglobin A1C (%)   Date Value   01/27/2025 7.0 (H)   10/11/2024 6.2   05/31/2024 6.0             ( goal A1C is < 7)   No components found for: \"LABMICR\"  No components found for: \"LDLCHOLESTEROL\", \"LDLCALC\"    (goal LDL is <100)   AST (U/L)   Date Value   04/20/2022 15     ALT (U/L)   Date Value   04/20/2022 13     BUN (mg/dL)   Date Value   01/27/2025 24 (H)     BP Readings from Last 3 Encounters:   04/23/25 (!) 166/92   04/11/25 (!) 176/96   01/27/25 128/81          (goal 120/80)    All Future Testing planned in [facility]  Lab Frequency Next Occurrence   XR KNEE LEFT (1-2 VIEWS) Once 05/31/2024   XR KNEE RIGHT (3 VIEWS) Once 02/27/2025               Patient Active Problem List:     Essential hypertension     Chronic back pain     Morbid obesity with BMI of 50.0-59.9, adult (HCC)     ZURDO (obstructive sleep apnea)     Gout     CKD  The patient is a 25y Male complaining of rib pain/injury.

## 2025-04-29 RX ORDER — ASPIRIN 81 MG/1
TABLET, COATED ORAL
Qty: 30 TABLET | Refills: 5 | Status: SHIPPED | OUTPATIENT
Start: 2025-04-29

## 2025-04-29 NOTE — TELEPHONE ENCOUNTER
Last visit: 1/27/25  Last Med refill: 11/27/24  Does patient have enough medication for 72 hours: no    Next Visit Date:  No future appointments.    Health Maintenance   Topic Date Due    Diabetic retinal exam  Never done    COVID-19 Vaccine (5 - 2024-25 season) 09/01/2024    Annual Wellness Visit (Medicare Advantage)  01/01/2025    Diabetic foot exam  11/08/2025    A1C test (Diabetic or Prediabetic)  01/27/2026    Diabetic Alb to Cr ratio (uACR) test  01/27/2026    Lipids  01/27/2026    Depression Screen  01/27/2026    GFR test (Diabetes, CKD 3-4, OR last GFR 15-59)  01/27/2026    Colorectal Cancer Screen  04/19/2026    DTaP/Tdap/Td vaccine (4 - Td or Tdap) 07/10/2032    Hepatitis B vaccine  Completed    Flu vaccine  Completed    Shingles vaccine  Completed    Pneumococcal 50+ years Vaccine  Completed    Hepatitis C screen  Completed    HIV screen  Completed    Hepatitis A vaccine  Aged Out    Hib vaccine  Aged Out    Polio vaccine  Aged Out    Meningococcal (ACWY) vaccine  Aged Out    Meningococcal B vaccine  Aged Out    Pneumococcal 0-49 years Vaccine  Discontinued       Hemoglobin A1C (%)   Date Value   01/27/2025 7.0 (H)   10/11/2024 6.2   05/31/2024 6.0             ( goal A1C is < 7)   No components found for: \"LABMICR\"  No components found for: \"LDLCHOLESTEROL\", \"LDLCALC\"    (goal LDL is <100)   AST (U/L)   Date Value   04/20/2022 15     ALT (U/L)   Date Value   04/20/2022 13     BUN (mg/dL)   Date Value   01/27/2025 24 (H)     BP Readings from Last 3 Encounters:   04/23/25 (!) 166/92   04/11/25 (!) 176/96   01/27/25 128/81          (goal 120/80)    All Future Testing planned in [facility]  Lab Frequency Next Occurrence   XR KNEE LEFT (1-2 VIEWS) Once 05/31/2024   XR KNEE RIGHT (3 VIEWS) Once 02/27/2025               Patient Active Problem List:     Essential hypertension     Chronic back pain     Morbid obesity with BMI of 50.0-59.9, adult (HCC)     ZURDO (obstructive sleep apnea)     Gout     CKD (chronic

## 2025-05-27 DIAGNOSIS — G89.29 CHRONIC PAIN OF RIGHT KNEE: ICD-10-CM

## 2025-05-27 DIAGNOSIS — M25.561 CHRONIC PAIN OF RIGHT KNEE: ICD-10-CM

## 2025-05-27 RX ORDER — PSEUDOEPHED/ACETAMINOPH/DIPHEN 30MG-500MG
TABLET ORAL
Qty: 180 TABLET | Refills: 1 | Status: SHIPPED | OUTPATIENT
Start: 2025-05-27

## 2025-05-27 NOTE — TELEPHONE ENCOUNTER
kidney disease)     Mixed hyperlipidemia     Squamous cell carcinoma of face     Type 2 diabetes mellitus without complication, without long-term current use of insulin (HCC)     Need for prophylactic vaccination and inoculation against varicella     Vitamin D deficiency     BMI 50.0-59.9, adult (HCC)     Type 2 diabetes mellitus with hyperglycemia     Diabetes mellitus (HCC)     Back spasm     Secondary hypertension     Type 2 diabetes mellitus with chronic kidney disease     Body mass index (BMI) 45.0-49.9, adult (HCC)     Primary cutaneous lymphoma (HCC)     Stage 3b chronic kidney disease (HCC)     Chronic pain of right knee

## 2025-06-16 DIAGNOSIS — G89.29 CHRONIC PAIN OF RIGHT KNEE: ICD-10-CM

## 2025-06-16 DIAGNOSIS — M25.561 CHRONIC PAIN OF RIGHT KNEE: ICD-10-CM

## 2025-06-16 RX ORDER — PSEUDOEPHED/ACETAMINOPH/DIPHEN 30MG-500MG
TABLET ORAL
Qty: 180 TABLET | Refills: 1 | Status: SHIPPED | OUTPATIENT
Start: 2025-06-16

## 2025-06-16 NOTE — TELEPHONE ENCOUNTER
Last visit: 1.27.25  Last Med refill: 6.16.25  Does patient have enough medication for 72 hours: Yes    Next Visit Date:  No future appointments.    Health Maintenance   Topic Date Due    Diabetic retinal exam  Never done    COVID-19 Vaccine (5 - 2024-25 season) 09/01/2024    Annual Wellness Visit (Medicare Advantage)  01/01/2025    Diabetic foot exam  11/08/2025    A1C test (Diabetic or Prediabetic)  01/27/2026    Diabetic Alb to Cr ratio (uACR) test  01/27/2026    Lipids  01/27/2026    Depression Screen  01/27/2026    GFR test (Diabetes, CKD 3-4, OR last GFR 15-59)  01/27/2026    Colorectal Cancer Screen  04/19/2026    DTaP/Tdap/Td vaccine (4 - Td or Tdap) 07/10/2032    Hepatitis B vaccine  Completed    Flu vaccine  Completed    Shingles vaccine  Completed    Pneumococcal 50+ years Vaccine  Completed    Hepatitis C screen  Completed    HIV screen  Completed    Hepatitis A vaccine  Aged Out    Hib vaccine  Aged Out    Polio vaccine  Aged Out    Meningococcal (ACWY) vaccine  Aged Out    Meningococcal B vaccine  Aged Out    Pneumococcal 0-49 years Vaccine  Discontinued       Hemoglobin A1C (%)   Date Value   01/27/2025 7.0 (H)   10/11/2024 6.2   05/31/2024 6.0             ( goal A1C is < 7)   No components found for: \"LABMICR\"  No components found for: \"LDLCHOLESTEROL\", \"LDLCALC\"    (goal LDL is <100)   AST (U/L)   Date Value   04/20/2022 15     ALT (U/L)   Date Value   04/20/2022 13     BUN (mg/dL)   Date Value   01/27/2025 24 (H)     BP Readings from Last 3 Encounters:   04/23/25 (!) 166/92   04/11/25 (!) 176/96   01/27/25 128/81          (goal 120/80)    All Future Testing planned in [facility]  Lab Frequency Next Occurrence   XR KNEE RIGHT (3 VIEWS) Once 02/27/2025               Patient Active Problem List:     Essential hypertension     Chronic back pain     Morbid obesity with BMI of 50.0-59.9, adult (HCC)     ZURDO (obstructive sleep apnea)     Gout     CKD (chronic kidney disease)     Mixed hyperlipidemia

## 2025-06-30 DIAGNOSIS — I10 ESSENTIAL HYPERTENSION: ICD-10-CM

## 2025-06-30 RX ORDER — HYDRALAZINE HYDROCHLORIDE 100 MG/1
TABLET, FILM COATED ORAL
Qty: 90 TABLET | Refills: 11 | Status: SHIPPED | OUTPATIENT
Start: 2025-06-30

## 2025-06-30 NOTE — TELEPHONE ENCOUNTER
Last visit: 01/27/2025  Last Med refill:   Does patient have enough medication for 72 hours: No    Next Visit Date:  No future appointments.    Health Maintenance   Topic Date Due    Diabetic retinal exam  Never done    COVID-19 Vaccine (5 - 2024-25 season) 09/01/2024    Annual Wellness Visit (Medicare Advantage)  01/01/2025    Diabetic foot exam  11/08/2025    A1C test (Diabetic or Prediabetic)  01/27/2026    Diabetic Alb to Cr ratio (uACR) test  01/27/2026    Lipids  01/27/2026    Depression Screen  01/27/2026    GFR test (Diabetes, CKD 3-4, OR last GFR 15-59)  01/27/2026    Colorectal Cancer Screen  04/19/2026    DTaP/Tdap/Td vaccine (4 - Td or Tdap) 07/10/2032    Hepatitis B vaccine  Completed    Flu vaccine  Completed    Shingles vaccine  Completed    Pneumococcal 50+ years Vaccine  Completed    Hepatitis C screen  Completed    HIV screen  Completed    Hepatitis A vaccine  Aged Out    Hib vaccine  Aged Out    Polio vaccine  Aged Out    Meningococcal (ACWY) vaccine  Aged Out    Meningococcal B vaccine  Aged Out    Pneumococcal 0-49 years Vaccine  Discontinued       Hemoglobin A1C (%)   Date Value   01/27/2025 7.0 (H)   10/11/2024 6.2   05/31/2024 6.0             ( goal A1C is < 7)   No components found for: \"LABMICR\"  No components found for: \"LDLCHOLESTEROL\", \"LDLCALC\"    (goal LDL is <100)   AST (U/L)   Date Value   04/20/2022 15     ALT (U/L)   Date Value   04/20/2022 13     BUN (mg/dL)   Date Value   01/27/2025 24 (H)     BP Readings from Last 3 Encounters:   04/23/25 (!) 166/92   04/11/25 (!) 176/96   01/27/25 128/81          (goal 120/80)    All Future Testing planned in [facility]  Lab Frequency Next Occurrence   XR KNEE RIGHT (3 VIEWS) Once 02/27/2025               Patient Active Problem List:     Essential hypertension     Chronic back pain     Morbid obesity with BMI of 50.0-59.9, adult (HCC)     ZURDO (obstructive sleep apnea)     Gout     CKD (chronic kidney disease)     Mixed hyperlipidemia     Squamous

## 2025-07-25 DIAGNOSIS — M25.561 CHRONIC PAIN OF RIGHT KNEE: ICD-10-CM

## 2025-07-25 DIAGNOSIS — G89.29 CHRONIC PAIN OF RIGHT KNEE: ICD-10-CM

## 2025-07-25 RX ORDER — PSEUDOEPHED/ACETAMINOPH/DIPHEN 30MG-500MG
TABLET ORAL
Qty: 180 TABLET | Refills: 1 | Status: SHIPPED | OUTPATIENT
Start: 2025-07-25

## 2025-07-25 NOTE — TELEPHONE ENCOUNTER
Last visit: 1/27/25  Last Med refill: 6/16/25  Does patient have enough medication for 72 hours: no    Next Visit Date:  No future appointments.    Health Maintenance   Topic Date Due    Diabetic retinal exam  Never done    COVID-19 Vaccine (5 - 2024-25 season) 09/01/2024    Annual Wellness Visit (Medicare Advantage)  01/01/2025    Flu vaccine (1) 08/01/2025    Diabetic foot exam  11/08/2025    A1C test (Diabetic or Prediabetic)  01/27/2026    Diabetic Alb to Cr ratio (uACR) test  01/27/2026    Lipids  01/27/2026    Depression Screen  01/27/2026    GFR test (Diabetes, CKD 3-4, OR last GFR 15-59)  01/27/2026    Colorectal Cancer Screen  04/19/2026    DTaP/Tdap/Td vaccine (4 - Td or Tdap) 07/10/2032    Hepatitis B vaccine  Completed    Shingles vaccine  Completed    Pneumococcal 50+ years Vaccine  Completed    Hepatitis C screen  Completed    HIV screen  Completed    Hepatitis A vaccine  Aged Out    Hib vaccine  Aged Out    Polio vaccine  Aged Out    Meningococcal (ACWY) vaccine  Aged Out    Meningococcal B vaccine  Aged Out    Pneumococcal 0-49 years Vaccine  Discontinued       Hemoglobin A1C (%)   Date Value   01/27/2025 7.0 (H)   10/11/2024 6.2   05/31/2024 6.0             ( goal A1C is < 7)   No components found for: \"LABMICR\"  No components found for: \"LDLCHOLESTEROL\", \"LDLCALC\"    (goal LDL is <100)   AST (U/L)   Date Value   04/20/2022 15     ALT (U/L)   Date Value   04/20/2022 13     BUN (mg/dL)   Date Value   01/27/2025 24 (H)     BP Readings from Last 3 Encounters:   04/23/25 (!) 166/92   04/11/25 (!) 176/96   01/27/25 128/81          (goal 120/80)    All Future Testing planned in [facility]  Lab Frequency Next Occurrence   XR KNEE RIGHT (3 VIEWS) Once 02/27/2025               Patient Active Problem List:     Essential hypertension     Chronic back pain     Morbid obesity with BMI of 50.0-59.9, adult (HCC)     ZURDO (obstructive sleep apnea)     Gout     CKD (chronic kidney disease)     Mixed hyperlipidemia

## 2025-08-28 DIAGNOSIS — M25.561 CHRONIC PAIN OF RIGHT KNEE: ICD-10-CM

## 2025-08-28 DIAGNOSIS — I10 ESSENTIAL HYPERTENSION: ICD-10-CM

## 2025-08-28 DIAGNOSIS — G89.29 CHRONIC PAIN OF RIGHT KNEE: ICD-10-CM

## 2025-08-29 RX ORDER — UBIQUINOL 100 MG
CAPSULE ORAL
Qty: 100 EACH | Refills: 0 | Status: SHIPPED | OUTPATIENT
Start: 2025-08-29

## 2025-08-29 RX ORDER — PSEUDOEPHED/ACETAMINOPH/DIPHEN 30MG-500MG
TABLET ORAL
Qty: 180 TABLET | Refills: 1 | Status: SHIPPED | OUTPATIENT
Start: 2025-08-29

## 2025-08-29 RX ORDER — AMLODIPINE,VALSARTAN AND HYDROCHLOROTHIAZIDE 10; 12.5; 16 MG/1; MG/1; MG/1
TABLET, FILM COATED ORAL
Qty: 60 TABLET | Refills: 11 | Status: SHIPPED | OUTPATIENT
Start: 2025-08-29

## 2025-08-29 RX ORDER — ATORVASTATIN CALCIUM 40 MG/1
TABLET, FILM COATED ORAL
Qty: 30 TABLET | Refills: 11 | Status: SHIPPED | OUTPATIENT
Start: 2025-08-29